# Patient Record
Sex: FEMALE | Race: WHITE | NOT HISPANIC OR LATINO | Employment: OTHER | URBAN - METROPOLITAN AREA
[De-identification: names, ages, dates, MRNs, and addresses within clinical notes are randomized per-mention and may not be internally consistent; named-entity substitution may affect disease eponyms.]

---

## 2017-03-23 ENCOUNTER — HOSPITAL ENCOUNTER (OUTPATIENT)
Dept: RADIOLOGY | Facility: MEDICAL CENTER | Age: 67
End: 2017-03-23
Attending: INTERNAL MEDICINE
Payer: COMMERCIAL

## 2017-03-23 DIAGNOSIS — Z00.00 ROUTINE GENERAL MEDICAL EXAMINATION AT A HEALTH CARE FACILITY: ICD-10-CM

## 2017-03-23 PROCEDURE — 4410556 CT-CARDIAC SCORING

## 2019-03-06 ENCOUNTER — APPOINTMENT (RX ONLY)
Dept: URBAN - METROPOLITAN AREA CLINIC 4 | Facility: CLINIC | Age: 69
Setting detail: DERMATOLOGY
End: 2019-03-06

## 2019-03-06 DIAGNOSIS — L82.1 OTHER SEBORRHEIC KERATOSIS: ICD-10-CM

## 2019-03-06 DIAGNOSIS — Z71.89 OTHER SPECIFIED COUNSELING: ICD-10-CM

## 2019-03-06 DIAGNOSIS — L57.8 OTHER SKIN CHANGES DUE TO CHRONIC EXPOSURE TO NONIONIZING RADIATION: ICD-10-CM

## 2019-03-06 DIAGNOSIS — D18.0 HEMANGIOMA: ICD-10-CM

## 2019-03-06 DIAGNOSIS — D22 MELANOCYTIC NEVI: ICD-10-CM

## 2019-03-06 DIAGNOSIS — L81.4 OTHER MELANIN HYPERPIGMENTATION: ICD-10-CM

## 2019-03-06 PROBLEM — D22.5 MELANOCYTIC NEVI OF TRUNK: Status: ACTIVE | Noted: 2019-03-06

## 2019-03-06 PROBLEM — D18.01 HEMANGIOMA OF SKIN AND SUBCUTANEOUS TISSUE: Status: ACTIVE | Noted: 2019-03-06

## 2019-03-06 PROBLEM — I10 ESSENTIAL (PRIMARY) HYPERTENSION: Status: ACTIVE | Noted: 2019-03-06

## 2019-03-06 PROBLEM — L57.0 ACTINIC KERATOSIS: Status: ACTIVE | Noted: 2019-03-06

## 2019-03-06 PROCEDURE — 99203 OFFICE O/P NEW LOW 30 MIN: CPT

## 2019-03-06 PROCEDURE — ? ADDITIONAL NOTES

## 2019-03-06 PROCEDURE — ? COUNSELING

## 2019-03-06 ASSESSMENT — LOCATION DETAILED DESCRIPTION DERM
LOCATION DETAILED: UPPER STERNUM
LOCATION DETAILED: LEFT PROXIMAL DORSAL FOREARM
LOCATION DETAILED: RIGHT INFERIOR CENTRAL MALAR CHEEK
LOCATION DETAILED: LEFT DISTAL DORSAL FOREARM
LOCATION DETAILED: LEFT CENTRAL MALAR CHEEK
LOCATION DETAILED: RIGHT SUPERIOR MEDIAL MIDBACK
LOCATION DETAILED: RIGHT DISTAL DORSAL FOREARM
LOCATION DETAILED: RIGHT PROXIMAL DORSAL FOREARM
LOCATION DETAILED: LEFT INFERIOR CENTRAL MALAR CHEEK
LOCATION DETAILED: LEFT INFERIOR MEDIAL MIDBACK
LOCATION DETAILED: STERNAL NOTCH

## 2019-03-06 ASSESSMENT — LOCATION SIMPLE DESCRIPTION DERM
LOCATION SIMPLE: CHEST
LOCATION SIMPLE: LEFT CHEEK
LOCATION SIMPLE: RIGHT LOWER BACK
LOCATION SIMPLE: RIGHT FOREARM
LOCATION SIMPLE: RIGHT CHEEK
LOCATION SIMPLE: LEFT FOREARM
LOCATION SIMPLE: LEFT LOWER BACK

## 2019-03-06 ASSESSMENT — LOCATION ZONE DERM
LOCATION ZONE: FACE
LOCATION ZONE: TRUNK
LOCATION ZONE: ARM

## 2019-03-06 NOTE — PROCEDURE: ADDITIONAL NOTES
Detail Level: Simple
Additional Notes: Includes spots of concern on intake.\\nReassure and observe for any changes.

## 2020-07-13 ENCOUNTER — APPOINTMENT (RX ONLY)
Dept: URBAN - METROPOLITAN AREA CLINIC 4 | Facility: CLINIC | Age: 70
Setting detail: DERMATOLOGY
End: 2020-07-13

## 2020-07-13 DIAGNOSIS — L57.0 ACTINIC KERATOSIS: ICD-10-CM

## 2020-07-13 DIAGNOSIS — Z71.89 OTHER SPECIFIED COUNSELING: ICD-10-CM

## 2020-07-13 DIAGNOSIS — L81.4 OTHER MELANIN HYPERPIGMENTATION: ICD-10-CM

## 2020-07-13 DIAGNOSIS — D18.0 HEMANGIOMA: ICD-10-CM

## 2020-07-13 DIAGNOSIS — L82.1 OTHER SEBORRHEIC KERATOSIS: ICD-10-CM

## 2020-07-13 DIAGNOSIS — D22 MELANOCYTIC NEVI: ICD-10-CM

## 2020-07-13 DIAGNOSIS — L57.8 OTHER SKIN CHANGES DUE TO CHRONIC EXPOSURE TO NONIONIZING RADIATION: ICD-10-CM

## 2020-07-13 PROBLEM — D18.01 HEMANGIOMA OF SKIN AND SUBCUTANEOUS TISSUE: Status: ACTIVE | Noted: 2020-07-13

## 2020-07-13 PROBLEM — D22.5 MELANOCYTIC NEVI OF TRUNK: Status: ACTIVE | Noted: 2020-07-13

## 2020-07-13 PROCEDURE — 17000 DESTRUCT PREMALG LESION: CPT

## 2020-07-13 PROCEDURE — ? COUNSELING

## 2020-07-13 PROCEDURE — ? LIQUID NITROGEN

## 2020-07-13 PROCEDURE — 99214 OFFICE O/P EST MOD 30 MIN: CPT | Mod: 25

## 2020-07-13 ASSESSMENT — LOCATION SIMPLE DESCRIPTION DERM
LOCATION SIMPLE: LEFT FOREARM
LOCATION SIMPLE: RIGHT FOREARM
LOCATION SIMPLE: UPPER BACK
LOCATION SIMPLE: CHEST
LOCATION SIMPLE: NOSE
LOCATION SIMPLE: LEFT LOWER BACK
LOCATION SIMPLE: RIGHT LOWER BACK
LOCATION SIMPLE: RIGHT CHEEK
LOCATION SIMPLE: LEFT CHEEK

## 2020-07-13 ASSESSMENT — LOCATION DETAILED DESCRIPTION DERM
LOCATION DETAILED: STERNAL NOTCH
LOCATION DETAILED: UPPER STERNUM
LOCATION DETAILED: LEFT INFERIOR CENTRAL MALAR CHEEK
LOCATION DETAILED: RIGHT DISTAL DORSAL FOREARM
LOCATION DETAILED: LEFT DISTAL DORSAL FOREARM
LOCATION DETAILED: LEFT PROXIMAL DORSAL FOREARM
LOCATION DETAILED: RIGHT INFERIOR CENTRAL MALAR CHEEK
LOCATION DETAILED: RIGHT SUPERIOR MEDIAL MIDBACK
LOCATION DETAILED: LEFT INFERIOR MEDIAL MIDBACK
LOCATION DETAILED: RIGHT PROXIMAL DORSAL FOREARM
LOCATION DETAILED: INFERIOR THORACIC SPINE
LOCATION DETAILED: NASAL ROOT

## 2020-07-13 ASSESSMENT — LOCATION ZONE DERM
LOCATION ZONE: NOSE
LOCATION ZONE: TRUNK
LOCATION ZONE: ARM
LOCATION ZONE: FACE

## 2020-07-13 NOTE — PROCEDURE: LIQUID NITROGEN
Duration Of Freeze Thaw-Cycle (Seconds): 0
Render Note In Bullet Format When Appropriate: No
Consent: The patient's consent was obtained including but not limited to risks of crusting, scabbing, blistering, scarring, darker or lighter pigmentary change, recurrence, incomplete removal and infection.
Post-Care Instructions: I reviewed with the patient in detail post-care instructions. Patient is to wear sunprotection, and avoid picking at any of the treated lesions. Pt may apply Vaseline  or Aquaphor to crusted or scabbing areas.
Detail Level: Detailed

## 2021-07-15 ENCOUNTER — APPOINTMENT (RX ONLY)
Dept: URBAN - METROPOLITAN AREA CLINIC 31 | Facility: CLINIC | Age: 71
Setting detail: DERMATOLOGY
End: 2021-07-15

## 2021-07-15 DIAGNOSIS — L57.0 ACTINIC KERATOSIS: ICD-10-CM

## 2021-07-15 DIAGNOSIS — D485 NEOPLASM OF UNCERTAIN BEHAVIOR OF SKIN: ICD-10-CM

## 2021-07-15 DIAGNOSIS — Z71.89 OTHER SPECIFIED COUNSELING: ICD-10-CM

## 2021-07-15 DIAGNOSIS — L81.4 OTHER MELANIN HYPERPIGMENTATION: ICD-10-CM

## 2021-07-15 DIAGNOSIS — D69.2 OTHER NONTHROMBOCYTOPENIC PURPURA: ICD-10-CM

## 2021-07-15 DIAGNOSIS — D22 MELANOCYTIC NEVI: ICD-10-CM

## 2021-07-15 DIAGNOSIS — L82.1 OTHER SEBORRHEIC KERATOSIS: ICD-10-CM

## 2021-07-15 DIAGNOSIS — D18.0 HEMANGIOMA: ICD-10-CM

## 2021-07-15 PROBLEM — D22.62 MELANOCYTIC NEVI OF LEFT UPPER LIMB, INCLUDING SHOULDER: Status: ACTIVE | Noted: 2021-07-15

## 2021-07-15 PROBLEM — D22.5 MELANOCYTIC NEVI OF TRUNK: Status: ACTIVE | Noted: 2021-07-15

## 2021-07-15 PROBLEM — D22.72 MELANOCYTIC NEVI OF LEFT LOWER LIMB, INCLUDING HIP: Status: ACTIVE | Noted: 2021-07-15

## 2021-07-15 PROBLEM — D18.01 HEMANGIOMA OF SKIN AND SUBCUTANEOUS TISSUE: Status: ACTIVE | Noted: 2021-07-15

## 2021-07-15 PROBLEM — D48.5 NEOPLASM OF UNCERTAIN BEHAVIOR OF SKIN: Status: ACTIVE | Noted: 2021-07-15

## 2021-07-15 PROBLEM — D22.71 MELANOCYTIC NEVI OF RIGHT LOWER LIMB, INCLUDING HIP: Status: ACTIVE | Noted: 2021-07-15

## 2021-07-15 PROBLEM — D22.61 MELANOCYTIC NEVI OF RIGHT UPPER LIMB, INCLUDING SHOULDER: Status: ACTIVE | Noted: 2021-07-15

## 2021-07-15 PROCEDURE — 11102 TANGNTL BX SKIN SINGLE LES: CPT

## 2021-07-15 PROCEDURE — ? COUNSELING

## 2021-07-15 PROCEDURE — 17000 DESTRUCT PREMALG LESION: CPT | Mod: 59

## 2021-07-15 PROCEDURE — ? LIQUID NITROGEN

## 2021-07-15 PROCEDURE — 99213 OFFICE O/P EST LOW 20 MIN: CPT | Mod: 25

## 2021-07-15 PROCEDURE — 17003 DESTRUCT PREMALG LES 2-14: CPT

## 2021-07-15 PROCEDURE — ? BIOPSY BY SHAVE METHOD

## 2021-07-15 ASSESSMENT — LOCATION DETAILED DESCRIPTION DERM
LOCATION DETAILED: LEFT CENTRAL MALAR CHEEK
LOCATION DETAILED: RIGHT PROXIMAL DORSAL FOREARM
LOCATION DETAILED: LEFT MEDIAL MALAR CHEEK
LOCATION DETAILED: RIGHT POSTERIOR SHOULDER
LOCATION DETAILED: LEFT RADIAL DORSAL HAND
LOCATION DETAILED: RIGHT CENTRAL MALAR CHEEK
LOCATION DETAILED: RIGHT INFERIOR MEDIAL UPPER BACK
LOCATION DETAILED: RIGHT POPLITEAL SKIN
LOCATION DETAILED: LEFT POSTERIOR SHOULDER
LOCATION DETAILED: EPIGASTRIC SKIN
LOCATION DETAILED: RIGHT MEDIAL UPPER BACK
LOCATION DETAILED: NASAL DORSUM
LOCATION DETAILED: LEFT ANTERIOR DISTAL THIGH
LOCATION DETAILED: RIGHT ANTERIOR DISTAL THIGH
LOCATION DETAILED: LEFT ELBOW
LOCATION DETAILED: RIGHT VENTRAL DISTAL FOREARM
LOCATION DETAILED: RIGHT KNEE
LOCATION DETAILED: LEFT DISTAL DORSAL FOREARM
LOCATION DETAILED: LEFT POPLITEAL SKIN
LOCATION DETAILED: PERIUMBILICAL SKIN
LOCATION DETAILED: RIGHT DISTAL DORSAL FOREARM
LOCATION DETAILED: MIDDLE STERNUM
LOCATION DETAILED: LEFT VENTRAL DISTAL FOREARM
LOCATION DETAILED: LEFT ANTECUBITAL SKIN
LOCATION DETAILED: LEFT PROXIMAL DORSAL FOREARM
LOCATION DETAILED: RIGHT ELBOW
LOCATION DETAILED: RIGHT ANTECUBITAL SKIN
LOCATION DETAILED: RIGHT DISTAL POSTERIOR THIGH
LOCATION DETAILED: RIGHT RADIAL DORSAL HAND
LOCATION DETAILED: LEFT DISTAL POSTERIOR THIGH
LOCATION DETAILED: LEFT KNEE
LOCATION DETAILED: LEFT INFERIOR MEDIAL UPPER BACK
LOCATION DETAILED: NASAL ROOT

## 2021-07-15 ASSESSMENT — LOCATION ZONE DERM
LOCATION ZONE: ARM
LOCATION ZONE: FACE
LOCATION ZONE: TRUNK
LOCATION ZONE: NOSE
LOCATION ZONE: HAND
LOCATION ZONE: LEG

## 2021-07-15 ASSESSMENT — LOCATION SIMPLE DESCRIPTION DERM
LOCATION SIMPLE: LEFT SHOULDER
LOCATION SIMPLE: RIGHT THIGH
LOCATION SIMPLE: RIGHT ELBOW
LOCATION SIMPLE: LEFT UPPER ARM
LOCATION SIMPLE: RIGHT UPPER ARM
LOCATION SIMPLE: RIGHT UPPER BACK
LOCATION SIMPLE: RIGHT CHEEK
LOCATION SIMPLE: LEFT THIGH
LOCATION SIMPLE: CHEST
LOCATION SIMPLE: LEFT POPLITEAL SKIN
LOCATION SIMPLE: RIGHT POSTERIOR THIGH
LOCATION SIMPLE: LEFT FOREARM
LOCATION SIMPLE: RIGHT HAND
LOCATION SIMPLE: NOSE
LOCATION SIMPLE: LEFT ELBOW
LOCATION SIMPLE: ABDOMEN
LOCATION SIMPLE: LEFT HAND
LOCATION SIMPLE: LEFT CHEEK
LOCATION SIMPLE: LEFT KNEE
LOCATION SIMPLE: LEFT UPPER BACK
LOCATION SIMPLE: LEFT POSTERIOR THIGH
LOCATION SIMPLE: RIGHT POPLITEAL SKIN
LOCATION SIMPLE: RIGHT FOREARM
LOCATION SIMPLE: RIGHT SHOULDER
LOCATION SIMPLE: RIGHT KNEE

## 2021-07-15 NOTE — PROCEDURE: REASSURANCE
Detail Level: Zone
Include Location In Plan?: No
Additional Note: Includes lesion of concern noted on intake.
Additional Notes (Optional): Recommend emollient moisturizer bid
Detail Level: Simple

## 2021-07-15 NOTE — PROCEDURE: LIQUID NITROGEN
Detail Level: Detailed
Render Note In Bullet Format When Appropriate: No
Post-Care Instructions: I reviewed with the patient in detail post-care instructions. Patient is to wear sunprotection, and avoid picking at any of the treated lesions. Pt may apply Vaseline to crusted or scabbing areas.
Duration Of Freeze Thaw-Cycle (Seconds): 0
Show Aperture Variable?: Yes
Consent: The patient's consent was obtained including but not limited to risks of crusting, scabbing, blistering, scarring, darker or lighter pigmentary change, recurrence, incomplete removal and infection.

## 2021-08-09 ENCOUNTER — APPOINTMENT (RX ONLY)
Dept: URBAN - METROPOLITAN AREA CLINIC 31 | Facility: CLINIC | Age: 71
Setting detail: DERMATOLOGY
End: 2021-08-09

## 2021-08-09 VITALS
OXYGEN SATURATION: 99 % | SYSTOLIC BLOOD PRESSURE: 132 MMHG | DIASTOLIC BLOOD PRESSURE: 78 MMHG | HEART RATE: 71 BPM | TEMPERATURE: 97.7 F

## 2021-08-09 PROBLEM — C44.311 BASAL CELL CARCINOMA OF SKIN OF NOSE: Status: ACTIVE | Noted: 2021-08-09

## 2021-08-09 PROCEDURE — ? MOHS SURGERY

## 2021-08-09 PROCEDURE — 17311 MOHS 1 STAGE H/N/HF/G: CPT

## 2021-08-09 PROCEDURE — 14060 TIS TRNFR E/N/E/L 10 SQ CM/<: CPT

## 2021-08-09 NOTE — PROCEDURE: MIPS QUALITY
Quality 431: Preventive Care And Screening: Unhealthy Alcohol Use - Screening: Patient screened for unhealthy alcohol use using a single question and scores less than 2 times per year
Quality 226: Preventive Care And Screening: Tobacco Use: Screening And Cessation Intervention: Patient screened for tobacco use and is an ex/non-smoker
Quality 130: Documentation Of Current Medications In The Medical Record: Current Medications Documented
Quality 265: Biopsy Follow-Up: Biopsy results reviewed, communicated, tracked, and documented
Detail Level: Detailed
Quality 111:Pneumonia Vaccination Status For Older Adults: Pneumococcal Vaccination Previously Received

## 2021-08-09 NOTE — PROCEDURE: MOHS SURGERY
Did You Provide Opioid Counseling: No
Purse String (Intermediate) Text: Given the location of the defect and the characteristics of the surrounding skin a purse string intermediate closure was deemed most appropriate.  Undermining was performed circumfirentially around the surgical defect.  A purse string suture was then placed and tightened.
Consent (Marginal Mandibular)/Introductory Paragraph: The rationale for Mohs was explained to the patient and consent was obtained. The risks, benefits and alternatives to therapy were discussed in detail. Specifically, the risks of damage to the marginal mandibular branch of the facial nerve, infection, scarring, bleeding, prolonged wound healing, incomplete removal, allergy to anesthesia, and recurrence were addressed. Prior to the procedure, the treatment site was clearly identified and confirmed by the patient. All components of Universal Protocol/PAUSE Rule completed.
Otolaryngologist Procedure Text (C): After obtaining clear surgical margins the patient was sent to otolaryngology for surgical repair.  The patient understands they will receive post-surgical care and follow-up from the referring physician's office.
Incorporate Mauc Into Note After Indications: Yes
Incidental Squamous Cell Carcinoma In Situ Histology Text: In addition to the primary tumor, there were glassy pink keratinocytes in the epidermis with full thickness atypia and atypical cell morphology.
Hemigard Intro: Due to skin fragility and wound tension, it was decided to use HEMIGARD adhesive retention suture devices to permit a linear closure. The skin was cleaned and dried for a 6cm distance away from the wound. Excessive hair, if present, was removed to allow for adhesion.
Oculoplastic Surgeon Procedure Text (B): After obtaining clear surgical margins the patient was sent to oculoplastics for surgical repair.  The patient understands they will receive post-surgical care and follow-up from the referring physician's office.
Double M-Plasty Complex Repair Preamble Text (Leave Blank If You Do Not Want): Extensive wide undermining was performed.
Previous Accession (Optional): [id number]
Bcc  Nodular Histology Text: There were numerous nodular aggregates of basaloid cells in the dermis with atypical cell morphology.
Detail Level: Detailed
Quadrants Reporting?: 0
Island Pedicle Flap Text: The defect edges were debeveled with a #15 scalpel blade.  Given the location of the defect, shape of the defect and the proximity to free margins an island pedicle advancement flap was deemed most appropriate.  Using a sterile surgical marker, an appropriate advancement flap was drawn incorporating the defect, outlining the appropriate donor tissue and placing the expected incisions within the relaxed skin tension lines where possible.    The area thus outlined was incised deep to adipose tissue with a #15 scalpel blade.  The skin margins were undermined to an appropriate distance in all directions around the primary defect and laterally outward around the island pedicle utilizing iris scissors.  There was minimal undermining beneath the pedicle flap.
Tumor Debulked?: curette
Epidermal Closure: running cuticular
Stage 15: Additional Anesthesia Type: 1% lidocaine with epinephrine
Estimated Blood Loss (Cc): minimal
Consent (Spinal Accessory)/Introductory Paragraph: The rationale for Mohs was explained to the patient and consent was obtained. The risks, benefits and alternatives to therapy were discussed in detail. Specifically, the risks of damage to the spinal accessory nerve, infection, scarring, bleeding, prolonged wound healing, incomplete removal, allergy to anesthesia, and recurrence were addressed. Prior to the procedure, the treatment site was clearly identified and confirmed by the patient. All components of Universal Protocol/PAUSE Rule completed.
Special Stains Stage 14 - Results: Base On Clearance Noted Above
Double O-Z Plasty Text: The defect edges were debeveled with a #15 scalpel blade.  Given the location of the defect, shape of the defect and the proximity to free margins a Double O-Z plasty (double transposition flap) was deemed most appropriate.  Using a sterile surgical marker, the appropriate transposition flaps were drawn incorporating the defect and placing the expected incisions within the relaxed skin tension lines where possible. The area thus outlined was incised deep to adipose tissue with a #15 scalpel blade.  The skin margins were undermined to an appropriate distance in all directions utilizing iris scissors.  Hemostasis was achieved with electrocautery.  The flaps were then transposed into place, one clockwise and the other counterclockwise, and anchored with interrupted buried subcutaneous sutures.
X Size Of Lesion In Cm (Optional): 0.5
Inflammation Suggestive Of Cancer Camouflage Histology Text: There was a dense lymphocytic infiltrate which prevented adequate histologic evaluation of adjacent structures.
Nostril Rim Text: The closure involved the nostril rim.
Consent 2/Introductory Paragraph: Mohs surgery was explained to the patient and consent was obtained. The risks, benefits and alternatives to therapy were discussed in detail. Specifically, the risks of infection, scarring, bleeding, prolonged wound healing, incomplete removal, allergy to anesthesia, nerve injury and recurrence were addressed. Prior to the procedure, the treatment site was clearly identified and confirmed by the patient. All components of Universal Protocol/PAUSE Rule completed.
Helical Rim Text: The closure involved the helical rim.
Initial Size Of Lesion: 0.4
Brow Lift Text: A midfrontal incision was made medially to the defect to allow access to the tissues just superior to the left eyebrow. Following careful dissection inferiorly in a supraperiosteal plane to the level of the left eyebrow, several 3-0 monocryl sutures were used to resuspend the eyebrow orbicularis oculi muscular unit to the superior frontal bone periosteum. This resulted in an appropriate reapproximation of static eyebrow symmetry and correction of the left brow ptosis.
Provider Procedure Text (B): After obtaining clear surgical margins the defect was repaired by another provider.
Ear Star Wedge Flap Text: The defect edges were debeveled with a #15 blade scalpel.  Given the location of the defect and the proximity to free margins (helical rim) an ear star wedge flap was deemed most appropriate.  Using a sterile surgical marker, the appropriate flap was drawn incorporating the defect and placing the expected incisions between the helical rim and antihelix where possible.  The area thus outlined was incised through and through with a #15 scalpel blade.
O-L Flap Text: The defect edges were debeveled with a #15 scalpel blade.  Given the location of the defect, shape of the defect and the proximity to free margins an O-L flap was deemed most appropriate.  Using a sterile surgical marker, an appropriate advancement flap was drawn incorporating the defect and placing the expected incisions within the relaxed skin tension lines where possible.    The area thus outlined was incised deep to adipose tissue with a #15 scalpel blade.  The skin margins were undermined to an appropriate distance in all directions utilizing iris scissors.
Asc Procedure Text (F): After obtaining clear surgical margins the patient was sent to an ASC for surgical repair.  The patient understands they will receive post-surgical care and follow-up from the ASC physician.
Missing Epidermis Histology Text: There was focal missing epidermis on the sections examined.
Partial Purse String (Simple) Text: Given the location of the defect and the characteristics of the surrounding skin a simple purse string closure was deemed most appropriate.  Undermining was performed circumfirentially around the surgical defect.  A purse string suture was then placed and tightened. Wound tension only allowed a partial closure of the circular defect.
Wound Care: Vaseline
Plastic Surgeon Procedure Text (C): After obtaining clear surgical margins the patient was sent to plastics for surgical repair.  The patient understands they will receive post-surgical care and follow-up from the referring physician's office.
Area L Indication Text: Tumors in this location are included in Area L (trunk and extremities).  Mohs surgery is indicated for larger tumors, or tumors with aggressive histologic features, in these anatomic locations.
Area H Indication Text: Tumors in this location are included in Area H (eyelids, eyebrows, nose, lips, chin, ear, pre-auricular, post-auricular, temple, genitalia, hands, feet, ankles and areola).  Tissue conservation is critical in these anatomic locations.
Consent 1/Introductory Paragraph: The rationale for Mohs was explained to the patient and consent was obtained. The risks, benefits and alternatives to therapy were discussed in detail. Specifically, the risks of infection, scarring, bleeding, prolonged wound healing, incomplete removal, allergy to anesthesia, nerve injury and recurrence were addressed. Prior to the procedure, the treatment site was clearly identified and confirmed by the patient. All components of Universal Protocol/PAUSE Rule completed.
Helical Rim Advancement Flap Text: The defect edges were debeveled with a #15 blade scalpel.  Given the location of the defect and the proximity to free margins (helical rim) a double helical rim advancement flap was deemed most appropriate.  Using a sterile surgical marker, the appropriate advancement flaps were drawn incorporating the defect and placing the expected incisions between the helical rim and antihelix where possible.  The area thus outlined was incised through and through with a #15 scalpel blade.  With a skin hook and iris scissors, the flaps were gently and sharply undermined and freed up.
Pain Refusal Text: I offered to prescribe pain medication but the patient refused to take this medication.
Graft Donor Site Bandage (Optional-Leave Blank If You Don't Want In Note): A bolster was fitted to the graft site and sewn into place. A pressure bandage were applied to the donor site and over the bolster.
Graft Cartilage Fenestration Text: The cartilage was fenestrated with a 2mm punch biopsy to help facilitate graft survival and healing.
Burow's Advancement Flap Text: The defect edges were debeveled with a #15 scalpel blade.  Given the location of the defect and the proximity to free margins a Burow's advancement flap was deemed most appropriate.  Using a sterile surgical marker, the appropriate advancement flap was drawn incorporating the defect and placing the expected incisions within the relaxed skin tension lines where possible.    The area thus outlined was incised deep to adipose tissue with a #15 scalpel blade.  The skin margins were undermined to an appropriate distance in all directions utilizing iris scissors.
Posterior Auricular Interpolation Flap Text: A decision was made to reconstruct the defect utilizing an interpolation axial flap and a staged reconstruction.  A telfa template was made of the defect.  This telfa template was then used to outline the posterior auricular interpolation flap.  The donor area for the pedicle flap was then injected with anesthesia.  The flap was excised through the skin and subcutaneous tissue down to the layer of the underlying musculature.  The pedicle flap was carefully excised within this deep plane to maintain its blood supply.  The edges of the donor site were undermined.   The donor site was closed in a primary fashion.  The pedicle was then rotated into position and sutured.  Once the tube was sutured into place, adequate blood supply was confirmed with blanching and refill.  The pedicle was then wrapped with xeroform gauze and dressed appropriately with a telfa and gauze bandage to ensure continued blood supply and protect the attached pedicle.
Mohs Histo Method Verbiage: Each section was then chromacoded and processed in the Mohs lab using the Mohs protocol and submitted for frozen section.
Ear Wedge Repair Text: A wedge excision was completed by carrying down an excision through the full thickness of the ear and cartilage with an inward facing Burow's triangle. The wound was then closed in a layered fashion.
Crescentic Advancement Flap Text: The defect edges were debeveled with a #15 scalpel blade.  Given the location of the defect and the proximity to free margins a crescentic advancement flap was deemed most appropriate.  Using a sterile surgical marker, the appropriate advancement flap was drawn incorporating the defect and placing the expected incisions within the relaxed skin tension lines where possible.    The area thus outlined was incised deep to adipose tissue with a #15 scalpel blade.  The skin margins were undermined to an appropriate distance in all directions utilizing iris scissors.
Intermediate Repair Preamble Text (Leave Blank If You Do Not Want): Undermining was performed with blunt dissection.
Undermining Type: Entire Wound
Mid-Level Procedure Text (D): After obtaining clear surgical margins the patient was sent to a mid-level provider for surgical repair.  The patient understands they will receive post-surgical care and follow-up from the mid-level provider.
Debridement Text: The wound edges were debrided prior to proceeding with the closure to facilitate wound healing.
Mohs Case Number: [id number]
Melolabial Transposition Flap Text: The defect edges were debeveled with a #15 scalpel blade.  Given the location of the defect and the proximity to free margins a melolabial flap was deemed most appropriate.  Using a sterile surgical marker, an appropriate melolabial transposition flap was drawn incorporating the defect.    The area thus outlined was incised deep to adipose tissue with a #15 scalpel blade.  The skin margins were undermined to an appropriate distance in all directions utilizing iris scissors.
Referring Physician (Optional): MARY Jenkins
V-Y Flap Text: The defect edges were debeveled with a #15 scalpel blade.  Given the location of the defect, shape of the defect and the proximity to free margins a V-Y flap was deemed most appropriate.  Using a sterile surgical marker, an appropriate advancement flap was drawn incorporating the defect and placing the expected incisions within the relaxed skin tension lines where possible.    The area thus outlined was incised deep to adipose tissue with a #15 scalpel blade.  The skin margins were undermined to an appropriate distance in all directions utilizing iris scissors.
Advancement Flap (Double) Text: The defect edges were debeveled with a #15 scalpel blade.  Given the location of the defect and the proximity to free margins a double advancement flap was deemed most appropriate.  Using a sterile surgical marker, the appropriate advancement flaps were drawn incorporating the defect and placing the expected incisions within the relaxed skin tension lines where possible.    The area thus outlined was incised deep to adipose tissue with a #15 scalpel blade.  The skin margins were undermined to an appropriate distance in all directions utilizing iris scissors.
Consent 3/Introductory Paragraph: I gave the patient a chance to ask questions they had about the procedure.  Following this I explained the Mohs procedure and consent was obtained. The risks, benefits and alternatives to therapy were discussed in detail. Specifically, the risks of infection, scarring, bleeding, prolonged wound healing, incomplete removal, allergy to anesthesia, nerve injury and recurrence were addressed. Prior to the procedure, the treatment site was clearly identified and confirmed by the patient. All components of Universal Protocol/PAUSE Rule completed.
Epidermal Closure Graft Donor Site (Optional): running
O-T Plasty Text: The defect edges were debeveled with a #15 scalpel blade.  Given the location of the defect, shape of the defect and the proximity to free margins an O-T plasty was deemed most appropriate.  Using a sterile surgical marker, an appropriate O-T plasty was drawn incorporating the defect and placing the expected incisions within the relaxed skin tension lines where possible.    The area thus outlined was incised deep to adipose tissue with a #15 scalpel blade.  The skin margins were undermined to an appropriate distance in all directions utilizing iris scissors.
Retention Suture Text: Retention sutures were placed to support the closure and prevent dehiscence.
Cartilage Graft Text: The defect edges were debeveled with a #15 scalpel blade.  Given the location of the defect, shape of the defect, the fact the defect involved a full thickness cartilage defect a cartilage graft was deemed most appropriate.  An appropriate donor site was identified, cleansed, and anesthetized. The cartilage graft was then harvested and transferred to the recipient site, oriented appropriately and then sutured into place.  The secondary defect was then repaired using a primary closure.
W Plasty Text: The lesion was extirpated to the level of the fat with a #15 scalpel blade.  Given the location of the defect, shape of the defect and the proximity to free margins a W-plasty was deemed most appropriate for repair.  Using a sterile surgical marker, the appropriate transposition arms of the W-plasty were drawn incorporating the defect and placing the expected incisions within the relaxed skin tension lines where possible.    The area thus outlined was incised deep to adipose tissue with a #15 scalpel blade.  The skin margins were undermined to an appropriate distance in all directions utilizing iris scissors.  The opposing transposition arms were then transposed into place in opposite direction and anchored with interrupted buried subcutaneous sutures.
Complex Repair And Graft Additional Text (Will Appearing After The Standard Complex Repair Text): The complex repair was not sufficient to completely close the primary defect. The remaining additional defect was repaired with the graft mentioned below.
Scc Moderately Differentiated Histology Text: There are nests and single cells of moderately-differentiated atypical squamous epithelial cells extending into the dermis with atypical cell morphology.
Anesthesia Volume In Cc: 9
Tissue Cultured Epidermal Autograft Text: The defect edges were debeveled with a #15 scalpel blade.  Given the location of the defect, shape of the defect and the proximity to free margins a tissue cultured epidermal autograft was deemed most appropriate.  The graft was then trimmed to fit the size of the defect.  The graft was then placed in the primary defect and oriented appropriately.
Cheiloplasty (Less Than 50%) Text: A decision was made to reconstruct the defect with a  cheiloplasty.  The defect was undermined extensively.  Additional obicularis oris muscle was excised with a 15 blade scalpel.  The defect was converted into a full thickness wedge, of less than 50% of the vertical height of the lip, to facilite a better cosmetic result.  Small vessels were then tied off with 5-0 monocyrl. The obicularis oris, superficial fascia, adipose and dermis were then reapproximated.  After the deeper layers were approximated the epidermis was reapproximated with particular care given to realign the vermilion border.
Peng Advancement Flap Text: The defect edges were debeveled with a #15 scalpel blade.  Given the location of the defect, shape of the defect and the proximity to free margins a Peng advancement flap was deemed most appropriate.  Using a sterile surgical marker, an appropriate advancement flap was drawn incorporating the defect and placing the expected incisions within the relaxed skin tension lines where possible. The area thus outlined was incised deep to adipose tissue with a #15 scalpel blade.  The skin margins were undermined to an appropriate distance in all directions utilizing iris scissors.
Mart-1 - Positive Histology Text: MART-1 staining demonstrates areas of higher density and clustering of melanocytes with Pagetoid spread upwards within the epidermis. The surgical margins are positive for tumor cells.
H Plasty Text: Given the location of the defect, shape of the defect and the proximity to free margins a H-plasty was deemed most appropriate for repair.  Using a sterile surgical marker, the appropriate advancement arms of the H-plasty were drawn incorporating the defect and placing the expected incisions within the relaxed skin tension lines where possible. The area thus outlined was incised deep to adipose tissue with a #15 scalpel blade. The skin margins were undermined to an appropriate distance in all directions utilizing iris scissors.  The opposing advancement arms were then advanced into place in opposite direction and anchored with interrupted buried subcutaneous sutures.
Suturegard Body: The suture ends were repeatedly re-tightened and re-clamped to achieve the desired tissue expansion.
Rhomboid Transposition Flap Text: The defect edges were debeveled with a #15 scalpel blade.  Given the location of the defect and the proximity to free margins a rhomboid transposition flap was deemed most appropriate.  Using a sterile surgical marker, an appropriate rhomboid flap was drawn incorporating the defect.    The area thus outlined was incised deep to adipose tissue with a #15 scalpel blade.  The skin margins were undermined to an appropriate distance in all directions utilizing iris scissors.
Mixed Superficial And Nodular Bcc Histology Text: There were numerous nodular aggregates of basaloid cells in the dermis as well as budding off the epidermis with atypical cell morphology.
Donor Site Anesthesia Type: same as repair anesthesia
Melolabial Interpolation Flap Text: A decision was made to reconstruct the defect utilizing an interpolation axial flap and a staged reconstruction.  A telfa template was made of the defect.  This telfa template was then used to outline the melolabial interpolation flap.  The donor area for the pedicle flap was then injected with anesthesia.  The flap was excised through the skin and subcutaneous tissue down to the layer of the underlying musculature.  The pedicle flap was carefully excised within this deep plane to maintain its blood supply.  The edges of the donor site were undermined.   The donor site was closed in a primary fashion.  The pedicle was then rotated into position and sutured.  Once the tube was sutured into place, adequate blood supply was confirmed with blanching and refill.  The pedicle was then wrapped with xeroform gauze and dressed appropriately with a telfa and gauze bandage to ensure continued blood supply and protect the attached pedicle.
Scc Poorly Differentiated Histology Text: There are nests and single cells of poorly-differentiated atypical squamous epithelial cells extending into the dermis with atypical cell morphology.
Epidermal Sutures: 6-0 Surgipro
O-Z Flap Text: The defect edges were debeveled with a #15 scalpel blade.  Given the location of the defect, shape of the defect and the proximity to free margins an O-Z flap was deemed most appropriate.  Using a sterile surgical marker, an appropriate transposition flap was drawn incorporating the defect and placing the expected incisions within the relaxed skin tension lines where possible. The area thus outlined was incised deep to adipose tissue with a #15 scalpel blade.  The skin margins were undermined to an appropriate distance in all directions utilizing iris scissors.
Consent (Ear)/Introductory Paragraph: The rationale for Mohs was explained to the patient and consent was obtained. The risks, benefits and alternatives to therapy were discussed in detail. Specifically, the risks of ear deformity, infection, scarring, bleeding, prolonged wound healing, incomplete removal, allergy to anesthesia, nerve injury and recurrence were addressed. Prior to the procedure, the treatment site was clearly identified and confirmed by the patient. All components of Universal Protocol/PAUSE Rule completed.
Modified Advancement Flap Text: The defect edges were debeveled with a #15 scalpel blade.  Given the location of the defect, shape of the defect and the proximity to free margins a modified advancement flap was deemed most appropriate.  Using a sterile surgical marker, an appropriate advancement flap was drawn incorporating the defect and placing the expected incisions within the relaxed skin tension lines where possible.    The area thus outlined was incised deep to adipose tissue with a #15 scalpel blade.  The skin margins were undermined to an appropriate distance in all directions utilizing iris scissors.
Split-Thickness Skin Graft Text: The defect edges were debeveled with a #15 scalpel blade.  Given the location of the defect, shape of the defect and the proximity to free margins a split thickness skin graft was deemed most appropriate.  Using a sterile surgical marker, the primary defect shape was transferred to the donor site. The split thickness graft was then harvested.  The skin graft was then placed in the primary defect and oriented appropriately.
Orbicularis Oris Muscle Flap Text: The defect edges were debeveled with a #15 scalpel blade.  Given that the defect affected the competency of the oral sphincter an obicularis oris muscle flap was deemed most appropriate to restore this competency and normal muscle function.  Using a sterile surgical marker, an appropriate flap was drawn incorporating the defect. The area thus outlined was incised with a #15 scalpel blade.
Ftsg Text: The defect edges were debeveled with a #15 scalpel blade.  Given the location of the defect, shape of the defect and the proximity to free margins a full thickness skin graft was deemed most appropriate.  Using a sterile surgical marker, the primary defect shape was transferred to the donor site. The area thus outlined was incised deep to adipose tissue with a #15 scalpel blade.  The harvested graft was then trimmed of adipose tissue until only dermis and epidermis was left.  The skin margins of the secondary defect were undermined to an appropriate distance in all directions utilizing iris scissors.  The secondary defect was closed with interrupted buried subcutaneous sutures.  The skin edges were then re-apposed with running  sutures.  The skin graft was then placed in the primary defect and oriented appropriately.
Trilobed Flap Text: The defect edges were debeveled with a #15 scalpel blade.  Given the location of the defect and the proximity to free margins a trilobed flap was deemed most appropriate.  Using a sterile surgical marker, an appropriate trilobed flap drawn around the defect.    The area thus outlined was incised deep to adipose tissue with a #15 scalpel blade.  The skin margins were undermined to an appropriate distance in all directions utilizing iris scissors.
Non-Graft Cartilage Fenestration Text: The cartilage was fenestrated with a 2mm punch biopsy to help facilitate healing.
Was The Patient On Physician Recommended Anticoagulation Therapy?: Please Select the Appropriate Response
Unna Boot Text: An Unna boot was placed to help immobilize the limb and facilitate more rapid healing.
Additional Anesthesia Volume In Cc: 6
Manual Repair Warning Statement: We plan on removing the manually selected variable below in favor of our much easier automatic structured text blocks found in the previous tab. We decided to do this to help make the flow better and give you the full power of structured data. Manual selection is never going to be ideal in our platform and I would encourage you to avoid using manual selection from this point on, especially since I will be sunsetting this feature. It is important that you do one of two things with the customized text below. First, you can save all of the text in a word file so you can have it for future reference. Second, transfer the text to the appropriate area in the Library tab. Lastly, if there is a flap or graft type which we do not have you need to let us know right away so I can add it in before the variable is hidden. No need to panic, we plan to give you roughly 6 months to make the change.
Bilateral Helical Rim Advancement Flap Text: The defect edges were debeveled with a #15 blade scalpel.  Given the location of the defect and the proximity to free margins (helical rim) a bilateral helical rim advancement flap was deemed most appropriate.  Using a sterile surgical marker, the appropriate advancement flaps were drawn incorporating the defect and placing the expected incisions between the helical rim and antihelix where possible.  The area thus outlined was incised through and through with a #15 scalpel blade.  With a skin hook and iris scissors, the flaps were gently and sharply undermined and freed up.
Mercedes Flap Text: The defect edges were debeveled with a #15 scalpel blade.  Given the location of the defect, shape of the defect and the proximity to free margins a Mercedes flap was deemed most appropriate.  Using a sterile surgical marker, an appropriate advancement flap was drawn incorporating the defect and placing the expected incisions within the relaxed skin tension lines where possible. The area thus outlined was incised deep to adipose tissue with a #15 scalpel blade.  The skin margins were undermined to an appropriate distance in all directions utilizing iris scissors.
Vermilion Border Text: The closure involved the vermilion border.
Composite Graft Text: The defect edges were debeveled with a #15 scalpel blade.  Given the location of the defect, shape of the defect, the proximity to free margins and the fact the defect was full thickness a composite graft was deemed most appropriate.  The defect was outline and then transferred to the donor site.  A full thickness graft was then excised from the donor site. The graft was then placed in the primary defect, oriented appropriately and then sutured into place.  The secondary defect was then repaired using a primary closure.
Repair Type: Flap
Secondary Defect Width In Cm (Required For Flaps): 1
Post-Care Instructions: I reviewed with the patient in detail post-care instructions. Patient is not to engage in any heavy lifting, exercise, or swimming for the next 14 days. Should the patient develop any fevers, chills, bleeding, severe pain patient will contact the office immediately.
Interpolation Flap Text: A decision was made to reconstruct the defect utilizing an interpolation axial flap and a staged reconstruction.  A telfa template was made of the defect.  This telfa template was then used to outline the interpolation flap.  The donor area for the pedicle flap was then injected with anesthesia.  The flap was excised through the skin and subcutaneous tissue down to the layer of the underlying musculature.  The interpolation flap was carefully excised within this deep plane to maintain its blood supply.  The edges of the donor site were undermined.   The donor site was closed in a primary fashion.  The pedicle was then rotated into position and sutured.  Once the tube was sutured into place, adequate blood supply was confirmed with blanching and refill.  The pedicle was then wrapped with xeroform gauze and dressed appropriately with a telfa and gauze bandage to ensure continued blood supply and protect the attached pedicle.
Closure 3 Information: This tab is for additional flaps and grafts above and beyond our usual structured repairs.  Please note if you enter information here it will not currently bill and you will need to add the billing information manually.
Mohs Method Verbiage: An incision at a 45 degree angle following the standard Mohs approach was done and the specimen was harvested as a microscopic controlled layer.
Bcc Micronodular Histology Text: There were numerous micronodular aggregates of basaloid cells in the dermis with atypical cell morphology.
Surgical Defect Width In Cm (Optional): 1.1
Island Pedicle Flap-Requiring Vessel Identification Text: The defect edges were debeveled with a #15 scalpel blade.  Given the location of the defect, shape of the defect and the proximity to free margins an island pedicle advancement flap was deemed most appropriate.  Using a sterile surgical marker, an appropriate advancement flap was drawn, based on the axial vessel mentioned above, incorporating the defect, outlining the appropriate donor tissue and placing the expected incisions within the relaxed skin tension lines where possible.    The area thus outlined was incised deep to adipose tissue with a #15 scalpel blade.  The skin margins were undermined to an appropriate distance in all directions around the primary defect and laterally outward around the island pedicle utilizing iris scissors.  There was minimal undermining beneath the pedicle flap.
Mixed Nodular And Infiltrative Bcc Histology Text: There were numerous nodular and infiltrative aggregates of basaloid cells in the dermis with atypical cell morphology.
Banner Transposition Flap Text: The defect edges were debeveled with a #15 scalpel blade.  Given the location of the defect and the proximity to free margins a Banner transposition flap was deemed most appropriate.  Using a sterile surgical marker, an appropriate flap drawn around the defect. The area thus outlined was incised deep to adipose tissue with a #15 scalpel blade.  The skin margins were undermined to an appropriate distance in all directions utilizing iris scissors.
Repair Hemostasis (Optional): Pinpoint electrocautery
Cheek Interpolation Flap Text: A decision was made to reconstruct the defect utilizing an interpolation axial flap and a staged reconstruction.  A telfa template was made of the defect.  This telfa template was then used to outline the Cheek Interpolation flap.  The donor area for the pedicle flap was then injected with anesthesia.  The flap was excised through the skin and subcutaneous tissue down to the layer of the underlying musculature.  The interpolation flap was carefully excised within this deep plane to maintain its blood supply.  The edges of the donor site were undermined.   The donor site was closed in a primary fashion.  The pedicle was then rotated into position and sutured.  Once the tube was sutured into place, adequate blood supply was confirmed with blanching and refill.  The pedicle was then wrapped with xeroform gauze and dressed appropriately with a telfa and gauze bandage to ensure continued blood supply and protect the attached pedicle.
Bcc Infiltrative Histology Text: There were numerous aggregates of basaloid cells demonstrating an infiltrative pattern in the dermis with atypical cell morphology
S Plasty Text: Given the location and shape of the defect, and the orientation of relaxed skin tension lines, an S-plasty was deemed most appropriate for repair.  Using a sterile surgical marker, the appropriate outline of the S-plasty was drawn, incorporating the defect and placing the expected incisions within the relaxed skin tension lines where possible.  The area thus outlined was incised deep to adipose tissue with a #15 scalpel blade.  The skin margins were undermined to an appropriate distance in all directions utilizing iris scissors. The skin flaps were advanced over the defect.  The opposing margins were then approximated with interrupted buried subcutaneous sutures.
Localized Dermabrasion With Wire Brush Text: The patient was draped in routine manner.  Localized dermabrasion using 3 x 17 mm wire brush was performed in routine manner to papillary dermis. This spot dermabrasion is being performed to complete skin cancer reconstruction. It also will eliminate the other sun damaged precancerous cells that are known to be part of the regional effect of a lifetime's worth of sun exposure. This localized dermabrasion is therapeutic and should not be considered cosmetic in any regard.
Cheek-To-Nose Interpolation Flap Text: A decision was made to reconstruct the defect utilizing an interpolation axial flap and a staged reconstruction.  A telfa template was made of the defect.  This telfa template was then used to outline the Cheek-To-Nose Interpolation flap.  The donor area for the pedicle flap was then injected with anesthesia.  The flap was excised through the skin and subcutaneous tissue down to the layer of the underlying musculature.  The interpolation flap was carefully excised within this deep plane to maintain its blood supply.  The edges of the donor site were undermined.   The donor site was closed in a primary fashion.  The pedicle was then rotated into position and sutured.  Once the tube was sutured into place, adequate blood supply was confirmed with blanching and refill.  The pedicle was then wrapped with xeroform gauze and dressed appropriately with a telfa and gauze bandage to ensure continued blood supply and protect the attached pedicle.
Nasal Turnover Hinge Flap Text: The defect edges were debeveled with a #15 scalpel blade.  Given the size, depth, location of the defect and the defect being full thickness a nasal turnover hinge flap was deemed most appropriate.  Using a sterile surgical marker, an appropriate hinge flap was drawn incorporating the defect. The area thus outlined was incised with a #15 scalpel blade. The flap was designed to recreate the nasal mucosal lining and the alar rim. The skin margins were undermined to an appropriate distance in all directions utilizing iris scissors.
Hemigard Retention Suture: 0-0 Nylon
Tumor Depth: Less than 6mm from granular layer and no invasion beyond the subcutaneous fat
Subsequent Stages Histo Method Verbiage: Using a similar technique to that described above, a thin layer of tissue was removed from all areas where tumor was visible on the previous stage.  The tissue was again oriented, mapped, dyed, and processed as above.
Incidental Superficial Basal Cell Carcinoma Histology Text: In addition to the primary tumor, there were aggregates of basaloid cells budding off of the epidermis with atypical cell morphology.
Secondary Defect Length In Cm (Required For Flaps): 2
No Residual Tumor Seen Histology Text: There were no malignant cells seen in the sections examined.
Area M Indication Text: Tumors in this location are included in Area M (cheek, forehead, scalp, neck, jawline and pretibial skin).  Mohs surgery is indicated for tumors in these anatomic locations.
Surgeon/Pathologist Verbiage (Will Incorporate Name Of Surgeon From Intro If Not Blank): operated in two distinct and integrated capacities as the surgeon and pathologist.
Consent (Lip)/Introductory Paragraph: The rationale for Mohs was explained to the patient and consent was obtained. The risks, benefits and alternatives to therapy were discussed in detail. Specifically, the risks of lip deformity, changes in the oral aperture, infection, scarring, bleeding, prolonged wound healing, incomplete removal, allergy to anesthesia, nerve injury and recurrence were addressed. Prior to the procedure, the treatment site was clearly identified and confirmed by the patient. All components of Universal Protocol/PAUSE Rule completed.
Mauc Instructions: By selecting yes to the question below the MAUC number will be added into the note.  This will be calculated automatically based on the diagnosis chosen, the size entered, the body zone selected (H,M,L) and the specific indications you chose. You will also have the option to override the Mohs AUC if you disagree with the automatically calculated number and this option is found in the Case Summary tab.
Epidermal Autograft Text: The defect edges were debeveled with a #15 scalpel blade.  Given the location of the defect, shape of the defect and the proximity to free margins an epidermal autograft was deemed most appropriate.  Using a sterile surgical marker, the primary defect shape was transferred to the donor site. The epidermal graft was then harvested.  The skin graft was then placed in the primary defect and oriented appropriately.
Consent (Near Eyelid Margin)/Introductory Paragraph: The rationale for Mohs was explained to the patient and consent was obtained. The risks, benefits and alternatives to therapy were discussed in detail. Specifically, the risks of ectropion or eyelid deformity, infection, scarring, bleeding, prolonged wound healing, incomplete removal, allergy to anesthesia, nerve injury and recurrence were addressed. Prior to the procedure, the treatment site was clearly identified and confirmed by the patient. All components of Universal Protocol/PAUSE Rule completed.
Partial Purse String (Intermediate) Text: Given the location of the defect and the characteristics of the surrounding skin an intermediate purse string closure was deemed most appropriate.  Undermining was performed circumfirentially around the surgical defect.  A purse string suture was then placed and tightened. Wound tension only allowed a partial closure of the circular defect.
Skin Substitute Text: The defect edges were debeveled with a #15 scalpel blade.  Given the location of the defect, shape of the defect and the proximity to free margins a skin substitute graft was deemed most appropriate.  The graft material was trimmed to fit the size of the defect. The graft was then placed in the primary defect and oriented appropriately.
Retention Suture Bite Size: 1 mm
Same Histology In Subsequent Stages Text: The pattern and morphology of the tumor is as described in the first stage.
Full Thickness Lip Wedge Repair (Flap) Text: Given the location of the defect and the proximity to free margins a full thickness wedge repair was deemed most appropriate.  Using a sterile surgical marker, the appropriate repair was drawn incorporating the defect and placing the expected incisions perpendicular to the vermilion border.  The vermilion border was also meticulously outlined to ensure appropriate reapproximation during the repair.  The area thus outlined was incised through and through with a #15 scalpel blade.  The muscularis and dermis were reaproximated with deep sutures following hemostasis. Care was taken to realign the vermilion border before proceeding with the superficial closure.  Once the vermilion was realigned the superfical and mucosal closure was finished.
Alar Island Pedicle Flap Text: The defect edges were debeveled with a #15 scalpel blade.  Given the location of the defect, shape of the defect and the proximity to the alar rim an island pedicle advancement flap was deemed most appropriate.  Using a sterile surgical marker, an appropriate advancement flap was drawn incorporating the defect, outlining the appropriate donor tissue and placing the expected incisions within the nasal ala running parallel to the alar rim. The area thus outlined was incised with a #15 scalpel blade.  The skin margins were undermined minimally to an appropriate distance in all directions around the primary defect and laterally outward around the island pedicle utilizing iris scissors.  There was minimal undermining beneath the pedicle flap.
Dorsal Nasal Flap Text: The defect edges were debeveled with a #15 scalpel blade.  Given the location of the defect and the proximity to free margins a dorsal nasal flap was deemed most appropriate.  Using a sterile surgical marker, an appropriate dorsal nasal flap was drawn around the defect.    The area thus outlined was incised deep to adipose tissue with a #15 scalpel blade.  The skin margins were undermined to an appropriate distance in all directions utilizing iris scissors.
Information: Selecting Yes will display possible errors in your note based on the variables you have selected. This validation is only offered as a suggestion for you. PLEASE NOTE THAT THE VALIDATION TEXT WILL BE REMOVED WHEN YOU FINALIZE YOUR NOTE. IF YOU WANT TO FAX A PRELIMINARY NOTE YOU WILL NEED TO TOGGLE THIS TO 'NO' IF YOU DO NOT WANT IT IN YOUR FAXED NOTE.
Bcc Keratotic Histology Text: There were numerous aggregates of keratinizing basaloid cells in the dermis with atypical cell morphology.
Primary Defect Length In Cm (Final Defect Size - Required For Flaps/Grafts): 1.2
Mohs Rapid Report Verbiage: The area of clinically evident tumor was marked with skin marking ink and appropriately hatched.  The initial incision was made following the Mohs approach through the skin.  The specimen was taken to the lab, divided into the necessary number of pieces, chromacoded and processed according to the Mohs protocol.  This was repeated in successive stages until a tumor free defect was achieved.
Advancement Flap (Single) Text: The defect edges were debeveled with a #15 scalpel blade.  Given the location of the defect and the proximity to free margins a single advancement flap was deemed most appropriate.  Using a sterile surgical marker, an appropriate advancement flap was drawn incorporating the defect and placing the expected incisions within the relaxed skin tension lines where possible.    The area thus outlined was incised deep to adipose tissue with a #15 scalpel blade.  The skin margins were undermined to an appropriate distance in all directions utilizing iris scissors.
Wound Care (No Sutures): Petrolatum
Undermining Location (Optional): in the superficial subcutaneous fat
Mucosal Advancement Flap Text: Given the location of the defect, shape of the defect and the proximity to free margins a mucosal advancement flap was deemed most appropriate. Incisions were made with a 15 blade scalpel in the appropriate fashion along the cutaneous vermilion border and the mucosal lip. The remaining actinically damaged mucosal tissue was excised.  The mucosal advancement flap was then elevated to the gingival sulcus with care taken to preserve the neurovascular structures and advanced into the primary defect. Care was taken to ensure that precise realignment of the vermilion border was achieved.
Secondary Intention Text (Leave Blank If You Do Not Want): The defect will heal with secondary intention.
Spiral Flap Text: The defect edges were debeveled with a #15 scalpel blade.  Given the location of the defect, shape of the defect and the proximity to free margins a spiral flap was deemed most appropriate.  Using a sterile surgical marker, an appropriate rotation flap was drawn incorporating the defect and placing the expected incisions within the relaxed skin tension lines where possible. The area thus outlined was incised deep to adipose tissue with a #15 scalpel blade.  The skin margins were undermined to an appropriate distance in all directions utilizing iris scissors.
Rotation Flap Text: The defect edges were debeveled with a #15 scalpel blade.  Given the location of the defect, shape of the defect and the proximity to free margins a rotation flap was deemed most appropriate.  Using a sterile surgical marker, an appropriate rotation flap was drawn incorporating the defect and placing the expected incisions within the relaxed skin tension lines where possible.    The area thus outlined was incised deep to adipose tissue with a #15 scalpel blade.  The skin margins were undermined to an appropriate distance in all directions utilizing iris scissors.
Star Wedge Flap Text: The defect edges were debeveled with a #15 scalpel blade.  Given the location of the defect, shape of the defect and the proximity to free margins a star wedge flap was deemed most appropriate.  Using a sterile surgical marker, an appropriate rotation flap was drawn incorporating the defect and placing the expected incisions within the relaxed skin tension lines where possible. The area thus outlined was incised deep to adipose tissue with a #15 scalpel blade.  The skin margins were undermined to an appropriate distance in all directions utilizing iris scissors.
Paramedian Forehead Flap Text: A decision was made to reconstruct the defect utilizing an interpolation axial flap and a staged reconstruction.  A telfa template was made of the defect.  This telfa template was then used to outline the paramedian forehead pedicle flap.  The donor area for the pedicle flap was then injected with anesthesia.  The flap was excised through the skin and subcutaneous tissue down to the layer of the underlying musculature.  The pedicle flap was carefully excised within this deep plane to maintain its blood supply.  The edges of the donor site were undermined.   The donor site was closed in a primary fashion.  The pedicle was then rotated into position and sutured.  Once the tube was sutured into place, adequate blood supply was confirmed with blanching and refill.  The pedicle was then wrapped with xeroform gauze and dressed appropriately with a telfa and gauze bandage to ensure continued blood supply and protect the attached pedicle.
Fibroepithelioma Of Pinkus Histology Text: There were numerous interconnected and branching strands of  basaloid cells extending from the epidermis into the dermis with atypical cell morphology.
Bi-Rhombic Flap Text: The defect edges were debeveled with a #15 scalpel blade.  Given the location of the defect and the proximity to free margins a bi-rhombic flap was deemed most appropriate.  Using a sterile surgical marker, an appropriate rhombic flap was drawn incorporating the defect. The area thus outlined was incised deep to adipose tissue with a #15 scalpel blade.  The skin margins were undermined to an appropriate distance in all directions utilizing iris scissors.
A-T Advancement Flap Text: The defect edges were debeveled with a #15 scalpel blade.  Given the location of the defect, shape of the defect and the proximity to free margins an A-T advancement flap was deemed most appropriate.  Using a sterile surgical marker, an appropriate advancement flap was drawn incorporating the defect and placing the expected incisions within the relaxed skin tension lines where possible.    The area thus outlined was incised deep to adipose tissue with a #15 scalpel blade.  The skin margins were undermined to an appropriate distance in all directions utilizing iris scissors.
Eye Protection Verbiage: Before proceeding with the stage, a plastic scleral shield was inserted. The globe was anesthetized with a few drops of 1% lidocaine with 1:100,000 epinephrine. Then, an appropriate sized scleral shield was chosen and coated with lacrilube ointment. The shield was gently inserted and left in place for the duration of each stage. After the stage was completed, the shield was gently removed.
Deep Sutures: 5-0 Polysorb
Advancement-Rotation Flap Text: The defect edges were debeveled with a #15 scalpel blade.  Given the location of the defect, shape of the defect and the proximity to free margins an advancement-rotation flap was deemed most appropriate.  Using a sterile surgical marker, an appropriate flap was drawn incorporating the defect and placing the expected incisions within the relaxed skin tension lines where possible. The area thus outlined was incised deep to adipose tissue with a #15 scalpel blade.  The skin margins were undermined to an appropriate distance in all directions utilizing iris scissors.
Dermal Autograft Text: The defect edges were debeveled with a #15 scalpel blade.  Given the location of the defect, shape of the defect and the proximity to free margins a dermal autograft was deemed most appropriate.  Using a sterile surgical marker, the primary defect shape was transferred to the donor site. The area thus outlined was incised deep to adipose tissue with a #15 scalpel blade.  The harvested graft was then trimmed of adipose and epidermal tissue until only dermis was left.  The skin graft was then placed in the primary defect and oriented appropriately.
Xenograft Text: The defect edges were debeveled with a #15 scalpel blade.  Given the location of the defect, shape of the defect and the proximity to free margins a xenograft was deemed most appropriate.  The graft was then trimmed to fit the size of the defect.  The graft was then placed in the primary defect and oriented appropriately.
Bcc Histology Text: There were numerous aggregates of basaloid cells in the dermis with atypical cell morphology.
Zygomaticofacial Flap Text: Given the location of the defect, shape of the defect and the proximity to free margins a zygomaticofacial flap was deemed most appropriate for repair.  Using a sterile surgical marker, the appropriate flap was drawn incorporating the defect and placing the expected incisions within the relaxed skin tension lines where possible. The area thus outlined was incised deep to adipose tissue with a #15 scalpel blade with preservation of a vascular pedicle.  The skin margins were undermined to an appropriate distance in all directions utilizing iris scissors.  The flap was then placed into the defect and anchored with interrupted buried subcutaneous sutures.
Date Of Previous Biopsy (Optional): 07/15/21
Rhombic Flap Text: The defect edges were debeveled with a #15 scalpel blade.  Given the location of the defect and the proximity to free margins a rhombic flap was deemed most appropriate.  Using a sterile surgical marker, an appropriate rhombic flap was drawn incorporating the defect.    The area thus outlined was incised deep to adipose tissue with a #15 scalpel blade.  The skin margins were undermined to an appropriate distance in all directions utilizing iris scissors.
Tarsorrhaphy Text: A tarsorrhaphy was performed using Frost sutures.
Staging Info: By selecting yes to the question above you will include information on AJCC 8 tumor staging in your Mohs note. Information on tumor staging will be automatically added for SCCs on the head and neck. AJCC 8 includes tumor size, tumor depth, perineural involvement and bone invasion.
O-Z Plasty Text: The defect edges were debeveled with a #15 scalpel blade.  Given the location of the defect, shape of the defect and the proximity to free margins an O-Z plasty (double transposition flap) was deemed most appropriate.  Using a sterile surgical marker, the appropriate transposition flaps were drawn incorporating the defect and placing the expected incisions within the relaxed skin tension lines where possible.    The area thus outlined was incised deep to adipose tissue with a #15 scalpel blade.  The skin margins were undermined to an appropriate distance in all directions utilizing iris scissors.  Hemostasis was achieved with electrocautery.  The flaps were then transposed into place, one clockwise and the other counterclockwise, and anchored with interrupted buried subcutaneous sutures.
Bcc Superficial Histology Text: There were numerous aggregates of basaloid cells budding off the epidermis with atypical cell morphology.
Suturegard Intro: Intraoperative tissue expansion was performed, utilizing the SUTUREGARD device, in order to reduce wound tension.
Consent Type: Consent 1 (Standard)
Repair Anesthesia Method: local infiltration
Chonodrocutaneous Helical Advancement Flap Text: The defect edges were debeveled with a #15 scalpel blade.  Given the location of the defect and the proximity to free margins a chondrocutaneous helical advancement flap was deemed most appropriate.  Using a sterile surgical marker, the appropriate advancement flap was drawn incorporating the defect and placing the expected incisions within the relaxed skin tension lines where possible.    The area thus outlined was incised deep to adipose tissue with a #15 scalpel blade.  The skin margins were undermined to an appropriate distance in all directions utilizing iris scissors.
Home Suture Removal Text: Patient was provided instructions on removing sutures and will remove their sutures at home.  If they have any questions or difficulties they will call the office.
Hemostasis: Electrocautery
Graft Donor Site Epidermal Sutures (Optional): 5-0 Surgipro
Bilobed Flap Text: The defect edges were debeveled with a #15 scalpel blade.  Given the location of the defect and the proximity to free margins a bilobe flap was deemed most appropriate.  Using a sterile surgical marker, an appropriate bilobe flap drawn around the defect.    The area thus outlined was incised deep to adipose tissue with a #15 scalpel blade.  The skin margins were undermined to an appropriate distance in all directions utilizing iris scissors.
Island Pedicle Flap With Canthal Suspension Text: The defect edges were debeveled with a #15 scalpel blade.  Given the location of the defect, shape of the defect and the proximity to free margins an island pedicle advancement flap was deemed most appropriate.  Using a sterile surgical marker, an appropriate advancement flap was drawn incorporating the defect, outlining the appropriate donor tissue and placing the expected incisions within the relaxed skin tension lines where possible. The area thus outlined was incised deep to adipose tissue with a #15 scalpel blade.  The skin margins were undermined to an appropriate distance in all directions around the primary defect and laterally outward around the island pedicle utilizing iris scissors.  There was minimal undermining beneath the pedicle flap. A suspension suture was placed in the canthal tendon to prevent tension and prevent ectropion.
Muscle Hinge Flap Text: The defect edges were debeveled with a #15 scalpel blade.  Given the size, depth and location of the defect and the proximity to free margins a muscle hinge flap was deemed most appropriate.  Using a sterile surgical marker, an appropriate hinge flap was drawn incorporating the defect. The area thus outlined was incised with a #15 scalpel blade.  The skin margins were undermined to an appropriate distance in all directions utilizing iris scissors.
Suture Removal: 7 days
Complex Repair And Flap Additional Text (Will Appearing After The Standard Complex Repair Text): The complex repair was not sufficient to completely close the primary defect. The remaining additional defect was repaired with the flap mentioned below.
Double O-Z Flap Text: The defect edges were debeveled with a #15 scalpel blade.  Given the location of the defect, shape of the defect and the proximity to free margins a Double O-Z flap was deemed most appropriate.  Using a sterile surgical marker, an appropriate transposition flap was drawn incorporating the defect and placing the expected incisions within the relaxed skin tension lines where possible. The area thus outlined was incised deep to adipose tissue with a #15 scalpel blade.  The skin margins were undermined to an appropriate distance in all directions utilizing iris scissors.
Nasalis-Muscle-Based Myocutaneous Island Pedicle Flap Text: Using a #15 blade, an incision was made around the donor flap to the level of the nasalis muscle. Wide lateral undermining was then performed in both the subcutaneous plane above the nasalis muscle, and in a submuscular plane just above periosteum. This allowed the formation of a free nasalis muscle axial pedicle (based on the angular artery) which was still attached to the actual cutaneous flap, increasing its mobility and vascular viability. Hemostasis was obtained with pinpoint electrocoagulation. The flap was mobilized into position and the pivotal anchor points positioned and stabilized with buried interrupted sutures. Subcutaneous and dermal tissues were closed in a multilayered fashion with sutures. Tissue redundancies were excised, and the epidermal edges were apposed without significant tension and sutured with sutures.
Burow's Graft Text: The defect edges were debeveled with a #15 scalpel blade.  Given the location of the defect, shape of the defect, the proximity to free margins and the presence of a standing cone deformity a Burow's skin graft was deemed most appropriate. The standing cone was removed and this tissue was then trimmed to the shape of the primary defect. The adipose tissue was also removed until only dermis and epidermis were left.  The skin margins of the secondary defect were undermined to an appropriate distance in all directions utilizing iris scissors.  The secondary defect was closed with interrupted buried subcutaneous sutures.  The skin edges were then re-apposed with running  sutures.  The skin graft was then placed in the primary defect and oriented appropriately.
Keystone Flap Text: The defect edges were debeveled with a #15 scalpel blade.  Given the location of the defect, shape of the defect a keystone flap was deemed most appropriate.  Using a sterile surgical marker, an appropriate keystone flap was drawn incorporating the defect, outlining the appropriate donor tissue and placing the expected incisions within the relaxed skin tension lines where possible. The area thus outlined was incised deep to adipose tissue with a #15 scalpel blade.  The skin margins were undermined to an appropriate distance in all directions around the primary defect and laterally outward around the flap utilizing iris scissors.
O-T Advancement Flap Text: The defect edges were debeveled with a #15 scalpel blade.  Given the location of the defect, shape of the defect and the proximity to free margins an O-T advancement flap was deemed most appropriate.  Using a sterile surgical marker, an appropriate advancement flap was drawn incorporating the defect and placing the expected incisions within the relaxed skin tension lines where possible.    The area thus outlined was incised deep to adipose tissue with a #15 scalpel blade.  The skin margins were undermined to an appropriate distance in all directions utilizing iris scissors.
Incidental Actinic Keratosis Histology Text: In addition to the primary tumor, there were atypical keratinocytes in the epidermis with atypical cell morphology that did not extend the full thickness of the epidermis.
Consent (Nose)/Introductory Paragraph: The rationale for Mohs was explained to the patient and consent was obtained. The risks, benefits and alternatives to therapy were discussed in detail. Specifically, the risks of nasal deformity, changes in the flow of air through the nose, infection, scarring, bleeding, prolonged wound healing, incomplete removal, allergy to anesthesia, nerve injury and recurrence were addressed. Prior to the procedure, the treatment site was clearly identified and confirmed by the patient. All components of Universal Protocol/PAUSE Rule completed.
Where Do You Want The Question To Include Opioid Counseling Located?: Case Summary Tab
Scc Histology Text: There are nests of atypical squamous epithelial cells arising from the epidermis and extending into the dermis.
Closure 2 Information: This tab is for additional flaps and grafts, including complex repair and grafts and complex repair and flaps. You can also specify a different location for the additional defect, if the location is the same you do not need to select a new one. We will insert the automated text for the repair you select below just as we do for solitary flaps and grafts. Please note that at this time if you select a location with a different insurance zone you will need to override the ICD10 and CPT if appropriate.
Dressing: pressure dressing with telfa
Double Island Pedicle Flap Text: The defect edges were debeveled with a #15 scalpel blade.  Given the location of the defect, shape of the defect and the proximity to free margins a double island pedicle advancement flap was deemed most appropriate.  Using a sterile surgical marker, an appropriate advancement flap was drawn incorporating the defect, outlining the appropriate donor tissue and placing the expected incisions within the relaxed skin tension lines where possible.    The area thus outlined was incised deep to adipose tissue with a #15 scalpel blade.  The skin margins were undermined to an appropriate distance in all directions around the primary defect and laterally outward around the island pedicle utilizing iris scissors.  There was minimal undermining beneath the pedicle flap.
Postop Diagnosis: same
Mixed Nodular And Micronodular Bcc Histology Text: There were numerous nodular and micronodular aggregates of basaloid cells in the dermis with atypical cell morphology.
Bilobed Transposition Flap Text: The defect edges were debeveled with a #15 scalpel blade.  Given the location of the defect and the proximity to free margins a bilobed transposition flap was deemed most appropriate.  Using a sterile surgical marker, an appropriate bilobe flap drawn around the defect.    The area thus outlined was incised deep to adipose tissue with a #15 scalpel blade.  The skin margins were undermined to an appropriate distance in all directions utilizing iris scissors.
Mart-1 - Negative Histology Text: MART-1 staining demonstrates a normal density and pattern of melanocytes along the dermal-epidermal junction. The surgical margins are negative for tumor cells.
Cheiloplasty (Complex) Text: A decision was made to reconstruct the defect with a  cheiloplasty.  The defect was undermined extensively.  Additional obicularis oris muscle was excised with a 15 blade scalpel.  The defect was converted into a full thickness wedge to facilite a better cosmetic result.  Small vessels were then tied off with 5-0 monocyrl. The obicularis oris, superficial fascia, adipose and dermis were then reapproximated.  After the deeper layers were approximated the epidermis was reapproximated with particular care given to realign the vermilion border.
No Repair - Repaired With Adjacent Surgical Defect Text (Leave Blank If You Do Not Want): After obtaining clear surgical margins the defect was repaired concurrently with another surgical defect which was in close approximation.
V-Y Plasty Text: The defect edges were debeveled with a #15 scalpel blade.  Given the location of the defect, shape of the defect and the proximity to free margins an V-Y advancement flap was deemed most appropriate.  Using a sterile surgical marker, an appropriate advancement flap was drawn incorporating the defect and placing the expected incisions within the relaxed skin tension lines where possible.    The area thus outlined was incised deep to adipose tissue with a #15 scalpel blade.  The skin margins were undermined to an appropriate distance in all directions utilizing iris scissors.
Surgeon: Piedad Landa MD
Scc In Situ Histology Text: There are glassy pink variably sized keratinocytes in the epidermis with full thickness atypia and atypical cell morphology.
Consent (Scalp)/Introductory Paragraph: The rationale for Mohs was explained to the patient and consent was obtained. The risks, benefits and alternatives to therapy were discussed in detail. Specifically, the risks of changes in hair growth pattern secondary to repair, infection, scarring, bleeding, prolonged wound healing, incomplete removal, allergy to anesthesia, nerve injury and recurrence were addressed. Prior to the procedure, the treatment site was clearly identified and confirmed by the patient. All components of Universal Protocol/PAUSE Rule completed.
Consent (Temporal Branch)/Introductory Paragraph: The rationale for Mohs was explained to the patient and consent was obtained. The risks, benefits and alternatives to therapy were discussed in detail. Specifically, the risks of damage to the temporal branch of the facial nerve, infection, scarring, bleeding, prolonged wound healing, incomplete removal, allergy to anesthesia, and recurrence were addressed. Prior to the procedure, the treatment site was clearly identified and confirmed by the patient. All components of Universal Protocol/PAUSE Rule completed.
Hemigard Postcare Instructions: The HEMIGARD strips are to remain completely dry for at least 5-7 days.
Scc Well Differentiated Histology Text: There are nests of well-differentiated atypical squamous epithelial cells arising from the epidermis and extending into the dermis with keratin pearls and atypical cell morphology.
Medical Necessity Statement: Based on my medical judgement, Mohs surgery is the most appropriate treatment for this cancer compared to other treatments.
Transposition Flap Text: The defect edges were debeveled with a #15 scalpel blade.  Given the location of the defect and the proximity to free margins a transposition flap was deemed most appropriate.  Using a sterile surgical marker, an appropriate transposition flap was drawn incorporating the defect.    The area thus outlined was incised deep to adipose tissue with a #15 scalpel blade.  The skin margins were undermined to an appropriate distance in all directions utilizing iris scissors.
Z Plasty Text: The lesion was extirpated to the level of the fat with a #15 scalpel blade.  Given the location of the defect, shape of the defect and the proximity to free margins a Z-plasty was deemed most appropriate for repair.  Using a sterile surgical marker, the appropriate transposition arms of the Z-plasty were drawn incorporating the defect and placing the expected incisions within the relaxed skin tension lines where possible.    The area thus outlined was incised deep to adipose tissue with a #15 scalpel blade.  The skin margins were undermined to an appropriate distance in all directions utilizing iris scissors.  The opposing transposition arms were then transposed into place in opposite direction and anchored with interrupted buried subcutaneous sutures.
Alternatives Discussed Intro (Do Not Add Period): I discussed alternative treatments to Mohs surgery and specifically discussed the risks and benefits of
Mastoid Interpolation Flap Text: A decision was made to reconstruct the defect utilizing an interpolation axial flap and a staged reconstruction.  A telfa template was made of the defect.  This telfa template was then used to outline the mastoid interpolation flap.  The donor area for the pedicle flap was then injected with anesthesia.  The flap was excised through the skin and subcutaneous tissue down to the layer of the underlying musculature.  The pedicle flap was carefully excised within this deep plane to maintain its blood supply.  The edges of the donor site were undermined.   The donor site was closed in a primary fashion.  The pedicle was then rotated into position and sutured.  Once the tube was sutured into place, adequate blood supply was confirmed with blanching and refill.  The pedicle was then wrapped with xeroform gauze and dressed appropriately with a telfa and gauze bandage to ensure continued blood supply and protect the attached pedicle.
Scc In Situ With Follicular Extension Histology Text: There are glassy pink variably sized keratinocytes in the epidermis and extending down the follicles with full thickness atypia and atypical cell morphology.
Flap Type: Crescentic Advancement Flap
Hatchet Flap Text: The defect edges were debeveled with a #15 scalpel blade.  Given the location of the defect, shape of the defect and the proximity to free margins a hatchet flap was deemed most appropriate.  Using a sterile surgical marker, an appropriate hatchet flap was drawn incorporating the defect and placing the expected incisions within the relaxed skin tension lines where possible.    The area thus outlined was incised deep to adipose tissue with a #15 scalpel blade.  The skin margins were undermined to an appropriate distance in all directions utilizing iris scissors.
Purse String (Simple) Text: Given the location of the defect and the characteristics of the surrounding skin a purse string closure was deemed most appropriate.  Undermining was performed circumfirentially around the surgical defect.  A purse string suture was then placed and tightened.
Staged Advancement Flap Text: The defect edges were debeveled with a #15 scalpel blade.  Given the location of the defect, shape of the defect and the proximity to free margins a staged advancement flap was deemed most appropriate.  Using a sterile surgical marker, an appropriate advancement flap was drawn incorporating the defect and placing the expected incisions within the relaxed skin tension lines where possible. The area thus outlined was incised deep to adipose tissue with a #15 scalpel blade.  The skin margins were undermined to an appropriate distance in all directions utilizing iris scissors.

## 2021-08-16 ENCOUNTER — APPOINTMENT (RX ONLY)
Dept: URBAN - METROPOLITAN AREA CLINIC 31 | Facility: CLINIC | Age: 71
Setting detail: DERMATOLOGY
End: 2021-08-16

## 2021-08-16 DIAGNOSIS — Z48.02 ENCOUNTER FOR REMOVAL OF SUTURES: ICD-10-CM

## 2021-08-16 PROCEDURE — ? COUNSELING

## 2021-08-16 PROCEDURE — 99024 POSTOP FOLLOW-UP VISIT: CPT

## 2021-08-16 PROCEDURE — ? SUTURE REMOVAL (GLOBAL PERIOD)

## 2021-08-16 ASSESSMENT — LOCATION DETAILED DESCRIPTION DERM: LOCATION DETAILED: NASAL DORSUM

## 2021-08-16 ASSESSMENT — LOCATION ZONE DERM: LOCATION ZONE: NOSE

## 2021-08-16 ASSESSMENT — LOCATION SIMPLE DESCRIPTION DERM: LOCATION SIMPLE: NOSE

## 2021-08-16 NOTE — PROCEDURE: SUTURE REMOVAL (GLOBAL PERIOD)
Detail Level: Detailed
Add 99337 Cpt? (Important Note: In 2017 The Use Of 49468 Is Being Tracked By Cms To Determine Future Global Period Reimbursement For Global Periods): yes

## 2021-08-16 NOTE — PROCEDURE: MIPS QUALITY
Detail Level: Detailed
Quality 130: Documentation Of Current Medications In The Medical Record: Current Medications Documented
Quality 431: Preventive Care And Screening: Unhealthy Alcohol Use - Screening: Patient screened for unhealthy alcohol use using a single question and scores less than 2 times per year
Quality 110: Preventive Care And Screening: Influenza Immunization: Influenza Immunization previously received during influenza season
Quality 226: Preventive Care And Screening: Tobacco Use: Screening And Cessation Intervention: Patient screened for tobacco use and is an ex/non-smoker
Quality 111:Pneumonia Vaccination Status For Older Adults: Pneumococcal Vaccination Previously Received

## 2023-06-03 NOTE — PROCEDURE: BIOPSY BY SHAVE METHOD
verbal instruction/patient instructed
Detail Level: Detailed
Depth Of Biopsy: dermis
Was A Bandage Applied: Yes
Size Of Lesion In Cm: 0.3
X Size Of Lesion In Cm: 0
Biopsy Type: H and E
Biopsy Method: Dermablade
Anesthesia Type: 1% lidocaine with epinephrine
Anesthesia Volume In Cc: 0.5
Hemostasis: Drysol and Electrocautery
Wound Care: Aquaphor
Dressing: bandage
Destruction After The Procedure: No
Type Of Destruction Used: Curettage
Curettage Text: The wound bed was treated with curettage after the biopsy was performed.
Cryotherapy Text: The wound bed was treated with cryotherapy after the biopsy was performed.
Electrodesiccation Text: The wound bed was treated with electrodesiccation after the biopsy was performed.
Electrodesiccation And Curettage Text: The wound bed was treated with electrodesiccation and curettage after the biopsy was performed.
Silver Nitrate Text: The wound bed was treated with silver nitrate after the biopsy was performed.
Lab: [id number]
Lab Facility: 
Consent: Written consent was obtained and risks were reviewed including but not limited to scarring, infection, bleeding, scabbing, incomplete removal, nerve damage and allergy to anesthesia.
Post-Care Instructions: I reviewed with the patient in detail post-care instructions. Patient is to keep the biopsy site dry overnight, and then apply bacitracin twice daily until healed. Patient may apply hydrogen peroxide soaks to remove any crusting.
Notification Instructions: Patient will be notified of biopsy results. However, patient instructed to call the office if not contacted within 2 weeks.
Billing Type: Third-Party Bill
Information: Selecting Yes will display possible errors in your note based on the variables you have selected. This validation is only offered as a suggestion for you. PLEASE NOTE THAT THE VALIDATION TEXT WILL BE REMOVED WHEN YOU FINALIZE YOUR NOTE. IF YOU WANT TO FAX A PRELIMINARY NOTE YOU WILL NEED TO TOGGLE THIS TO 'NO' IF YOU DO NOT WANT IT IN YOUR FAXED NOTE.

## 2023-10-17 ENCOUNTER — APPOINTMENT (OUTPATIENT)
Dept: ADMISSIONS | Facility: MEDICAL CENTER | Age: 73
DRG: 460 | End: 2023-10-17
Attending: NEUROLOGICAL SURGERY
Payer: MEDICARE

## 2023-11-20 ENCOUNTER — PRE-ADMISSION TESTING (OUTPATIENT)
Dept: ADMISSIONS | Facility: MEDICAL CENTER | Age: 73
DRG: 460 | End: 2023-11-20
Attending: NEUROLOGICAL SURGERY
Payer: MEDICARE

## 2023-11-20 RX ORDER — LISINOPRIL 10 MG/1
10 TABLET ORAL
Status: ON HOLD | COMMUNITY
End: 2023-12-26

## 2023-11-20 RX ORDER — UBIDECARENONE 100 MG
1 CAPSULE ORAL DAILY
Status: ON HOLD | COMMUNITY
End: 2023-12-16

## 2023-11-20 RX ORDER — CHLORAL HYDRATE 500 MG
1 CAPSULE ORAL DAILY
Status: ON HOLD | COMMUNITY
End: 2023-12-16

## 2023-11-20 RX ORDER — MULTIVIT-MIN/IRON/FOLIC ACID/K 18-600-40
1 CAPSULE ORAL DAILY
Status: ON HOLD | COMMUNITY
End: 2023-12-16

## 2023-11-20 RX ORDER — AMLODIPINE BESYLATE 2.5 MG/1
2.5 TABLET ORAL DAILY
Status: ON HOLD | COMMUNITY
Start: 2023-10-05 | End: 2023-12-26

## 2023-11-20 RX ORDER — ESCITALOPRAM OXALATE 10 MG/1
10 TABLET ORAL DAILY
COMMUNITY
Start: 2023-08-14

## 2023-11-20 RX ORDER — ALENDRONATE SODIUM 70 MG/1
70 TABLET ORAL
COMMUNITY
Start: 2023-08-14

## 2023-11-20 RX ORDER — L.ACID,FERM,PLA,RHA/B.BIF,LONG 126 MG
1 TABLET, DELAYED AND EXTENDED RELEASE ORAL DAILY
COMMUNITY

## 2023-11-28 ENCOUNTER — PRE-ADMISSION TESTING (OUTPATIENT)
Dept: ADMISSIONS | Facility: MEDICAL CENTER | Age: 73
DRG: 460 | End: 2023-11-28
Attending: NEUROLOGICAL SURGERY
Payer: MEDICARE

## 2023-11-28 ENCOUNTER — HOSPITAL ENCOUNTER (OUTPATIENT)
Dept: RADIOLOGY | Facility: MEDICAL CENTER | Age: 73
DRG: 460 | End: 2023-11-28
Attending: NEUROLOGICAL SURGERY | Admitting: NEUROLOGICAL SURGERY
Payer: MEDICARE

## 2023-11-28 DIAGNOSIS — Z01.812 PRE-OPERATIVE LABORATORY EXAMINATION: ICD-10-CM

## 2023-11-28 DIAGNOSIS — Z01.811 PRE-OPERATIVE RESPIRATORY EXAMINATION: ICD-10-CM

## 2023-11-28 DIAGNOSIS — Z01.810 PRE-OPERATIVE CARDIOVASCULAR EXAMINATION: ICD-10-CM

## 2023-11-28 LAB
ABO GROUP BLD: NORMAL
ANION GAP SERPL CALC-SCNC: 10 MMOL/L (ref 7–16)
APPEARANCE UR: CLEAR
APTT PPP: 25 SEC (ref 24.7–36)
BACTERIA #/AREA URNS HPF: NEGATIVE /HPF
BASOPHILS # BLD AUTO: 1 % (ref 0–1.8)
BASOPHILS # BLD: 0.07 K/UL (ref 0–0.12)
BILIRUB UR QL STRIP.AUTO: NEGATIVE
BLD GP AB SCN SERPL QL: NORMAL
BUN SERPL-MCNC: 30 MG/DL (ref 8–22)
CALCIUM SERPL-MCNC: 9.4 MG/DL (ref 8.5–10.5)
CHLORIDE SERPL-SCNC: 105 MMOL/L (ref 96–112)
CO2 SERPL-SCNC: 29 MMOL/L (ref 20–33)
COLOR UR: ABNORMAL
CREAT SERPL-MCNC: 0.7 MG/DL (ref 0.5–1.4)
EOSINOPHIL # BLD AUTO: 0.04 K/UL (ref 0–0.51)
EOSINOPHIL NFR BLD: 0.6 % (ref 0–6.9)
EPI CELLS #/AREA URNS HPF: NEGATIVE /HPF
ERYTHROCYTE [DISTWIDTH] IN BLOOD BY AUTOMATED COUNT: 42.4 FL (ref 35.9–50)
GFR SERPLBLD CREATININE-BSD FMLA CKD-EPI: 90 ML/MIN/1.73 M 2
GLUCOSE SERPL-MCNC: 107 MG/DL (ref 65–99)
GLUCOSE UR STRIP.AUTO-MCNC: NEGATIVE MG/DL
HCT VFR BLD AUTO: 41.1 % (ref 37–47)
HGB BLD-MCNC: 14.5 G/DL (ref 12–16)
HYALINE CASTS #/AREA URNS LPF: ABNORMAL /LPF
IMM GRANULOCYTES # BLD AUTO: 0.02 K/UL (ref 0–0.11)
IMM GRANULOCYTES NFR BLD AUTO: 0.3 % (ref 0–0.9)
INR PPP: 1.05 (ref 0.87–1.13)
KETONES UR STRIP.AUTO-MCNC: ABNORMAL MG/DL
LEUKOCYTE ESTERASE UR QL STRIP.AUTO: NEGATIVE
LYMPHOCYTES # BLD AUTO: 1.57 K/UL (ref 1–4.8)
LYMPHOCYTES NFR BLD: 22.1 % (ref 22–41)
MCH RBC QN AUTO: 33.9 PG (ref 27–33)
MCHC RBC AUTO-ENTMCNC: 35.3 G/DL (ref 32.2–35.5)
MCV RBC AUTO: 96 FL (ref 81.4–97.8)
MICRO URNS: ABNORMAL
MONOCYTES # BLD AUTO: 0.55 K/UL (ref 0–0.85)
MONOCYTES NFR BLD AUTO: 7.7 % (ref 0–13.4)
MUCOUS THREADS #/AREA URNS HPF: ABNORMAL /HPF
NEUTROPHILS # BLD AUTO: 4.85 K/UL (ref 1.82–7.42)
NEUTROPHILS NFR BLD: 68.3 % (ref 44–72)
NITRITE UR QL STRIP.AUTO: NEGATIVE
NRBC # BLD AUTO: 0 K/UL
NRBC BLD-RTO: 0 /100 WBC (ref 0–0.2)
PH UR STRIP.AUTO: 5.5 [PH] (ref 5–8)
PLATELET # BLD AUTO: 323 K/UL (ref 164–446)
PMV BLD AUTO: 9.5 FL (ref 9–12.9)
POTASSIUM SERPL-SCNC: 4.2 MMOL/L (ref 3.6–5.5)
PROT UR QL STRIP: 30 MG/DL
PROTHROMBIN TIME: 13.8 SEC (ref 12–14.6)
RBC # BLD AUTO: 4.28 M/UL (ref 4.2–5.4)
RBC # URNS HPF: ABNORMAL /HPF
RBC UR QL AUTO: NEGATIVE
RH BLD: NORMAL
SODIUM SERPL-SCNC: 144 MMOL/L (ref 135–145)
SP GR UR STRIP.AUTO: 1.03
UROBILINOGEN UR STRIP.AUTO-MCNC: 1 MG/DL
WBC # BLD AUTO: 7.1 K/UL (ref 4.8–10.8)
WBC #/AREA URNS HPF: ABNORMAL /HPF

## 2023-11-28 PROCEDURE — 86900 BLOOD TYPING SEROLOGIC ABO: CPT

## 2023-11-28 PROCEDURE — 81001 URINALYSIS AUTO W/SCOPE: CPT

## 2023-11-28 PROCEDURE — 36415 COLL VENOUS BLD VENIPUNCTURE: CPT

## 2023-11-28 PROCEDURE — 86901 BLOOD TYPING SEROLOGIC RH(D): CPT

## 2023-11-28 PROCEDURE — 85610 PROTHROMBIN TIME: CPT

## 2023-11-28 PROCEDURE — 71046 X-RAY EXAM CHEST 2 VIEWS: CPT

## 2023-11-28 PROCEDURE — 85730 THROMBOPLASTIN TIME PARTIAL: CPT

## 2023-11-28 PROCEDURE — 85025 COMPLETE CBC W/AUTO DIFF WBC: CPT

## 2023-11-28 PROCEDURE — 80048 BASIC METABOLIC PNL TOTAL CA: CPT

## 2023-11-28 PROCEDURE — 93005 ELECTROCARDIOGRAM TRACING: CPT

## 2023-11-28 PROCEDURE — 86850 RBC ANTIBODY SCREEN: CPT

## 2023-11-29 LAB — EKG IMPRESSION: NORMAL

## 2023-11-29 PROCEDURE — 93010 ELECTROCARDIOGRAM REPORT: CPT | Performed by: INTERNAL MEDICINE

## 2023-12-06 ENCOUNTER — HOSPITAL ENCOUNTER (OUTPATIENT)
Dept: RADIOLOGY | Facility: MEDICAL CENTER | Age: 73
End: 2023-12-06
Attending: NEUROLOGICAL SURGERY
Payer: MEDICARE

## 2023-12-06 DIAGNOSIS — M48.062 SPINAL STENOSIS, LUMBAR REGION, WITH NEUROGENIC CLAUDICATION: ICD-10-CM

## 2023-12-06 PROCEDURE — 72131 CT LUMBAR SPINE W/O DYE: CPT

## 2023-12-14 ENCOUNTER — APPOINTMENT (OUTPATIENT)
Dept: RADIOLOGY | Facility: MEDICAL CENTER | Age: 73
DRG: 460 | End: 2023-12-14
Attending: NEUROLOGICAL SURGERY
Payer: MEDICARE

## 2023-12-14 ENCOUNTER — ANESTHESIA EVENT (OUTPATIENT)
Dept: SURGERY | Facility: MEDICAL CENTER | Age: 73
DRG: 460 | End: 2023-12-14
Payer: MEDICARE

## 2023-12-14 ENCOUNTER — HOSPITAL ENCOUNTER (INPATIENT)
Facility: MEDICAL CENTER | Age: 73
LOS: 5 days | DRG: 460 | End: 2023-12-19
Attending: NEUROLOGICAL SURGERY | Admitting: NEUROLOGICAL SURGERY
Payer: MEDICARE

## 2023-12-14 ENCOUNTER — ANESTHESIA (OUTPATIENT)
Dept: SURGERY | Facility: MEDICAL CENTER | Age: 73
DRG: 460 | End: 2023-12-14
Payer: MEDICARE

## 2023-12-14 DIAGNOSIS — M48.061 LUMBAR STENOSIS WITHOUT NEUROGENIC CLAUDICATION: ICD-10-CM

## 2023-12-14 DIAGNOSIS — R11.2 PONV (POSTOPERATIVE NAUSEA AND VOMITING): ICD-10-CM

## 2023-12-14 DIAGNOSIS — Z98.890 PONV (POSTOPERATIVE NAUSEA AND VOMITING): ICD-10-CM

## 2023-12-14 PROBLEM — I10 HTN (HYPERTENSION): Status: ACTIVE | Noted: 2023-12-14

## 2023-12-14 LAB
ABO + RH BLD: NORMAL
HCT VFR BLD AUTO: 37.5 % (ref 37–47)
HGB BLD-MCNC: 13 G/DL (ref 12–16)

## 2023-12-14 PROCEDURE — C1755 CATHETER, INTRASPINAL: HCPCS | Performed by: NEUROLOGICAL SURGERY

## 2023-12-14 PROCEDURE — A9270 NON-COVERED ITEM OR SERVICE: HCPCS | Performed by: NURSE PRACTITIONER

## 2023-12-14 PROCEDURE — 700101 HCHG RX REV CODE 250: Performed by: NURSE PRACTITIONER

## 2023-12-14 PROCEDURE — 72100 X-RAY EXAM L-S SPINE 2/3 VWS: CPT

## 2023-12-14 PROCEDURE — 700111 HCHG RX REV CODE 636 W/ 250 OVERRIDE (IP): Mod: JZ | Performed by: NEUROLOGICAL SURGERY

## 2023-12-14 PROCEDURE — 502714 HCHG ROBOTIC SURGERY SERVICES: Performed by: NEUROLOGICAL SURGERY

## 2023-12-14 PROCEDURE — 160002 HCHG RECOVERY MINUTES (STAT): Performed by: NEUROLOGICAL SURGERY

## 2023-12-14 PROCEDURE — 160048 HCHG OR STATISTICAL LEVEL 1-5: Performed by: NEUROLOGICAL SURGERY

## 2023-12-14 PROCEDURE — 95940 IONM IN OPERATNG ROOM 15 MIN: CPT | Performed by: NEUROLOGICAL SURGERY

## 2023-12-14 PROCEDURE — 160031 HCHG SURGERY MINUTES - 1ST 30 MINS LEVEL 5: Performed by: NEUROLOGICAL SURGERY

## 2023-12-14 PROCEDURE — 160036 HCHG PACU - EA ADDL 30 MINS PHASE I: Performed by: NEUROLOGICAL SURGERY

## 2023-12-14 PROCEDURE — 700105 HCHG RX REV CODE 258: Performed by: NEUROLOGICAL SURGERY

## 2023-12-14 PROCEDURE — 770001 HCHG ROOM/CARE - MED/SURG/GYN PRIV*

## 2023-12-14 PROCEDURE — 0QP004Z REMOVAL OF INTERNAL FIXATION DEVICE FROM LUMBAR VERTEBRA, OPEN APPROACH: ICD-10-PCS | Performed by: NEUROLOGICAL SURGERY

## 2023-12-14 PROCEDURE — 700111 HCHG RX REV CODE 636 W/ 250 OVERRIDE (IP): Performed by: ANESTHESIOLOGY

## 2023-12-14 PROCEDURE — 110371 HCHG SHELL REV 272: Performed by: NEUROLOGICAL SURGERY

## 2023-12-14 PROCEDURE — 85014 HEMATOCRIT: CPT

## 2023-12-14 PROCEDURE — 110454 HCHG SHELL REV 250: Performed by: NEUROLOGICAL SURGERY

## 2023-12-14 PROCEDURE — 700111 HCHG RX REV CODE 636 W/ 250 OVERRIDE (IP): Performed by: NURSE PRACTITIONER

## 2023-12-14 PROCEDURE — 95937 NEUROMUSCULAR JUNCTION TEST: CPT | Performed by: NEUROLOGICAL SURGERY

## 2023-12-14 PROCEDURE — 00QT0ZZ REPAIR SPINAL MENINGES, OPEN APPROACH: ICD-10-PCS | Performed by: NEUROLOGICAL SURGERY

## 2023-12-14 PROCEDURE — 95938 SOMATOSENSORY TESTING: CPT | Performed by: NEUROLOGICAL SURGERY

## 2023-12-14 PROCEDURE — 502000 HCHG MISC OR IMPLANTS RC 0278: Performed by: NEUROLOGICAL SURGERY

## 2023-12-14 PROCEDURE — 85018 HEMOGLOBIN: CPT

## 2023-12-14 PROCEDURE — 700105 HCHG RX REV CODE 258: Performed by: NURSE PRACTITIONER

## 2023-12-14 PROCEDURE — 700102 HCHG RX REV CODE 250 W/ 637 OVERRIDE(OP): Performed by: ANESTHESIOLOGY

## 2023-12-14 PROCEDURE — C1713 ANCHOR/SCREW BN/BN,TIS/BN: HCPCS | Performed by: NEUROLOGICAL SURGERY

## 2023-12-14 PROCEDURE — 700101 HCHG RX REV CODE 250: Performed by: NEUROLOGICAL SURGERY

## 2023-12-14 PROCEDURE — 36415 COLL VENOUS BLD VENIPUNCTURE: CPT

## 2023-12-14 PROCEDURE — 07DR3ZZ EXTRACTION OF ILIAC BONE MARROW, PERCUTANEOUS APPROACH: ICD-10-PCS | Performed by: NEUROLOGICAL SURGERY

## 2023-12-14 PROCEDURE — 95861 NEEDLE EMG 2 EXTREMITIES: CPT | Performed by: NEUROLOGICAL SURGERY

## 2023-12-14 PROCEDURE — 502240 HCHG MISC OR SUPPLY RC 0272: Performed by: NEUROLOGICAL SURGERY

## 2023-12-14 PROCEDURE — 700101 HCHG RX REV CODE 250: Performed by: ANESTHESIOLOGY

## 2023-12-14 PROCEDURE — 160009 HCHG ANES TIME/MIN: Performed by: NEUROLOGICAL SURGERY

## 2023-12-14 PROCEDURE — 8E0W4CZ ROBOTIC ASSISTED PROCEDURE OF TRUNK REGION, PERCUTANEOUS ENDOSCOPIC APPROACH: ICD-10-PCS | Performed by: NEUROLOGICAL SURGERY

## 2023-12-14 PROCEDURE — A9270 NON-COVERED ITEM OR SERVICE: HCPCS | Performed by: ANESTHESIOLOGY

## 2023-12-14 PROCEDURE — 0SG00AJ FUSION OF LUMBAR VERTEBRAL JOINT WITH INTERBODY FUSION DEVICE, POSTERIOR APPROACH, ANTERIOR COLUMN, OPEN APPROACH: ICD-10-PCS | Performed by: NEUROLOGICAL SURGERY

## 2023-12-14 PROCEDURE — 01NB0ZZ RELEASE LUMBAR NERVE, OPEN APPROACH: ICD-10-PCS | Performed by: NEUROLOGICAL SURGERY

## 2023-12-14 PROCEDURE — 700102 HCHG RX REV CODE 250 W/ 637 OVERRIDE(OP): Performed by: NURSE PRACTITIONER

## 2023-12-14 PROCEDURE — 700111 HCHG RX REV CODE 636 W/ 250 OVERRIDE (IP): Mod: JZ | Performed by: ANESTHESIOLOGY

## 2023-12-14 PROCEDURE — 160042 HCHG SURGERY MINUTES - EA ADDL 1 MIN LEVEL 5: Performed by: NEUROLOGICAL SURGERY

## 2023-12-14 PROCEDURE — 160035 HCHG PACU - 1ST 60 MINS PHASE I: Performed by: NEUROLOGICAL SURGERY

## 2023-12-14 DEVICE — GRAFT DURAMATRIX ONLAY PLUS 1IN X 1IN OR 2.7CM X 2.75CM: Type: IMPLANTABLE DEVICE | Site: BACK | Status: FUNCTIONAL

## 2023-12-14 DEVICE — SCREW SOLERA SET SCREW (1TCX40+3TCX21+2TCX10=123): Type: IMPLANTABLE DEVICE | Site: BACK | Status: FUNCTIONAL

## 2023-12-14 DEVICE — SCREW MAS  SOLERA 6.5 X 45MM (1TCX16+3TCX8=40): Type: IMPLANTABLE DEVICE | Site: BACK | Status: FUNCTIONAL

## 2023-12-14 DEVICE — GRAFT BONE CHIPS CANCELLOUS 4-10MM 30CC (1EA): Type: IMPLANTABLE DEVICE | Site: BACK | Status: FUNCTIONAL

## 2023-12-14 DEVICE — DURASEAL SEALANT SYSTEM 5ML - (5/BX): Type: IMPLANTABLE DEVICE | Site: BACK | Status: FUNCTIONAL

## 2023-12-14 DEVICE — IMPLANTABLE DEVICE: Type: IMPLANTABLE DEVICE | Site: BACK | Status: FUNCTIONAL

## 2023-12-14 DEVICE — CAGE SPINAL CATALYFT SPACER PL40 INTERBODY SHORT 24MM X 29MM X 9MM (1EA): Type: IMPLANTABLE DEVICE | Site: BACK | Status: FUNCTIONAL

## 2023-12-14 DEVICE — GRAFT BONE 5CC OSTEOAMP FIBER SELECT: Type: IMPLANTABLE DEVICE | Site: BACK | Status: FUNCTIONAL

## 2023-12-14 RX ORDER — ACETAMINOPHEN 325 MG/1
650 TABLET ORAL EVERY 6 HOURS PRN
Status: DISCONTINUED | OUTPATIENT
Start: 2023-12-19 | End: 2023-12-19 | Stop reason: HOSPADM

## 2023-12-14 RX ORDER — VANCOMYCIN HYDROCHLORIDE 1 G/20ML
INJECTION, POWDER, LYOPHILIZED, FOR SOLUTION INTRAVENOUS
Status: COMPLETED | OUTPATIENT
Start: 2023-12-14 | End: 2023-12-14

## 2023-12-14 RX ORDER — TIZANIDINE 4 MG/1
2 TABLET ORAL 3 TIMES DAILY PRN
Status: DISCONTINUED | OUTPATIENT
Start: 2023-12-14 | End: 2023-12-19 | Stop reason: HOSPADM

## 2023-12-14 RX ORDER — AMLODIPINE BESYLATE 5 MG/1
2.5 TABLET ORAL DAILY
Status: DISCONTINUED | OUTPATIENT
Start: 2023-12-14 | End: 2023-12-19 | Stop reason: HOSPADM

## 2023-12-14 RX ORDER — OXYCODONE HCL 5 MG/5 ML
10 SOLUTION, ORAL ORAL
Status: COMPLETED | OUTPATIENT
Start: 2023-12-14 | End: 2023-12-14

## 2023-12-14 RX ORDER — LABETALOL HYDROCHLORIDE 5 MG/ML
5 INJECTION, SOLUTION INTRAVENOUS
Status: DISCONTINUED | OUTPATIENT
Start: 2023-12-14 | End: 2023-12-14 | Stop reason: HOSPADM

## 2023-12-14 RX ORDER — ESCITALOPRAM OXALATE 10 MG/1
10 TABLET ORAL DAILY
Status: DISCONTINUED | OUTPATIENT
Start: 2023-12-14 | End: 2023-12-19 | Stop reason: HOSPADM

## 2023-12-14 RX ORDER — DOCUSATE SODIUM 100 MG/1
100 CAPSULE, LIQUID FILLED ORAL 2 TIMES DAILY
Status: DISCONTINUED | OUTPATIENT
Start: 2023-12-14 | End: 2023-12-19 | Stop reason: HOSPADM

## 2023-12-14 RX ORDER — ACETAMINOPHEN 325 MG/1
650 TABLET ORAL EVERY 6 HOURS
Status: DISPENSED | OUTPATIENT
Start: 2023-12-14 | End: 2023-12-19

## 2023-12-14 RX ORDER — OXYCODONE HCL 10 MG/1
10 TABLET, FILM COATED, EXTENDED RELEASE ORAL ONCE
Status: COMPLETED | OUTPATIENT
Start: 2023-12-14 | End: 2023-12-14

## 2023-12-14 RX ORDER — CEFAZOLIN SODIUM 1 G/3ML
INJECTION, POWDER, FOR SOLUTION INTRAMUSCULAR; INTRAVENOUS
Status: DISCONTINUED | OUTPATIENT
Start: 2023-12-14 | End: 2023-12-14 | Stop reason: HOSPADM

## 2023-12-14 RX ORDER — SCOLOPAMINE TRANSDERMAL SYSTEM 1 MG/1
1 PATCH, EXTENDED RELEASE TRANSDERMAL
Status: COMPLETED | OUTPATIENT
Start: 2023-12-14 | End: 2023-12-17

## 2023-12-14 RX ORDER — LIDOCAINE HYDROCHLORIDE 20 MG/ML
INJECTION, SOLUTION EPIDURAL; INFILTRATION; INTRACAUDAL; PERINEURAL PRN
Status: DISCONTINUED | OUTPATIENT
Start: 2023-12-14 | End: 2023-12-14 | Stop reason: SURG

## 2023-12-14 RX ORDER — ONDANSETRON 2 MG/ML
4 INJECTION INTRAMUSCULAR; INTRAVENOUS
Status: DISCONTINUED | OUTPATIENT
Start: 2023-12-14 | End: 2023-12-14 | Stop reason: HOSPADM

## 2023-12-14 RX ORDER — BISACODYL 10 MG
10 SUPPOSITORY, RECTAL RECTAL
Status: DISCONTINUED | OUTPATIENT
Start: 2023-12-14 | End: 2023-12-19 | Stop reason: HOSPADM

## 2023-12-14 RX ORDER — ACETAMINOPHEN 500 MG
1000 TABLET ORAL ONCE
Status: COMPLETED | OUTPATIENT
Start: 2023-12-14 | End: 2023-12-14

## 2023-12-14 RX ORDER — CALCIUM CARBONATE 500 MG/1
500 TABLET, CHEWABLE ORAL 2 TIMES DAILY
Status: DISCONTINUED | OUTPATIENT
Start: 2023-12-14 | End: 2023-12-19 | Stop reason: HOSPADM

## 2023-12-14 RX ORDER — METOPROLOL TARTRATE 1 MG/ML
1 INJECTION, SOLUTION INTRAVENOUS
Status: DISCONTINUED | OUTPATIENT
Start: 2023-12-14 | End: 2023-12-14 | Stop reason: HOSPADM

## 2023-12-14 RX ORDER — POLYETHYLENE GLYCOL 3350 17 G/17G
1 POWDER, FOR SOLUTION ORAL 2 TIMES DAILY PRN
Status: DISCONTINUED | OUTPATIENT
Start: 2023-12-14 | End: 2023-12-19 | Stop reason: HOSPADM

## 2023-12-14 RX ORDER — MEPERIDINE HYDROCHLORIDE 25 MG/ML
12.5 INJECTION INTRAMUSCULAR; INTRAVENOUS; SUBCUTANEOUS
Status: DISCONTINUED | OUTPATIENT
Start: 2023-12-14 | End: 2023-12-14 | Stop reason: HOSPADM

## 2023-12-14 RX ORDER — DIPHENHYDRAMINE HCL 25 MG
25 TABLET ORAL EVERY 6 HOURS PRN
Status: DISCONTINUED | OUTPATIENT
Start: 2023-12-14 | End: 2023-12-19 | Stop reason: HOSPADM

## 2023-12-14 RX ORDER — ONDANSETRON 2 MG/ML
INJECTION INTRAMUSCULAR; INTRAVENOUS PRN
Status: DISCONTINUED | OUTPATIENT
Start: 2023-12-14 | End: 2023-12-14 | Stop reason: SURG

## 2023-12-14 RX ORDER — DEXAMETHASONE SODIUM PHOSPHATE 4 MG/ML
INJECTION, SOLUTION INTRA-ARTICULAR; INTRALESIONAL; INTRAMUSCULAR; INTRAVENOUS; SOFT TISSUE PRN
Status: DISCONTINUED | OUTPATIENT
Start: 2023-12-14 | End: 2023-12-14 | Stop reason: SURG

## 2023-12-14 RX ORDER — ATROPINE SULFATE 0.4 MG/ML
INJECTION, SOLUTION INTRAVENOUS PRN
Status: DISCONTINUED | OUTPATIENT
Start: 2023-12-14 | End: 2023-12-14 | Stop reason: SURG

## 2023-12-14 RX ORDER — SODIUM CHLORIDE, SODIUM LACTATE, POTASSIUM CHLORIDE, CALCIUM CHLORIDE 600; 310; 30; 20 MG/100ML; MG/100ML; MG/100ML; MG/100ML
INJECTION, SOLUTION INTRAVENOUS CONTINUOUS
Status: DISCONTINUED | OUTPATIENT
Start: 2023-12-14 | End: 2023-12-14 | Stop reason: HOSPADM

## 2023-12-14 RX ORDER — TRANEXAMIC ACID 100 MG/ML
INJECTION, SOLUTION INTRAVENOUS PRN
Status: DISCONTINUED | OUTPATIENT
Start: 2023-12-14 | End: 2023-12-14 | Stop reason: SURG

## 2023-12-14 RX ORDER — AMOXICILLIN 250 MG
1 CAPSULE ORAL NIGHTLY
Status: DISCONTINUED | OUTPATIENT
Start: 2023-12-14 | End: 2023-12-19 | Stop reason: HOSPADM

## 2023-12-14 RX ORDER — HYDROMORPHONE HYDROCHLORIDE 1 MG/ML
0.4 INJECTION, SOLUTION INTRAMUSCULAR; INTRAVENOUS; SUBCUTANEOUS
Status: DISCONTINUED | OUTPATIENT
Start: 2023-12-14 | End: 2023-12-14 | Stop reason: HOSPADM

## 2023-12-14 RX ORDER — HYDRALAZINE HYDROCHLORIDE 20 MG/ML
5 INJECTION INTRAMUSCULAR; INTRAVENOUS
Status: DISCONTINUED | OUTPATIENT
Start: 2023-12-14 | End: 2023-12-14 | Stop reason: HOSPADM

## 2023-12-14 RX ORDER — GLYCOPYRROLATE 0.2 MG/ML
INJECTION INTRAMUSCULAR; INTRAVENOUS PRN
Status: DISCONTINUED | OUTPATIENT
Start: 2023-12-14 | End: 2023-12-14 | Stop reason: SURG

## 2023-12-14 RX ORDER — HYDROMORPHONE HYDROCHLORIDE 1 MG/ML
0.2 INJECTION, SOLUTION INTRAMUSCULAR; INTRAVENOUS; SUBCUTANEOUS
Status: DISCONTINUED | OUTPATIENT
Start: 2023-12-14 | End: 2023-12-14 | Stop reason: HOSPADM

## 2023-12-14 RX ORDER — DIAZEPAM 5 MG/1
5 TABLET ORAL EVERY 6 HOURS PRN
Status: DISCONTINUED | OUTPATIENT
Start: 2023-12-14 | End: 2023-12-19 | Stop reason: HOSPADM

## 2023-12-14 RX ORDER — HALOPERIDOL 5 MG/ML
1 INJECTION INTRAMUSCULAR
Status: DISCONTINUED | OUTPATIENT
Start: 2023-12-14 | End: 2023-12-14 | Stop reason: HOSPADM

## 2023-12-14 RX ORDER — AMOXICILLIN 250 MG
1 CAPSULE ORAL
Status: DISCONTINUED | OUTPATIENT
Start: 2023-12-14 | End: 2023-12-19 | Stop reason: HOSPADM

## 2023-12-14 RX ORDER — ONDANSETRON 2 MG/ML
4 INJECTION INTRAMUSCULAR; INTRAVENOUS EVERY 4 HOURS PRN
Status: DISCONTINUED | OUTPATIENT
Start: 2023-12-14 | End: 2023-12-19 | Stop reason: HOSPADM

## 2023-12-14 RX ORDER — SODIUM CHLORIDE AND POTASSIUM CHLORIDE 150; 900 MG/100ML; MG/100ML
INJECTION, SOLUTION INTRAVENOUS CONTINUOUS
Status: DISCONTINUED | OUTPATIENT
Start: 2023-12-14 | End: 2023-12-19 | Stop reason: HOSPADM

## 2023-12-14 RX ORDER — OXYCODONE HCL 5 MG/5 ML
5 SOLUTION, ORAL ORAL
Status: COMPLETED | OUTPATIENT
Start: 2023-12-14 | End: 2023-12-14

## 2023-12-14 RX ORDER — LISINOPRIL 10 MG/1
10 TABLET ORAL DAILY
Status: DISCONTINUED | OUTPATIENT
Start: 2023-12-14 | End: 2023-12-19 | Stop reason: HOSPADM

## 2023-12-14 RX ORDER — MIDAZOLAM HYDROCHLORIDE 1 MG/ML
1 INJECTION INTRAMUSCULAR; INTRAVENOUS
Status: DISCONTINUED | OUTPATIENT
Start: 2023-12-14 | End: 2023-12-14 | Stop reason: HOSPADM

## 2023-12-14 RX ORDER — ROCURONIUM BROMIDE 10 MG/ML
INJECTION, SOLUTION INTRAVENOUS PRN
Status: DISCONTINUED | OUTPATIENT
Start: 2023-12-14 | End: 2023-12-14 | Stop reason: SURG

## 2023-12-14 RX ORDER — CEFAZOLIN SODIUM 1 G/3ML
INJECTION, POWDER, FOR SOLUTION INTRAMUSCULAR; INTRAVENOUS PRN
Status: DISCONTINUED | OUTPATIENT
Start: 2023-12-14 | End: 2023-12-14 | Stop reason: SURG

## 2023-12-14 RX ORDER — ONDANSETRON 4 MG/1
4 TABLET, ORALLY DISINTEGRATING ORAL EVERY 4 HOURS PRN
Status: DISCONTINUED | OUTPATIENT
Start: 2023-12-14 | End: 2023-12-19 | Stop reason: HOSPADM

## 2023-12-14 RX ORDER — SODIUM CHLORIDE, SODIUM LACTATE, POTASSIUM CHLORIDE, CALCIUM CHLORIDE 600; 310; 30; 20 MG/100ML; MG/100ML; MG/100ML; MG/100ML
INJECTION, SOLUTION INTRAVENOUS CONTINUOUS
Status: ACTIVE | OUTPATIENT
Start: 2023-12-14 | End: 2023-12-14

## 2023-12-14 RX ORDER — EPHEDRINE SULFATE 50 MG/ML
INJECTION, SOLUTION INTRAVENOUS PRN
Status: DISCONTINUED | OUTPATIENT
Start: 2023-12-14 | End: 2023-12-14 | Stop reason: SURG

## 2023-12-14 RX ORDER — DIPHENHYDRAMINE HYDROCHLORIDE 50 MG/ML
12.5 INJECTION INTRAMUSCULAR; INTRAVENOUS
Status: DISCONTINUED | OUTPATIENT
Start: 2023-12-14 | End: 2023-12-14 | Stop reason: HOSPADM

## 2023-12-14 RX ORDER — EPHEDRINE SULFATE 50 MG/ML
5 INJECTION, SOLUTION INTRAVENOUS
Status: DISCONTINUED | OUTPATIENT
Start: 2023-12-14 | End: 2023-12-14 | Stop reason: HOSPADM

## 2023-12-14 RX ORDER — DIPHENHYDRAMINE HYDROCHLORIDE 50 MG/ML
25 INJECTION INTRAMUSCULAR; INTRAVENOUS EVERY 6 HOURS PRN
Status: DISCONTINUED | OUTPATIENT
Start: 2023-12-14 | End: 2023-12-19 | Stop reason: HOSPADM

## 2023-12-14 RX ORDER — BUPIVACAINE HYDROCHLORIDE AND EPINEPHRINE 2.5; 5 MG/ML; UG/ML
INJECTION, SOLUTION EPIDURAL; INFILTRATION; INTRACAUDAL; PERINEURAL
Status: DISCONTINUED | OUTPATIENT
Start: 2023-12-14 | End: 2023-12-14 | Stop reason: HOSPADM

## 2023-12-14 RX ORDER — ENEMA 19; 7 G/133ML; G/133ML
1 ENEMA RECTAL
Status: DISCONTINUED | OUTPATIENT
Start: 2023-12-14 | End: 2023-12-19 | Stop reason: HOSPADM

## 2023-12-14 RX ORDER — HYDROMORPHONE HYDROCHLORIDE 1 MG/ML
0.1 INJECTION, SOLUTION INTRAMUSCULAR; INTRAVENOUS; SUBCUTANEOUS
Status: DISCONTINUED | OUTPATIENT
Start: 2023-12-14 | End: 2023-12-14 | Stop reason: HOSPADM

## 2023-12-14 RX ADMIN — ESCITALOPRAM OXALATE 10 MG: 10 TABLET ORAL at 14:52

## 2023-12-14 RX ADMIN — FENTANYL CITRATE 100 MCG: 50 INJECTION, SOLUTION INTRAMUSCULAR; INTRAVENOUS at 07:11

## 2023-12-14 RX ADMIN — SODIUM CHLORIDE, POTASSIUM CHLORIDE, SODIUM LACTATE AND CALCIUM CHLORIDE: 600; 310; 30; 20 INJECTION, SOLUTION INTRAVENOUS at 06:08

## 2023-12-14 RX ADMIN — ROCURONIUM BROMIDE 50 MG: 50 INJECTION, SOLUTION INTRAVENOUS at 07:13

## 2023-12-14 RX ADMIN — METOPROLOL TARTRATE 25 MG: 25 TABLET, FILM COATED ORAL at 17:32

## 2023-12-14 RX ADMIN — OXYCODONE HYDROCHLORIDE 10 MG: 10 TABLET, FILM COATED, EXTENDED RELEASE ORAL at 06:46

## 2023-12-14 RX ADMIN — SUGAMMADEX 200 MG: 100 INJECTION, SOLUTION INTRAVENOUS at 08:36

## 2023-12-14 RX ADMIN — ROCURONIUM BROMIDE 10 MG: 50 INJECTION, SOLUTION INTRAVENOUS at 08:25

## 2023-12-14 RX ADMIN — LISINOPRIL 10 MG: 10 TABLET ORAL at 14:53

## 2023-12-14 RX ADMIN — FENTANYL CITRATE 50 MCG: 50 INJECTION, SOLUTION INTRAMUSCULAR; INTRAVENOUS at 10:22

## 2023-12-14 RX ADMIN — PROPOFOL 120 MG: 10 INJECTION, EMULSION INTRAVENOUS at 07:13

## 2023-12-14 RX ADMIN — CEFAZOLIN 2 G: 1 INJECTION, POWDER, FOR SOLUTION INTRAMUSCULAR; INTRAVENOUS at 07:13

## 2023-12-14 RX ADMIN — POTASSIUM CHLORIDE AND SODIUM CHLORIDE: 900; 150 INJECTION, SOLUTION INTRAVENOUS at 15:03

## 2023-12-14 RX ADMIN — ACETAMINOPHEN 650 MG: 325 TABLET, FILM COATED ORAL at 14:53

## 2023-12-14 RX ADMIN — SCOPOLAMINE 1 PATCH: 1.5 PATCH, EXTENDED RELEASE TRANSDERMAL at 06:47

## 2023-12-14 RX ADMIN — ONDANSETRON 4 MG: 2 INJECTION INTRAMUSCULAR; INTRAVENOUS at 10:12

## 2023-12-14 RX ADMIN — LIDOCAINE HYDROCHLORIDE 100 MG: 20 INJECTION, SOLUTION EPIDURAL; INFILTRATION; INTRACAUDAL at 07:13

## 2023-12-14 RX ADMIN — PROPOFOL 100 MCG/KG/MIN: 10 INJECTION, EMULSION INTRAVENOUS at 07:14

## 2023-12-14 RX ADMIN — ONDANSETRON 4 MG: 2 INJECTION INTRAMUSCULAR; INTRAVENOUS at 20:05

## 2023-12-14 RX ADMIN — GLYCOPYRROLATE 0.2 MG: 0.2 INJECTION INTRAMUSCULAR; INTRAVENOUS at 07:20

## 2023-12-14 RX ADMIN — EPHEDRINE SULFATE 10 MG: 50 INJECTION, SOLUTION INTRAVENOUS at 09:52

## 2023-12-14 RX ADMIN — OXYCODONE HYDROCHLORIDE 10 MG: 5 SOLUTION ORAL at 11:41

## 2023-12-14 RX ADMIN — TRANEXAMIC ACID 1000 MG: 100 INJECTION, SOLUTION INTRAVENOUS at 07:37

## 2023-12-14 RX ADMIN — FENTANYL CITRATE 50 MCG: 50 INJECTION, SOLUTION INTRAMUSCULAR; INTRAVENOUS at 11:42

## 2023-12-14 RX ADMIN — ANTACID TABLETS 500 MG: 500 TABLET, CHEWABLE ORAL at 17:32

## 2023-12-14 RX ADMIN — AMLODIPINE BESYLATE 2.5 MG: 5 TABLET ORAL at 14:53

## 2023-12-14 RX ADMIN — Medication: at 15:14

## 2023-12-14 RX ADMIN — ATROPINE SULFATE 0.4 MG: 0.4 INJECTION, SOLUTION INTRAVENOUS at 07:22

## 2023-12-14 RX ADMIN — FENTANYL CITRATE 50 MCG: 50 INJECTION, SOLUTION INTRAMUSCULAR; INTRAVENOUS at 10:18

## 2023-12-14 RX ADMIN — DOCUSATE SODIUM 100 MG: 100 CAPSULE, LIQUID FILLED ORAL at 17:32

## 2023-12-14 RX ADMIN — ACETAMINOPHEN 1000 MG: 500 TABLET, FILM COATED ORAL at 06:47

## 2023-12-14 RX ADMIN — EPHEDRINE SULFATE 10 MG: 50 INJECTION, SOLUTION INTRAVENOUS at 07:24

## 2023-12-14 RX ADMIN — CEFAZOLIN 2 G: 2 INJECTION, POWDER, FOR SOLUTION INTRAMUSCULAR; INTRAVENOUS at 15:05

## 2023-12-14 RX ADMIN — FENTANYL CITRATE 50 MCG: 50 INJECTION, SOLUTION INTRAMUSCULAR; INTRAVENOUS at 09:01

## 2023-12-14 RX ADMIN — ROCURONIUM BROMIDE 10 MG: 50 INJECTION, SOLUTION INTRAVENOUS at 07:56

## 2023-12-14 RX ADMIN — SODIUM CHLORIDE, POTASSIUM CHLORIDE, SODIUM LACTATE AND CALCIUM CHLORIDE: 600; 310; 30; 20 INJECTION, SOLUTION INTRAVENOUS at 08:15

## 2023-12-14 RX ADMIN — EPHEDRINE SULFATE 10 MG: 50 INJECTION, SOLUTION INTRAVENOUS at 09:19

## 2023-12-14 RX ADMIN — DEXAMETHASONE SODIUM PHOSPHATE 4 MG: 4 INJECTION INTRA-ARTICULAR; INTRALESIONAL; INTRAMUSCULAR; INTRAVENOUS; SOFT TISSUE at 07:13

## 2023-12-14 ASSESSMENT — PAIN DESCRIPTION - PAIN TYPE
TYPE: SURGICAL PAIN

## 2023-12-14 ASSESSMENT — PAIN SCALES - GENERAL: PAIN_LEVEL: 4

## 2023-12-14 ASSESSMENT — PATIENT HEALTH QUESTIONNAIRE - PHQ9
SUM OF ALL RESPONSES TO PHQ9 QUESTIONS 1 AND 2: 0
2. FEELING DOWN, DEPRESSED, IRRITABLE, OR HOPELESS: NOT AT ALL
1. LITTLE INTEREST OR PLEASURE IN DOING THINGS: NOT AT ALL

## 2023-12-14 ASSESSMENT — FIBROSIS 4 INDEX: FIB4 SCORE: 1.38

## 2023-12-14 NOTE — PROGRESS NOTES
4 Eyes Skin Assessment Completed by STARR Dorantes and STARR Rivas.    Head WDL  Ears WDL  Nose WDL  Mouth WDL  Neck WDL  Breast/Chest WDL  Shoulder Blades heals scar on right shoulder  Spine Incision x 2 sites with silver mepilex, CD and I  (R) Arm/Elbow/Hand WDL  (L) Arm/Elbow/Hand Bruising and Scabs hand and forearm  Abdomen Scar healed  Groin WDL  Scrotum/Coccyx/Buttocks WDL  (R) Leg discoloration on lower leg  (L) Leg discoloration on lower leg  (R) Heel/Foot/Toe scab on third digit  (L) Heel/Foot/Toe WDL          Devices In Places blood pressure, pulse ox, guardado, SCD's, hemovac drain      Interventions In Place Gray Ear Foams, NC W/Ear Foams, Heel Mepilex, Waffle Overlay, Pillows, Q2 Turns, and Pressure Redistribution Mattress    Possible Skin Injury No    Pictures Uploaded Into Epic N/A  Wound Consult Placed N/A  RN Wound Prevention Protocol Ordered No

## 2023-12-14 NOTE — OR NURSING
1035 Patient arrived from OR via bed, siderails up, brake locked. Jaw thrust performed to open airway. Received report from Dr. Johns and OR RN, assumed care. VSS. Back dressings CDI. Hemovac and Lopez in place. Patient appears comfortable, no s/s of pain or nausea noted. Orders reviewed and implemented. 1050 Rouses to voice, follows commands to wiggle toes and  hands. Neuro intact.  1105 Patient more awake. Moving all extremities spontaneously. . Denies pain or nausea. Dr. Palafox bedside for assessment.  1110 Oriented x 4. Tolerating ice chips.  1125 Family updated.   1135 Ordered lab drawn.  1141 Fentanyl and Oxycodone administered for reported pain.  1200 Resting comfortably, reports pain improved and tolerable. VSS. Back dressing remains CDI. Recovery complete, awaiting room on floor.  1311 Report to Jayna ROLDAN RN.   1325 Discharged to room with oxygen, belongings and PCA pump/tubing.

## 2023-12-14 NOTE — ANESTHESIA PREPROCEDURE EVALUATION
Case: [id number] Date/Time: 12/14/23 0645    Procedures:       POSTERIOR L3-4, L5-S1 LAMINECTOMIES WITH A L3-4 EXTENSION OF FUSION WITH INTERBODY      LAMINECTOMY, SPINE, LUMBAR, WITH DISCECTOMY    Pre-op diagnosis: SPINAL STENOSIS OF LUMBAR REGION    Location: [facility] OR 04 / SURGERY [facility]    Surgeons: Geovany Palafox M.D.            Relevant Problems   ANESTHESIA   (positive) PONV (postoperative nausea and vomiting)      CARDIAC   (positive) HTN (hypertension)       Physical Exam    Airway   Mallampati: II  TM distance: >3 FB  Neck ROM: full       Cardiovascular - normal exam  Rhythm: regular  Rate: normal  (-) murmur     Dental - normal exam           Pulmonary - normal exam  Breath sounds clear to auscultation     Abdominal    Neurological - normal exam                   Anesthesia Plan    ASA 2       Plan - general       Airway plan will be ETT          Induction: intravenous    Postoperative Plan: Postoperative administration of opioids is intended.    Pertinent diagnostic labs and testing reviewed    Informed Consent:    Anesthetic plan and risks discussed with patient.    Use of blood products discussed with: patient whom consented to blood products.

## 2023-12-14 NOTE — ANESTHESIA POSTPROCEDURE EVALUATION
Patient: Rin Yung    Procedure Summary       Date: 12/14/23 Room / Location: [facility] 04 / SURGERY [facility]    Anesthesia Start: 0709 Anesthesia Stop: 1039    Procedures:       POSTERIOR L3-4, L5-S1 LAMINECTOMIES WITH A L3-4 EXTENSION OF FUSION WITH INTERBODY, DURAL TEAR REPAIR (Back)      LAMINECTOMY, SPINE, LUMBAR, WITH DISCECTOMY (Back) Diagnosis: (SPINAL STENOSIS OF LUMBAR REGION)    Surgeons: Geovany Palafox M.D. Responsible Provider: Randy Johns M.D.    Anesthesia Type: general ASA Status: 2            Final Anesthesia Type: general  Last vitals  BP   Blood Pressure : 139/84    Temp   36.3 °C (97.4 °F)    Pulse   75   Resp   13    SpO2   94 %      Anesthesia Post Evaluation    Patient location during evaluation: PACU  Patient participation: complete - patient participated  Level of consciousness: awake and alert  Pain score: 4    Airway patency: patent  Anesthetic complications: no  Cardiovascular status: hemodynamically stable  Respiratory status: acceptable  Hydration status: euvolemic    PONV: none          No notable events documented.     Nurse Pain Score: 4 (NPRS)

## 2023-12-14 NOTE — PROGRESS NOTES
Neurosurgery update    Patient will be admitted as inpatient. She had a redo operation and has multilevel fusion requiring PT/OT prior to discharge.  She sustained a dural tear and will be on bedrest and she will require labs checked for 2 days.

## 2023-12-14 NOTE — ANESTHESIA PROCEDURE NOTES
Airway    Date/Time: 12/14/2023 7:15 AM    Performed by: Randy Johns M.D.  Authorized by: Randy Johns M.D.    Location:  OR  Urgency:  Elective  Indications for Airway Management:  Anesthesia      Spontaneous Ventilation: absent    Sedation Level:  Deep  Preoxygenated: Yes    Patient Position:  Sniffing  Mask Difficulty Assessment:  1 - vent by mask  Final Airway Type:  Endotracheal airway  Final Endotracheal Airway:  ETT  Cuffed: Yes    Technique Used for Successful ETT Placement:  Direct laryngoscopy    Insertion Site:  Oral  Blade Type:  Hui  Laryngoscope Blade/Videolaryngoscope Blade Size:  3  ETT Size (mm):  7.0  Measured from:  Teeth  ETT to Teeth (cm):  22  Placement Verified by: auscultation and capnometry    Cormack-Lehane Classification:  Grade IIa - partial view of glottis  Number of Attempts at Approach:  1

## 2023-12-14 NOTE — PROGRESS NOTES
Medication history reviewed with PT at bedside    [facility] rec is complete per PT reporting    Allergies reviewed.     Patient denies any outpatient antibiotics in the last 30 days.     Patient is not taking anticoagulants.    Preferred pharmacy for this visit - [facility] on [address] in [place] ([phone])

## 2023-12-14 NOTE — ANESTHESIA TIME REPORT
Anesthesia Start and Stop Event Times       Date Time Event    12/14/2023 0652 Ready for Procedure     0709 Anesthesia Start     1039 Anesthesia Stop          Responsible Staff  12/14/23      Name Role Begin End    Randy Johns M.D. Anesth 0709 1039          Overtime Reason:  no overtime (within assigned shift)    Comments:

## 2023-12-15 LAB
ANION GAP SERPL CALC-SCNC: 7 MMOL/L (ref 7–16)
BUN SERPL-MCNC: 11 MG/DL (ref 8–22)
CALCIUM SERPL-MCNC: 8.3 MG/DL (ref 8.5–10.5)
CHLORIDE SERPL-SCNC: 102 MMOL/L (ref 96–112)
CO2 SERPL-SCNC: 27 MMOL/L (ref 20–33)
CREAT SERPL-MCNC: 0.4 MG/DL (ref 0.5–1.4)
ERYTHROCYTE [DISTWIDTH] IN BLOOD BY AUTOMATED COUNT: 43.6 FL (ref 35.9–50)
GFR SERPLBLD CREATININE-BSD FMLA CKD-EPI: 103 ML/MIN/1.73 M 2
GLUCOSE SERPL-MCNC: 112 MG/DL (ref 65–99)
HCT VFR BLD AUTO: 34.1 % (ref 37–47)
HGB BLD-MCNC: 11.9 G/DL (ref 12–16)
MCH RBC QN AUTO: 33.9 PG (ref 27–33)
MCHC RBC AUTO-ENTMCNC: 34.9 G/DL (ref 32.2–35.5)
MCV RBC AUTO: 97.2 FL (ref 81.4–97.8)
PLATELET # BLD AUTO: 234 K/UL (ref 164–446)
PMV BLD AUTO: 9.3 FL (ref 9–12.9)
POTASSIUM SERPL-SCNC: 4 MMOL/L (ref 3.6–5.5)
RBC # BLD AUTO: 3.51 M/UL (ref 4.2–5.4)
SODIUM SERPL-SCNC: 136 MMOL/L (ref 135–145)
WBC # BLD AUTO: 8.3 K/UL (ref 4.8–10.8)

## 2023-12-15 PROCEDURE — 85027 COMPLETE CBC AUTOMATED: CPT

## 2023-12-15 PROCEDURE — 700111 HCHG RX REV CODE 636 W/ 250 OVERRIDE (IP): Mod: JZ | Performed by: NURSE PRACTITIONER

## 2023-12-15 PROCEDURE — 700102 HCHG RX REV CODE 250 W/ 637 OVERRIDE(OP): Performed by: NURSE PRACTITIONER

## 2023-12-15 PROCEDURE — 770001 HCHG ROOM/CARE - MED/SURG/GYN PRIV*

## 2023-12-15 PROCEDURE — 36415 COLL VENOUS BLD VENIPUNCTURE: CPT

## 2023-12-15 PROCEDURE — 700101 HCHG RX REV CODE 250: Performed by: NURSE PRACTITIONER

## 2023-12-15 PROCEDURE — 80048 BASIC METABOLIC PNL TOTAL CA: CPT

## 2023-12-15 PROCEDURE — A9270 NON-COVERED ITEM OR SERVICE: HCPCS | Performed by: NURSE PRACTITIONER

## 2023-12-15 PROCEDURE — 97163 PT EVAL HIGH COMPLEX 45 MIN: CPT

## 2023-12-15 PROCEDURE — 700105 HCHG RX REV CODE 258: Performed by: NURSE PRACTITIONER

## 2023-12-15 RX ORDER — OXYCODONE HYDROCHLORIDE 5 MG/1
5-10 TABLET ORAL EVERY 4 HOURS PRN
Status: DISCONTINUED | OUTPATIENT
Start: 2023-12-15 | End: 2023-12-19 | Stop reason: HOSPADM

## 2023-12-15 RX ORDER — METOCLOPRAMIDE HYDROCHLORIDE 5 MG/ML
10 INJECTION INTRAMUSCULAR; INTRAVENOUS EVERY 6 HOURS PRN
Status: DISCONTINUED | OUTPATIENT
Start: 2023-12-15 | End: 2023-12-18

## 2023-12-15 RX ORDER — HYDROMORPHONE HYDROCHLORIDE 1 MG/ML
0.5 INJECTION, SOLUTION INTRAMUSCULAR; INTRAVENOUS; SUBCUTANEOUS
Status: DISCONTINUED | OUTPATIENT
Start: 2023-12-15 | End: 2023-12-19 | Stop reason: HOSPADM

## 2023-12-15 RX ADMIN — POTASSIUM CHLORIDE AND SODIUM CHLORIDE: 900; 150 INJECTION, SOLUTION INTRAVENOUS at 11:44

## 2023-12-15 RX ADMIN — ACETAMINOPHEN 650 MG: 325 TABLET, FILM COATED ORAL at 05:29

## 2023-12-15 RX ADMIN — ANTACID TABLETS 500 MG: 500 TABLET, CHEWABLE ORAL at 05:29

## 2023-12-15 RX ADMIN — DOCUSATE SODIUM 100 MG: 100 CAPSULE, LIQUID FILLED ORAL at 16:49

## 2023-12-15 RX ADMIN — METOPROLOL TARTRATE 25 MG: 25 TABLET, FILM COATED ORAL at 16:51

## 2023-12-15 RX ADMIN — ACETAMINOPHEN 650 MG: 325 TABLET, FILM COATED ORAL at 16:49

## 2023-12-15 RX ADMIN — ONDANSETRON 4 MG: 2 INJECTION INTRAMUSCULAR; INTRAVENOUS at 23:30

## 2023-12-15 RX ADMIN — POTASSIUM CHLORIDE AND SODIUM CHLORIDE: 900; 150 INJECTION, SOLUTION INTRAVENOUS at 02:14

## 2023-12-15 RX ADMIN — OXYCODONE 5 MG: 5 TABLET ORAL at 18:00

## 2023-12-15 RX ADMIN — POLYETHYLENE GLYCOL 3350 1 PACKET: 17 POWDER, FOR SOLUTION ORAL at 17:04

## 2023-12-15 RX ADMIN — ACETAMINOPHEN 650 MG: 325 TABLET, FILM COATED ORAL at 11:29

## 2023-12-15 RX ADMIN — ACETAMINOPHEN 650 MG: 325 TABLET, FILM COATED ORAL at 23:47

## 2023-12-15 RX ADMIN — DIAZEPAM 5 MG: 5 TABLET ORAL at 23:47

## 2023-12-15 RX ADMIN — OXYCODONE 5 MG: 5 TABLET ORAL at 22:16

## 2023-12-15 RX ADMIN — ESCITALOPRAM OXALATE 10 MG: 10 TABLET ORAL at 05:29

## 2023-12-15 RX ADMIN — CEFAZOLIN 2 G: 2 INJECTION, POWDER, FOR SOLUTION INTRAMUSCULAR; INTRAVENOUS at 00:17

## 2023-12-15 RX ADMIN — ONDANSETRON 4 MG: 2 INJECTION INTRAMUSCULAR; INTRAVENOUS at 09:49

## 2023-12-15 RX ADMIN — ANTACID TABLETS 500 MG: 500 TABLET, CHEWABLE ORAL at 16:50

## 2023-12-15 RX ADMIN — POTASSIUM CHLORIDE AND SODIUM CHLORIDE: 900; 150 INJECTION, SOLUTION INTRAVENOUS at 23:33

## 2023-12-15 RX ADMIN — DOCUSATE SODIUM 50 MG AND SENNOSIDES 8.6 MG 1 TABLET: 8.6; 5 TABLET, FILM COATED ORAL at 21:25

## 2023-12-15 RX ADMIN — POLYETHYLENE GLYCOL 3350 1 PACKET: 17 POWDER, FOR SOLUTION ORAL at 05:29

## 2023-12-15 RX ADMIN — METOPROLOL TARTRATE 25 MG: 25 TABLET, FILM COATED ORAL at 05:29

## 2023-12-15 RX ADMIN — AMLODIPINE BESYLATE 2.5 MG: 5 TABLET ORAL at 05:29

## 2023-12-15 RX ADMIN — Medication 1 APPLICATOR: at 05:30

## 2023-12-15 RX ADMIN — LISINOPRIL 10 MG: 10 TABLET ORAL at 05:29

## 2023-12-15 RX ADMIN — DOCUSATE SODIUM 100 MG: 100 CAPSULE, LIQUID FILLED ORAL at 05:29

## 2023-12-15 ASSESSMENT — PAIN DESCRIPTION - PAIN TYPE
TYPE: SURGICAL PAIN
TYPE: SURGICAL PAIN;ACUTE PAIN
TYPE: ACUTE PAIN;SURGICAL PAIN
TYPE: SURGICAL PAIN

## 2023-12-15 ASSESSMENT — LIFESTYLE VARIABLES
TOTAL SCORE: 0
HAVE PEOPLE ANNOYED YOU BY CRITICIZING YOUR DRINKING: NO
AVERAGE NUMBER OF DAYS PER WEEK YOU HAVE A DRINK CONTAINING ALCOHOL: 0
HOW MANY TIMES IN THE PAST YEAR HAVE YOU HAD 5 OR MORE DRINKS IN A DAY: 0
EVER FELT BAD OR GUILTY ABOUT YOUR DRINKING: NO
CONSUMPTION TOTAL: NEGATIVE
HAVE YOU EVER FELT YOU SHOULD CUT DOWN ON YOUR DRINKING: NO
EVER HAD A DRINK FIRST THING IN THE MORNING TO STEADY YOUR NERVES TO GET RID OF A HANGOVER: NO
ALCOHOL_USE: NO
DOES PATIENT WANT TO STOP DRINKING: NO
TOTAL SCORE: 0
ON A TYPICAL DAY WHEN YOU DRINK ALCOHOL HOW MANY DRINKS DO YOU HAVE: 0
TOTAL SCORE: 0

## 2023-12-15 ASSESSMENT — COGNITIVE AND FUNCTIONAL STATUS - GENERAL
MOBILITY SCORE: 15
TURNING FROM BACK TO SIDE WHILE IN FLAT BAD: A LOT
MOVING TO AND FROM BED TO CHAIR: A LOT
MOVING FROM LYING ON BACK TO SITTING ON SIDE OF FLAT BED: A LOT
CLIMB 3 TO 5 STEPS WITH RAILING: A LITTLE
SUGGESTED CMS G CODE MODIFIER MOBILITY: CK
STANDING UP FROM CHAIR USING ARMS: A LITTLE
WALKING IN HOSPITAL ROOM: A LITTLE

## 2023-12-15 ASSESSMENT — GAIT ASSESSMENTS
DISTANCE (FEET): 40
GAIT LEVEL OF ASSIST: MINIMAL ASSIST
ASSISTIVE DEVICE: FRONT WHEEL WALKER
DEVIATION: SHUFFLED GAIT;BRADYKINETIC;DECREASED BASE OF SUPPORT

## 2023-12-15 NOTE — PROGRESS NOTES
Neurosurgery Progress Note    Subjective:  Pt laying flat, denies h/a, c/o nausea, denies leg pain    Exam:  AAO, cooperative, df/pf- 5/5, incision with drsg, hvac- 15ml    BP  Min: 101/67  Max: 153/96  Pulse  Av.4  Min: 60  Max: 81  Resp  Av  Min: 13  Max: 19  Temp  Av.8 °C (98.2 °F)  Min: 36.3 °C (97.4 °F)  Max: 37.3 °C (99.1 °F)  Monitored Temp 2  Av.9 °C (98.4 °F)  Min: 36.5 °C (97.7 °F)  Max: 37.2 °C (99 °F)  SpO2  Av.8 %  Min: 88 %  Max: 98 %    No data recorded    Recent Labs     23  1140 12/15/23  0712   WBC  --  8.3   RBC  --  3.51*   HEMOGLOBIN 13.0 11.9*   HEMATOCRIT 37.5 34.1*   MCV  --  97.2   MCH  --  33.9*   MCHC  --  34.9   RDW  --  43.6   PLATELETCT  --  234   MPV  --  9.3     Recent Labs     12/15/23  0712   SODIUM 136   POTASSIUM 4.0   CHLORIDE 102   CO2 27   GLUCOSE 112*   BUN 11   CREATININE 0.40*   CALCIUM 8.3*               Intake/Output                         23 0700 - 12/15/23 0659 12/15/23 0700 - 23 0659     1469-1009 9394-9350 Total 7780-8893 0627-6538 Total                 Intake    I.V.  1999.7  1111.3 3111.1  --  -- --    PCA End of Shift Total Volume (ml) -- 2.6 2.6 -- -- --    Propofol Volume 99.7 -- 99.7 -- -- --    Volume (mL) (0.9 % NaCl with KCl 20 mEq infusion) -- 1108.8 1108.8 -- -- --    Volume (mL) (lactated ringers infusion) 1900 -- 1900 -- -- --    Blood  200  -- 200  --  -- --    Cell Saver 200 -- 200 -- -- --    IV Piggyback  10  208.1 218.1  --  -- --    Volume (mL) (Tranexamic Acid (Cyklokapron) injection) 10 -- 10 -- -- --    Volume (mL) (ceFAZolin (Ancef) 2 g in  mL IVPB) -- 208.1 208.1 -- -- --    Total Intake 2209.7 1319.4 3529.2 -- -- --       Output    Urine  740  325 1065  --  -- --    Urine 100 -- 100 -- -- --    Output (mL) (Urethral Catheter Temperature probe 16 Fr.) 640 325 965 -- -- --    Emesis  --  100 100  --  -- --    Emesis -- 100 100 -- -- --    Drains  15  80 95  --  -- --    Output (mL) (Closed/Suction  Drain Back Hemovac) 15 80 95 -- -- --    Blood  500  -- 500  --  -- --    Est. Blood Loss 500 -- 500 -- -- --    Total Output 6330 678 5817 -- -- --       Net I/O     954.7 814.4 1769.2 -- -- --              Intake/Output Summary (Last 24 hours) at 12/15/2023 1011  Last data filed at 12/15/2023 0400  Gross per 24 hour   Intake 2529.16 ml   Output 1260 ml   Net 1269.16 ml             Nozin nasal  swab  1 Applicator BID    oxyCODONE immediate-release  5-10 mg Q4HRS PRN    HYDROmorphone  0.5 mg Q3HRS PRN    metoclopramide  10 mg Q6HRS PRN    scopolamine  1 Patch Q72HRS    amLODIPine  2.5 mg DAILY    escitalopram  10 mg DAILY    lisinopril  10 mg DAILY    metoprolol tartrate  25 mg BID    Pharmacy Consult Request  1 Each PHARMACY TO DOSE    MD ALERT...DO NOT ADMINISTER NSAIDS or ASPIRIN unless ORDERED By Neurosurgery  1 Each PRN    docusate sodium  100 mg BID    senna-docusate  1 Tablet Nightly    senna-docusate  1 Tablet Q24HRS PRN    polyethylene glycol/lytes  1 Packet BID PRN    magnesium hydroxide  30 mL QDAY PRN    bisacodyl  10 mg Q24HRS PRN    sodium phosphate  1 Each Once PRN    0.9 % NaCl with KCl 20 mEq 1,000 mL   Continuous    acetaminophen  650 mg Q6HRS    Followed by    [START ON 12/19/2023] acetaminophen  650 mg Q6HRS PRN    diphenhydrAMINE  25 mg Q6HRS PRN    Or    diphenhydrAMINE  25 mg Q6HRS PRN    ondansetron  4 mg Q4HRS PRN    ondansetron  4 mg Q4HRS PRN    tizanidine  2 mg TID PRN    diazePAM  5 mg Q6HRS PRN    calcium carbonate  500 mg BID       Assessment and Plan:  POD# 1 s/p L3-4 TLIF ext of fusion  Chemical prophylactic DVT therapy: No  Start date/time: n/a    Dc pca- begin orals  Added Reglan for nausea  Dc drain  Begin to raise hob  Dc guardado when ambulatory  Pt/ot when able to sit up w/o h/a  Dispo pending

## 2023-12-15 NOTE — PROGRESS NOTES
0655 Patient's in bed. Bedside report received from LINDA Lovelace RN at the beginning of the shift.    0810 JOHNNY Kang visited. POC discussed with the patient.    0815 Patient's in bed.  On bedrest/flat as per order. Educated on the importance/use of IS at least 10x every hour while awake, able to reach 1250. Rates bebeto pain 4/10, on Dilaudid PCA. FC to DD. Fall protocol in effect. Call light within reach. Reminded patient to call for assist. Assessment completed. No distress noted. Plan of care reviewed with the patient. Verbalized understanding.    0949 Medicated with Zofran (see MAR) for c/o's nausea and vomiting.    1009 New order received and acknowledged from JOHNNY Kang - (see MAR), d/c drain, d/c guardado catheter when ambulatory.    1030 HOB elevated slowly as per order. Denies headache.    1045 HOB elevated slowly. Denies headache.     1131 Dilaudid PCA dc'd as per order.    1135 Hemovac drain removed as per order. Dressing changed as per order.    1220 Received a call from Alin (son). Verified patient's baseline with cognition. Per the said son patient has memory issues for a year.     1310 Patient's sitting up EOB. Denies headache. No distress noted.    1530 TOSHA Millan worked with the patient then up to a chair. .    1545 Patient tried getting out of the chair. Chair alarm in use. Reminded to call for assist.    1705 Patient's in bed. Tried getting out of bed. Bed alarm and bed frame alarm in use.     1742 Tele sitter initiated for safety.    1800 Medicated with Oxycodone (see MAR) for c/o's back pain, rates pain 6/10.    1838 Received a call from Alin (son). POC discussed and notified that Tele sitter was started for safety due to being confused and getting OOB/chair without calling . Questions answered. Verbalized understanding.    1910  Patient's in bed. Bedside report given to Ivan MCKEON RN ([place]).

## 2023-12-15 NOTE — PROGRESS NOTES
Large LSO fit to patient.     Met with patient to educate on proper fit and use of the brace. The brace fits well and the patient is comfortable with donning and doffing the brace off of themself. Patient is instructed to take the brace home and wear according to direction after discharge. All questions and concerns answered, patient given copy of [facility] form should any questions arise in the future.

## 2023-12-15 NOTE — CARE PLAN
The patient is Stable - Low risk of patient condition declining or worsening    Shift Goals  Clinical Goals: pain control, bedrest  Patient Goals: pain control, comfort  Family Goals: no family present    Progress made toward(s) clinical / shift goals:    Problem: Pain - Standard  Goal: Alleviation of pain or a reduction in pain to the patient’s comfort goal  Outcome: Progressing   Educated on pain scale. Encouraged to verbalize pain. Will medicate as per MAR.    Problem: Knowledge Deficit - Standard  Goal: Patient and family/care givers will demonstrate understanding of plan of care, disease process/condition, diagnostic tests and medications  Outcome: Progressing   POC discussed with the patient and son (over the phone). PT and OT Eval after bedrest order was dc'd. Questions answered. Verbalized understanding.     Problem: Fall Risk  Goal: Patient will remain free from falls  Outcome: Progressing   Fall protocol in effect. Call light within reach. Reminded patient to call for assist. Kept room clutter free. Hourly rounds in effect. Verbalized understanding.    Patient is not progressing towards the following goals:

## 2023-12-15 NOTE — CARE PLAN
The patient is Stable - Low risk of patient condition declining or worsening    Shift Goals  Clinical Goals: Skin integrity, Pain control, safety  Patient Goals: Comfort  Family Goals: Not present    Progress made toward(s) clinical / shift goals:    Problem: Pain - Standard  Goal: Alleviation of pain or a reduction in pain to the patient’s comfort goal  Outcome: Progressing  Note: Patient educated on pain scale and to notify RN of pain. Medicated per MAR and repositioned        Problem: Knowledge Deficit - Standard  Goal: Patient and family/care givers will demonstrate understanding of plan of care, disease process/condition, diagnostic tests and medications  Outcome: Progressing  Note: Plan of care discussed, verbalized understanding. Questions answered        Problem: Skin Integrity  Goal: Skin integrity is maintained or improved  Outcome: Progressing     Problem: Fall Risk  Goal: Patient will remain free from falls  Outcome: Progressing       Patient is not progressing towards the following goals:

## 2023-12-15 NOTE — THERAPY
Physical Therapy   Initial Evaluation     Patient Name: Rin Yung  Age:  73 y.o., Sex:  female  Medical Record #: [id number]  Today's Date: 12/15/2023     Precautions  Precautions: Fall Risk;Spinal / Back Precautions ;Lumbosacral Orthosis  Comments: LSO when OOB>5min    Assessment  Patient is 73 y.o. female POD #1 posterior L3-4, L5-S1 laminectomies with L3-4 fusion and dural repair. Pt is 24 hours post bed rest and had sat EOB with RN without complaints of headache. No brace at bedside, per orders [facility] has been contacted. Physician in room at time of evaluation and confirmed off the shelf LSO is ok and pt can mobilize prior to arrival. Pt confused and required max cues for compliance with spine precautions and to decrease fall risk. Min assist required for log roll, transfers, and gait using FWW. PT will cont while pt is in acute care setting to address deficits.     Plan    Physical Therapy Initial Treatment Plan   Treatment Plan : Bed Mobility, Gait Training, Neuro Re-Education / Balance, Self Care / Home Evaluation, Therapeutic Activities, Therapeutic Exercise  Treatment Frequency: 5 Times per Week  Duration: Until Therapy Goals Met    DC Equipment Recommendations: Unable to determine at this time  Discharge Recommendations: Recommend post-acute placement for additional physical therapy services prior to discharge home          12/15/23 1534   Vitals   O2 (LPM) 1   O2 Delivery Device Nasal Cannula   Pain 0 - 10 Group   Therapist Pain Assessment During Activity;0   Non Verbal Descriptors   Non Verbal Scale  Calm   Prior Living Situation   Prior Services Home-Independent   Housing / Facility 1 Story House   Steps Into Home 1   Steps In Home 0   Equipment Owned Front-Wheel Walker   Lives with - Patient's Self Care Capacity Alone and Unable to Care For Self   Comments pt reports she has a son in [place] who can check on her   Prior Level of Functional Mobility   Bed Mobility Independent   Transfer Status  Independent   Ambulation Independent   Ambulation Distance community   Assistive Devices Used None   Stairs Independent   Cognition    Cognition / Consciousness X   Orientation Level Not Oriented to Time;Not Oriented to Day   Level of Consciousness Alert   Ability To Follow Commands 1 Step   Safety Awareness Impaired;Impulsive   New Learning Impaired   Attention Impaired   Comments unclear baseline   Passive ROM Lower Body   Passive ROM Lower Body WDL   Active ROM Lower Body    Active ROM Lower Body  WDL   Strength Lower Body   Lower Body Strength  X   Comments LLE 4/5   Sensation Lower Body   Lower Extremity Sensation   Not Tested   Comments unable to assess due to pts cognition   Lower Body Muscle Tone   Lower Body Muscle Tone  WDL   Other Treatments   Other Treatments Provided max cues provided throughout for sequencing and use of FWW.   Balance Assessment   Sitting Balance (Static) Fair   Sitting Balance (Dynamic) Fair   Standing Balance (Static) Fair -   Standing Balance (Dynamic) Poor +   Weight Shift Sitting Fair   Weight Shift Standing Fair   Comments FWW   Bed Mobility    Supine to Sit Minimal Assist   Sit to Supine Minimal Assist   Scooting Minimal Assist   Rolling Minimum Assist to Lt.   Gait Analysis   Gait Level Of Assist Minimal Assist   Assistive Device Front Wheel Walker   Distance (Feet) 40   # of Times Distance was Traveled 1   Deviation Shuffled Gait;Bradykinetic;Decreased Base Of Support   Weight Bearing Status no restrictions   Comments pt impulsive   Functional Mobility   Sit to Stand Minimal Assist   Bed, Chair, Wheelchair Transfer Minimal Assist   Transfer Method Stand Step   Mobility in room and hallway with FWW   Edema / Skin Assessment   Edema / Skin  Not Assessed   Patient / Family Goals    Patient / Family Goal #1 none stated   Short Term Goals    Short Term Goal # 1 pt will be able to complete supine<>sitting from flat bed with SPV in 6tx in order to improive independence   Short Term  Goal # 2 pt will be able to complete functional transfers with FWW and SPV in 6tx in order to progress with therapy   Short Term Goal # 3 pt will be able to ambulate 100ft with FWW and SPV in 6tx in order to decresae fall risk   Education Group   Education Provided Role of Physical Therapist;Spine Precautions   Spine Precautions Patient Response Patient;Acceptance;Explanation;Demonstration;Reinforcement Needed;No Learning Evidence   Role of Physical Therapist Patient Response Patient;Acceptance;Demonstration;Action Demonstration   Additional Comments no LSO present, defer education to OT tomorrow   Anticipated Discharge Equipment and Recommendations   DC Equipment Recommendations Unable to determine at this time   Discharge Recommendations Recommend post-acute placement for additional physical therapy services prior to discharge home

## 2023-12-15 NOTE — PROGRESS NOTES
1305 Report received from Jumana PACU RN.    1340 Received patient from PACU via bed accompanied by one female Transport. Son visiting. On bedrest/flat as per order. Bed waffle cushion and bed alarm and SCD's placed. Fall protocol in  Effect. Call light within reach. Reminded patient to call for assist.    1514 Assessment completed. No distress noted. Educate don the importance/se of IS at least 10x every hour while awake, able to reach 1750. FC to DD.Rates back pain 7/10, on Dilaudid PCA.    1730 Patient's in bed. No distress noted.    1930 Patient's in bed. No changes in status. Bedside report given to Ivan MCKEON RN ([place]).

## 2023-12-15 NOTE — THERAPY
Physical Therapy Contact Note    Patient Name: Rin Yung  Age:  73 y.o., Sex:  female  Medical Record #: [id number]  Today's Date: 12/15/2023    PT order received, pt on bed rest post dural tear. PT evaluation will be hold until pt is cleared for mobility.    Yana Rodriguez PT, DPT

## 2023-12-16 LAB
ANION GAP SERPL CALC-SCNC: 8 MMOL/L (ref 7–16)
BUN SERPL-MCNC: 12 MG/DL (ref 8–22)
CALCIUM SERPL-MCNC: 8.5 MG/DL (ref 8.5–10.5)
CHLORIDE SERPL-SCNC: 99 MMOL/L (ref 96–112)
CO2 SERPL-SCNC: 23 MMOL/L (ref 20–33)
CREAT SERPL-MCNC: 0.28 MG/DL (ref 0.5–1.4)
ERYTHROCYTE [DISTWIDTH] IN BLOOD BY AUTOMATED COUNT: 42.5 FL (ref 35.9–50)
GFR SERPLBLD CREATININE-BSD FMLA CKD-EPI: 112 ML/MIN/1.73 M 2
GLUCOSE SERPL-MCNC: 109 MG/DL (ref 65–99)
HCT VFR BLD AUTO: 33.4 % (ref 37–47)
HGB BLD-MCNC: 11.6 G/DL (ref 12–16)
MCH RBC QN AUTO: 33.7 PG (ref 27–33)
MCHC RBC AUTO-ENTMCNC: 34.7 G/DL (ref 32.2–35.5)
MCV RBC AUTO: 97.1 FL (ref 81.4–97.8)
PLATELET # BLD AUTO: 236 K/UL (ref 164–446)
PMV BLD AUTO: 9.8 FL (ref 9–12.9)
POTASSIUM SERPL-SCNC: 3.7 MMOL/L (ref 3.6–5.5)
RBC # BLD AUTO: 3.44 M/UL (ref 4.2–5.4)
SODIUM SERPL-SCNC: 130 MMOL/L (ref 135–145)
WBC # BLD AUTO: 9.6 K/UL (ref 4.8–10.8)

## 2023-12-16 PROCEDURE — 700102 HCHG RX REV CODE 250 W/ 637 OVERRIDE(OP): Performed by: NURSE PRACTITIONER

## 2023-12-16 PROCEDURE — 770001 HCHG ROOM/CARE - MED/SURG/GYN PRIV*

## 2023-12-16 PROCEDURE — 700101 HCHG RX REV CODE 250: Performed by: NURSE PRACTITIONER

## 2023-12-16 PROCEDURE — 85027 COMPLETE CBC AUTOMATED: CPT

## 2023-12-16 PROCEDURE — 97530 THERAPEUTIC ACTIVITIES: CPT

## 2023-12-16 PROCEDURE — 97167 OT EVAL HIGH COMPLEX 60 MIN: CPT

## 2023-12-16 PROCEDURE — A9270 NON-COVERED ITEM OR SERVICE: HCPCS | Performed by: NURSE PRACTITIONER

## 2023-12-16 PROCEDURE — 36415 COLL VENOUS BLD VENIPUNCTURE: CPT

## 2023-12-16 PROCEDURE — 700111 HCHG RX REV CODE 636 W/ 250 OVERRIDE (IP): Mod: JZ | Performed by: NURSE PRACTITIONER

## 2023-12-16 PROCEDURE — 80048 BASIC METABOLIC PNL TOTAL CA: CPT

## 2023-12-16 RX ORDER — TIZANIDINE 2 MG/1
2 TABLET ORAL 3 TIMES DAILY PRN
Qty: 30 TABLET | Refills: 3 | Status: ON HOLD | OUTPATIENT
Start: 2023-12-16 | End: 2023-12-26

## 2023-12-16 RX ORDER — OXYCODONE HYDROCHLORIDE 5 MG/1
5-10 TABLET ORAL
Qty: 21 TABLET | Refills: 0 | Status: ON HOLD | OUTPATIENT
Start: 2023-12-16 | End: 2023-12-26

## 2023-12-16 RX ADMIN — AMLODIPINE BESYLATE 2.5 MG: 5 TABLET ORAL at 06:23

## 2023-12-16 RX ADMIN — ESCITALOPRAM OXALATE 10 MG: 10 TABLET ORAL at 06:24

## 2023-12-16 RX ADMIN — OXYCODONE 5 MG: 5 TABLET ORAL at 09:52

## 2023-12-16 RX ADMIN — METOPROLOL TARTRATE 25 MG: 25 TABLET, FILM COATED ORAL at 06:23

## 2023-12-16 RX ADMIN — ACETAMINOPHEN 650 MG: 325 TABLET, FILM COATED ORAL at 12:08

## 2023-12-16 RX ADMIN — METOPROLOL TARTRATE 25 MG: 25 TABLET, FILM COATED ORAL at 17:47

## 2023-12-16 RX ADMIN — DOCUSATE SODIUM 100 MG: 100 CAPSULE, LIQUID FILLED ORAL at 06:23

## 2023-12-16 RX ADMIN — DIAZEPAM 5 MG: 5 TABLET ORAL at 12:13

## 2023-12-16 RX ADMIN — POTASSIUM CHLORIDE AND SODIUM CHLORIDE: 900; 150 INJECTION, SOLUTION INTRAVENOUS at 09:57

## 2023-12-16 RX ADMIN — ANTACID TABLETS 500 MG: 500 TABLET, CHEWABLE ORAL at 06:23

## 2023-12-16 RX ADMIN — POTASSIUM CHLORIDE AND SODIUM CHLORIDE: 900; 150 INJECTION, SOLUTION INTRAVENOUS at 20:01

## 2023-12-16 RX ADMIN — DOCUSATE SODIUM 100 MG: 100 CAPSULE, LIQUID FILLED ORAL at 17:47

## 2023-12-16 RX ADMIN — ONDANSETRON 4 MG: 2 INJECTION INTRAMUSCULAR; INTRAVENOUS at 08:53

## 2023-12-16 RX ADMIN — ACETAMINOPHEN 650 MG: 325 TABLET, FILM COATED ORAL at 17:46

## 2023-12-16 RX ADMIN — TIZANIDINE 2 MG: 4 TABLET ORAL at 17:47

## 2023-12-16 RX ADMIN — OXYCODONE 10 MG: 5 TABLET ORAL at 15:36

## 2023-12-16 RX ADMIN — LISINOPRIL 10 MG: 10 TABLET ORAL at 06:24

## 2023-12-16 RX ADMIN — ANTACID TABLETS 500 MG: 500 TABLET, CHEWABLE ORAL at 17:47

## 2023-12-16 RX ADMIN — ACETAMINOPHEN 650 MG: 325 TABLET, FILM COATED ORAL at 06:23

## 2023-12-16 ASSESSMENT — COGNITIVE AND FUNCTIONAL STATUS - GENERAL
SUGGESTED CMS G CODE MODIFIER DAILY ACTIVITY: CL
STANDING UP FROM CHAIR USING ARMS: A LITTLE
CLIMB 3 TO 5 STEPS WITH RAILING: A LOT
TURNING FROM BACK TO SIDE WHILE IN FLAT BAD: A LOT
DAILY ACTIVITIY SCORE: 13
MOVING FROM LYING ON BACK TO SITTING ON SIDE OF FLAT BED: UNABLE
PERSONAL GROOMING: A LOT
DRESSING REGULAR UPPER BODY CLOTHING: A LOT
SUGGESTED CMS G CODE MODIFIER MOBILITY: CL
MOBILITY SCORE: 12
WALKING IN HOSPITAL ROOM: A LITTLE
MOVING TO AND FROM BED TO CHAIR: UNABLE
HELP NEEDED FOR BATHING: A LOT
EATING MEALS: A LITTLE
DRESSING REGULAR LOWER BODY CLOTHING: A LOT
TOILETING: A LOT

## 2023-12-16 ASSESSMENT — GAIT ASSESSMENTS
GAIT LEVEL OF ASSIST: MINIMAL ASSIST
ASSISTIVE DEVICE: FRONT WHEEL WALKER
DISTANCE (FEET): 50

## 2023-12-16 ASSESSMENT — PAIN DESCRIPTION - PAIN TYPE
TYPE: SURGICAL PAIN
TYPE: SURGICAL PAIN

## 2023-12-16 ASSESSMENT — ACTIVITIES OF DAILY LIVING (ADL): TOILETING: INDEPENDENT

## 2023-12-16 NOTE — PROGRESS NOTES
2000: Patient aox4, able to follow commands, but has short term memory loss, needs frequent re-orientation due to patient is trying to get out of bed. Telesitter in place. Patient was transferred to room closer to RN station. She denies any numbness and tingling sensation.    0100: Patient getting more agitated, impulsive and aggressive, always trying to get out of bed, frequent reorientation still in place, Medicated per MAR. Switch telesitter to human sitter.    Placed SCD. Bed alarm and frame alarm on. Sitter in place. Hourly rounds in place.

## 2023-12-16 NOTE — PROGRESS NOTES
Neurosurgery Progress Note    Subjective:  Agitation/confusion qhs, required sitter  Son at bedside, confirmed her AMS/sundowning is stable x1yr, needs outpt workup by neurology  Pain currently controlled, +voiding    Exam:  AAO, cooperative, df/pf- 5/, incision with drsg, hvac removed    BP  Min: 113/76  Max: 161/99  Pulse  Av.2  Min: 62  Max: 82  Resp  Av  Min: 16  Max: 16  Temp  Av.9 °C (98.4 °F)  Min: 36.5 °C (97.7 °F)  Max: 37.3 °C (99.1 °F)  SpO2  Av.1 %  Min: 88 %  Max: 96 %    No data recorded    Recent Labs     23  1140 12/15/23  0712 23  0123   WBC  --  8.3 9.6   RBC  --  3.51* 3.44*   HEMOGLOBIN 13.0 11.9* 11.6*   HEMATOCRIT 37.5 34.1* 33.4*   MCV  --  97.2 97.1   MCH  --  33.9* 33.7*   MCHC  --  34.9 34.7   RDW  --  43.6 42.5   PLATELETCT  --  234 236   MPV  --  9.3 9.8       Recent Labs     12/15/23  0712 23  0123   SODIUM 136 130*   POTASSIUM 4.0 3.7   CHLORIDE 102 99   CO2 27 23   GLUCOSE 112* 109*   BUN 11 12   CREATININE 0.40* 0.28*   CALCIUM 8.3* 8.5                 Intake/Output                         12/15/23 0700 - 23 0659 23 0700 - 23 0659     8629-0431 4640-5457 Total 8955-5507 6560-3710 Total                 Intake    P.O.  600  -- 600  --  -- --    P.O. 600 -- 600 -- -- --    I.V.  2.3  -- 2.3  --  -- --    PCA End of Shift Total Volume (ml) 2.3 -- 2.3 -- -- --    Total Intake 602.3 -- 602.3 -- -- --       Output    Urine  620  500 1120  --  -- --    Urine Void (mL) -- 500 500 -- -- --    Output (mL) (Urethral Catheter Temperature probe 16 Fr.) 620 -- 620 -- -- --    Drains  20  -- 20  --  -- --    Output (mL) ([REMOVED] Closed/Suction Drain Back Hemovac 12/15/23 1135) 20 -- 20 -- -- --    Total Output  -- -- --       Net I/O     -37.8 -500 -537.8 -- -- --              Intake/Output Summary (Last 24 hours) at 2023 1042  Last data filed at 2023 0419  Gross per 24 hour   Intake 482.25 ml   Output 1140 ml   Net  -657.75 ml               oxyCODONE immediate-release  5-10 mg Q4HRS PRN    HYDROmorphone  0.5 mg Q3HRS PRN    metoclopramide  10 mg Q6HRS PRN    scopolamine  1 Patch Q72HRS    amLODIPine  2.5 mg DAILY    escitalopram  10 mg DAILY    lisinopril  10 mg DAILY    metoprolol tartrate  25 mg BID    Pharmacy Consult Request  1 Each PHARMACY TO DOSE    MD ALERT...DO NOT ADMINISTER NSAIDS or ASPIRIN unless ORDERED By Neurosurgery  1 Each PRN    docusate sodium  100 mg BID    senna-docusate  1 Tablet Nightly    senna-docusate  1 Tablet Q24HRS PRN    polyethylene glycol/lytes  1 Packet BID PRN    magnesium hydroxide  30 mL QDAY PRN    bisacodyl  10 mg Q24HRS PRN    sodium phosphate  1 Each Once PRN    0.9 % NaCl with KCl 20 mEq 1,000 mL   Continuous    acetaminophen  650 mg Q6HRS    Followed by    [START ON 12/19/2023] acetaminophen  650 mg Q6HRS PRN    diphenhydrAMINE  25 mg Q6HRS PRN    Or    diphenhydrAMINE  25 mg Q6HRS PRN    ondansetron  4 mg Q4HRS PRN    ondansetron  4 mg Q4HRS PRN    tizanidine  2 mg TID PRN    diazePAM  5 mg Q6HRS PRN    calcium carbonate  500 mg BID       Assessment and Plan:  POD# 2 s/p L3-4 TLIF ext of fusion  Chemical prophylactic DVT therapy: No  Start date/time: prefer to mobilize, can start 12/17/23 if necessary      Added Reglan for nausea. improved  Neuro exam stable, sundowning. Sitter qhs  Encouraged up to chair/activity during day, sleep aid prn. Encourage sleep wake cycle, d/w son  PT/OT, appreciate discharge recs. PMR consult placed  Anticipate discharge to SNF vs rehab when accepted

## 2023-12-16 NOTE — OP REPORT
DATE OF SERVICE:  12/15/2023     PREOPERATIVE DIAGNOSES:  1.  Status post lumbar laminectomy and fusion, L4-L5.  2.  Severe lumbar stenosis at L3-L4 with neurogenic claudication.  3.  Degenerative changes at L5-S1, mild to moderate.     POSTOPERATIVE DIAGNOSES:  1.  Status post lumbar laminectomy and fusion, L4-L5.  2.  Severe lumbar stenosis at L3-L4 with neurogenic claudication.  3.  Degenerative changes at L5-S1, mild to moderate.     PROCEDURES:  1.  Mazor, robotic-assisted placement of pedicle screws at L3, L4, L5   bilaterally, navigated with 6.5 x 45 mm screws placed at L3, 6.5 x 45 mm   screws placed at L4 and 6.5 x 45 mm screws placed at L5.  2.  Removal of hardware L4-L5 with replacement as stated above.  3.  Interbody fusion L3-L4 with use of 9 x 29.6 Catalyft cage, filled with   locally harvested bone graft and augmented anteriorly with OsteoAMP fibers.  4.  Posterolateral arthrodesis with the use of locally harvested bone graft,   OsteoAMP fibers and allograft prepared with bone marrow aspirate concentrate   for reduction of stem cells.  5.  Decompressive laminectomy L3-L4 with complete facetectomies.  6.  Posterolateral arthrodesis, facetectomies and bilateral foraminotomies.  7.  Instrumented stabilization L3 through L5 with use of Solera   instrumentation as placed above and UNiD patient-specific rods.     SURGEON:  Geovany Palafox M.D.     ASSISTANT:  LICO Moctezuma     ANESTHESIA:  General anesthetic.     ANESTHETIST:  Randy Johns M.D.     PREPARATION:  The patient had somatosensory evoked potentials, motor evokes,   and EMGs placed.     DESCRIPTION OF PROCEDURE:  The patient was brought to the operating room and   following induction, patient was intubated and placed under general   anesthetic.  Lopez catheter was sterilely placed and she was prepared for   somatosensory evoked potentials, motor evokes, and EMGs.  Rolled prone onto   the operating table using chest, hip, thigh pads,  all pressure points were   padded, sequential stockings were in place.  Back was prepped and draped in a   sterile fashion and timeout was performed.  We injected the incision with 30   mL of Marcaine 0.25% with epinephrine, made a midline incision and did a   subperiosteal dissection over L3 and over the instrumentation at L4-L5.  We   dissected over the transverse process of L3, and over the transverse process   of L4 and L5, decorticated this area and obtained meticulous hemostasis.  At   this time, we placed a posterior superior iliac Jamshidi needle on the right   and obtained 60 mL of blood.  With [employer], the bone marrow aspirate   was concentrated and the stem cells were removed and used to reconstitute 30   mL of allograft chips.  The Jamshidi needle had been removed and the stem cell   were being prepared.  At this time, we placed a PSIS pin and then attached to   robotic arm in the usual fashion.  Flyover with 3D rendering was obtained,   followed by the snapshot tracker to start navigation, followed by the AP and   oblique films taken with the target extender over the operative field and   subsequent merging of CT and fluoroscopic images.     With robotic arm and guidance, we were able to dock into the facet joints at   L3 bilaterally, drilled down the axis of the pedicle with the navigated Midas   Giorgio through the outer cannula, which was in the robotic arm.  Through this, we   drilled, tapped, and then placed the 6.5 x 45 mm navigated screws.  We had   excellent purchase, and at this time, we removed the set screws at L4 and L5,   followed by removal of the rosanna, followed by removal of the pedicle screws.    Solid fusion was here, but we were able to use navigation, to place a 6.5 x 45   mm screws replacing the Globus ClickX screws that were removed.  The bone   marrow aspirate had been mixed with the bone graft for about an hour and this   was put into the posterolateral gutters after mixing  this together and putting   it in the _____.  This was done at L3-L4 and L4-L5.     At this time, we removed the lamina of L3.  We drilled the junction of lamina   facet to a thin shelf, removed the center portion of the bone.  We drilled   through the pars interarticularis and superior facet with a Midas Giorgio using   the AM-8 and removed the inferior facet and subsequently the superior facet.    We completely decompressed around the pedicle of L3, and dissected the scar   tissue away from L4 and decompressed here.  Coming to the top of L5, we   decompressed around the L5, and did not find any stenosis inferiorly, so I did   not take this decompression inferiorly.     Drawing the thecal sac medially on the right hand side, got a small dural   opening, which was repaired with 8-0 Keasbey-Magnus as it was a very small pinhole.    Retracting thecal sac medially without any CSF leak, we opened the disk space   and then prepared the endplates with paddle rachael starting with a #7 and   moving to a #9.  Up and downbiting curettes were used to remove degenerative   disk material.  OsteoAMP fibers were placed anteriorly followed by the   Catalyft cage 9 x 29.6, tamped anteriorly under navigated guidance and was   opened obtaining good lordotic curvature and apposition to the endplates.  We   were able to pack into the cage through a funnel and the introducer of the   cage locally harvested bone graft from the [employer] bone press.     We placed approximately 10 mL here.     At this time, the introducer was removed.  Area was copiously irrigated.  No   CSF was found.  We placed a small piece of Duragen over the repair.  The UNiD   rods had been pre-prepared and were placed into the multiaxial heads.    Setscrews were secured.  We had good alignment on AP and lateral films and all   instrumentation was in good position.  We were especially happy with the   interbody cage, with this anterior apposition to cortical bone and again    correction of the flat back, with more lordosis obtained at this level.     We checked all nerve roots and found it to be free.  We obtained meticulous   hemostasis after irrigating and then placed 2 grams of vancomycin, a medium   size Hemovac.  Fascia was closed with #1 Vicryl suture, subcutaneous tissue   with 2-0 Vicryl, subcuticular layer and skin with staples.  All sponge and   needle counts correct.  Estimated blood loss was approximately 500 mL with 250   mL of Cell Saver given back.        ______________________________  MD ARTEMIO CHURCH/BIN/TMA    DD:  12/15/2023 17:04  DT:  12/15/2023 19:43    Job#:  [id number]

## 2023-12-16 NOTE — CARE PLAN
The patient is Stable - Low risk of patient condition declining or worsening    Shift Goals  Clinical Goals: safety, frequent re-orientation  Patient Goals: rest  Family Goals: no family present    Progress made toward(s) clinical / shift goals:        Problem: Pain - Standard  Goal: Alleviation of pain or a reduction in pain to the patient’s comfort goal  Outcome: Progressing       Problem: Fall Risk  Goal: Patient will remain free from falls  Outcome: Progressing       Patient is not progressing towards the following goals:

## 2023-12-16 NOTE — DISCHARGE PLANNING
[facility] Transitional Care Coordination    Referral from: DIONNE Mansfield   Insurance Provider on Facesheet: Medicare/A[employer]  Potential Rehab Diagnosis: Ortho    Chart review indicates patient may have on going medical management and may have therapy needs to possibly meet inpatient rehab facility criteria with the goal of returning to community.    D/C support: Lives alone, son locally     Physiatry consultation forwarded per protocol.     Physiatry to consult pending OT eval, TCC will follow.     Thank you for the referral.

## 2023-12-16 NOTE — DISCHARGE INSTRUCTIONS
Special Equipment    You are being discharged with the following special equipment:  LSO brace    Diet    Resume your normal diet as tolerated.  A diet low in cholesterol, fat, and sodium is recommended for good health.         Leave incision open to air.   OK to shower & pat dry starting 12/17/23  No soaking in hot tubs, baths, pools, etc.  Avoid repetitive bending, lifting over 10lbs, twisting. We encourage you to take frequent walks as tolerated  Do not drive or consume alcohol while taking narcotic pain medications. Do not take additional acetaminophen (tylenol) without consulting our office.   Do not take NSAIDs (such as ibuprofen or naproxen) or Aspirin unless you have been told otherwise by Dr Palafox's team  Jose to be removed 12/28  Follow up at [facility]. Call with questions or concerns @ [phone]

## 2023-12-16 NOTE — THERAPY
"Physical Therapy   Daily Treatment     Patient Name: Rin Yung  Age:  73 y.o., Sex:  female  Medical Record #: [id number]  Today's Date: 12/16/2023     Precautions  Precautions: Fall Risk;Spinal / Back Precautions ;Lumbosacral Orthosis  Comments: LSO when OOB>5min    Assessment    Pt received attempting to get out of bed and agreeable to PT session. Pt significantly confused during session and required max cues for maintaining precautions, use of FWW, not grabbing therapist, and safety. Pt required min to mod A overall to perform bed mobility, transfer, and ambulate. Pt demonstrated a posterior lean when standing in one place, requiring facilitation to maintain upright. Unclear what pt's baseline cognition is. Will continue to follow for acute PT to progress as able.    Plan    Treatment Plan Status: Continue Current Treatment Plan  Type of Treatment: Bed Mobility, Gait Training, Neuro Re-Education / Balance, Self Care / Home Evaluation, Therapeutic Activities, Therapeutic Exercise  Treatment Frequency: 5 Times per Week  Treatment Duration: Until Therapy Goals Met    DC Equipment Recommendations: Unable to determine at this time  Discharge Recommendations: Recommend post-acute placement for additional physical therapy services prior to discharge home    Subjective    \"Where are the wash cloths?\"     Objective       12/16/23 1415   Precautions   Precautions Fall Risk;Spinal / Back Precautions ;Lumbosacral Orthosis   Comments LSO when OOB>5min   Vitals   O2 (LPM) 2   O2 Delivery Device Nasal Cannula   Pain 0 - 10 Group   Therapist Pain Assessment Post Activity Pain Same as Prior to Activity;Nurse Notified  (did not report pain)   Cognition    Level of Consciousness Alert   Ability To Follow Commands 1 Step   Safety Awareness Impaired;Impulsive   New Learning Impaired   Attention Impaired   Comments Pt with continued confusion, repeated attempts to get out of bed per nursing. Difficulty with redirecting pt at " times during session and pt not consistent with command following. Pt also muttering things to herself that did not make sense. Unclear baseline   Balance   Sitting Balance (Static) Fair -   Sitting Balance (Dynamic) Fair -   Standing Balance (Static) Poor -   Standing Balance (Dynamic) Trace +   Weight Shift Sitting Poor   Weight Shift Standing Poor   Skilled Intervention Verbal Cuing;Compensatory Strategies;Facilitation   Comments Pt required consistent facilitation in standing due to posterior lean without knee buckling. With ambulation, she was able to maintain forward balance better and once in static standing she would lean back again.   Bed Mobility    Supine to Sit Minimal Assist   Sit to Supine Moderate Assist   Scooting Minimal Assist   Rolling Minimal Assist to Rt.   Skilled Intervention Verbal Cuing;Facilitation;Compensatory Strategies   Comments Required facilitation and cues for maintaining spinal precautions with bed mobility.   Gait Analysis   Gait Level Of Assist Minimal Assist   Assistive Device Front Wheel Walker   Distance (Feet) 50   # of Times Distance was Traveled 1   Deviation Shuffled Gait;Decreased Base Of Support;Decreased Toe Off;Decreased Heel Strike   Skilled Intervention Verbal Cuing;Tactile Cuing;Facilitation;Compensatory Strategies   Comments Pt impulsive, unsteady. Required assist with FWW management   Functional Mobility   Sit to Stand Minimal Assist   Bed, Chair, Wheelchair Transfer Minimal Assist   Transfer Method Stand Step   Mobility up with FWW   Comments posterior lean supporting self against the bed in standing   How much difficulty does the patient currently have...   Turning over in bed (including adjusting bedclothes, sheets and blankets)? 2   Sitting down on and standing up from a chair with arms (e.g., wheelchair, bedside commode, etc.) 1   Moving from lying on back to sitting on the side of the bed? 1   How much help from another person does the patient currently  need...   Moving to and from a bed to a chair (including a wheelchair)? 3   Need to walk in a hospital room? 3   Climbing 3-5 steps with a railing? 2   6 clicks Mobility Score 12   Short Term Goals    Short Term Goal # 1 pt will be able to complete supine<>sitting from flat bed with SPV in 6tx in order to improive independence   Goal Outcome # 1 goal not met   Short Term Goal # 2 pt will be able to complete functional transfers with FWW and SPV in 6tx in order to progress with therapy   Goal Outcome # 2 Goal not met   Short Term Goal # 3 pt will be able to ambulate 100ft with FWW and SPV in 6tx in order to decresae fall risk   Goal Outcome # 3 Goal not met   Physical Therapy Treatment Plan   Physical Therapy Treatment Plan Continue Current Treatment Plan   Anticipated Discharge Equipment and Recommendations   DC Equipment Recommendations Unable to determine at this time   Discharge Recommendations Recommend post-acute placement for additional physical therapy services prior to discharge home   Interdisciplinary Plan of Care Collaboration   IDT Collaboration with  Nursing;Occupational Therapist   Patient Position at End of Therapy In Bed;Bed Alarm On;Call Light within Reach  (tele sitter and RN present)   Collaboration Comments RN updated

## 2023-12-16 NOTE — THERAPY
Occupational Therapy   Initial Evaluation     Patient Name: Rin Yung  Age:  73 y.o., Sex:  female  Medical Record #: [id number]  Today's Date: 12/16/2023     Precautions  Precautions: Fall Risk, Spinal / Back Precautions , Lumbosacral Orthosis  Comments: LSO when OOB>5min    Assessment  Patient is 73 y.o. female s/p elective L3-S1 lami with L3-L4 fusion and dural repair. Per son, she apparently has been struggling at home for the past year with her cognition, and believes she may have undiagnosed dementia. She has been sundowning and getting agitated in the afternoons/evenings while admitted. She was confused throughout the session, difficult to understand due to nonsensical speech, had difficulty sequencing tasks, and had a difficult time retaining new information. Strong posterior lean during seated/standing activities. Additional factors influencing patient status / progress: weakness, fatigue, impaired balance, impaired cognition.      Plan    Occupational Therapy Initial Treatment Plan   Treatment Interventions: Self Care / Activities of Daily Living, Adaptive Equipment, Neuro Re-Education / Balance, Therapeutic Exercises, Therapeutic Activity  Treatment Frequency: 5 Times per Week  Duration: Until Therapy Goals Met    DC Equipment Recommendations: Unable to determine at this time  Discharge Recommendations: Recommend post-acute placement for additional occupational therapy services prior to discharge home      Objective       12/16/23 1358   Prior Living Situation   Comments Pt was unable to provide any PLOF/PLS due to AMS. Reportedly was living alone with a son that is local and supportive.   Prior Level of ADL Function   Self Feeding Independent   Grooming / Hygiene Independent   Bathing Independent   Dressing Independent   Toileting Independent   Precautions   Precautions Fall Risk;Spinal / Back Precautions ;Lumbosacral Orthosis   Comments LSO when OOB >5 min   Pain 0 - 10 Group   Therapist Pain  Assessment Nurse Notified;During Activity  (no c/o pain)   Cognition    Cognition / Consciousness X   Orientation Level Not Oriented to Time;Not Oriented to Day   Level of Consciousness Alert   Ability To Follow Commands 1 Step   Safety Awareness Impaired;Impulsive   New Learning Impaired   Attention Impaired   Comments pleasantly confused. fidgety and restless throughout. unable to understand what she was trying to say throughout the session as she was nonsensical at times. unclear what her baseline is.   Active ROM Upper Body   Active ROM Upper Body  WDL   Strength Upper Body   Upper Body Strength  WDL   Balance Assessment   Sitting Balance (Static) Fair -   Sitting Balance (Dynamic) Poor +   Standing Balance (Static) Poor -   Standing Balance (Dynamic) Trace +   Weight Shift Sitting Poor   Weight Shift Standing Poor   Comments w/ FWW; mild posterior lean while seated, strong posterior lean with standing activities   Bed Mobility    Supine to Sit Minimal Assist   Sit to Supine Moderate Assist   Scooting Minimal Assist   Rolling Minimal Assist to Rt.   Comments via log roll, max cueing for spinal precautions   ADL Assessment   Grooming Moderate Assist;Standing  (wash hands, brush teeth, brush hair; pt attempting to hold onto therapists hand containing the hair brush to brush her hair, essentially reverse [place] [employer])   Upper Body Dressing Maximal Assist  (don/doff LSO)   Lower Body Dressing Maximal Assist   Toileting   (guardado cath, declined need for BM)   Comments pt not retaining any spinal precautions education today   How much help from another person does the patient currently need...   Putting on and taking off regular lower body clothing? 2   Bathing (including washing, rinsing, and drying)? 2   Toileting, which includes using a toilet, bedpan, or urinal? 2   Putting on and taking off regular upper body clothing? 2   Taking care of personal grooming such as brushing teeth? 2   Eating meals? 3   6 Clicks Daily  Activity Score 13   Functional Mobility   Sit to Stand Minimal Assist   Bed, Chair, Wheelchair Transfer Minimal Assist   Mobility EOB>Sink>BTB   Comments w/ FWW   Patient / Family Goals   Patient / Family Goal #1 go home   Short Term Goals   Short Term Goal # 1 pt will demo ADL txfs with supv   Short Term Goal # 2 pt will groom at the sink w/ supv   Short Term Goal # 3 pt will don/doff LSO w/ supv   Short Term Goal # 4 pt will dress LB with supv and AE prn

## 2023-12-16 NOTE — PROGRESS NOTES
0655 Patient's in bed. Bedside report received from LINDA Regan RN at the beginning of the shift.    0853 Patient's in bed. Medicated with Zofran(see MAR)  for c/o's nausea, no emesis noted. Educated on the importance/use of IS at least 10x every hour while awake, able to reach 500. Fall protocol in effect. Call light within reach. Reminded patient to call for assist. Assessment completed. No distress noted. Able to answer questions ans follows commands but needs frequent re orientation. Has short term memory loss. Plan of care reviewed with the patient and son. Questions answered. Verbalized understanding. Tele sitter in use.     0935 SHOLA Guevara visited. POC discussed with the patient and son. Notified of patient's increased confusion last night and today.     1047 New order received and acknowledged from SHOLA Guevara Consult for Physiatry.    1155 Patient's in bed. Tried getting OOB. Tried pulling piv, pulse ox. Re oriented.    1310 Patient tried getting OOB. rE oriented.    1425 Juliet PT and Jaquelin OT worked with the patient.    1530 Safety sitter started for safety.    1732 Received a call from Alin (son). POC discussed, notified that patient has a safety sitter now. Questions answered. Verbalized understanding.    1935 Patient's in bed. Bedside report given to Ivan MCKEON RN (Nel).

## 2023-12-17 ENCOUNTER — APPOINTMENT (OUTPATIENT)
Dept: RADIOLOGY | Facility: MEDICAL CENTER | Age: 73
DRG: 460 | End: 2023-12-17
Attending: NURSE PRACTITIONER
Payer: MEDICARE

## 2023-12-17 PROCEDURE — 770001 HCHG ROOM/CARE - MED/SURG/GYN PRIV*

## 2023-12-17 PROCEDURE — A9270 NON-COVERED ITEM OR SERVICE: HCPCS | Performed by: NURSE PRACTITIONER

## 2023-12-17 PROCEDURE — 306015 LOCK STAT FOLEY: Performed by: NEUROLOGICAL SURGERY

## 2023-12-17 PROCEDURE — 71045 X-RAY EXAM CHEST 1 VIEW: CPT

## 2023-12-17 PROCEDURE — 700102 HCHG RX REV CODE 250 W/ 637 OVERRIDE(OP): Performed by: NURSE PRACTITIONER

## 2023-12-17 PROCEDURE — 700101 HCHG RX REV CODE 250: Performed by: NURSE PRACTITIONER

## 2023-12-17 RX ADMIN — ACETAMINOPHEN 650 MG: 325 TABLET, FILM COATED ORAL at 17:12

## 2023-12-17 RX ADMIN — AMLODIPINE BESYLATE 2.5 MG: 5 TABLET ORAL at 05:50

## 2023-12-17 RX ADMIN — ESCITALOPRAM OXALATE 10 MG: 10 TABLET ORAL at 05:50

## 2023-12-17 RX ADMIN — ACETAMINOPHEN 650 MG: 325 TABLET, FILM COATED ORAL at 05:51

## 2023-12-17 RX ADMIN — POTASSIUM CHLORIDE AND SODIUM CHLORIDE: 900; 150 INJECTION, SOLUTION INTRAVENOUS at 06:06

## 2023-12-17 RX ADMIN — DOCUSATE SODIUM 100 MG: 100 CAPSULE, LIQUID FILLED ORAL at 05:50

## 2023-12-17 RX ADMIN — POLYETHYLENE GLYCOL 3350 1 PACKET: 17 POWDER, FOR SOLUTION ORAL at 05:51

## 2023-12-17 RX ADMIN — DOCUSATE SODIUM 100 MG: 100 CAPSULE, LIQUID FILLED ORAL at 17:12

## 2023-12-17 RX ADMIN — METOPROLOL TARTRATE 25 MG: 25 TABLET, FILM COATED ORAL at 05:50

## 2023-12-17 RX ADMIN — METOPROLOL TARTRATE 25 MG: 25 TABLET, FILM COATED ORAL at 17:12

## 2023-12-17 RX ADMIN — DOCUSATE SODIUM 50 MG AND SENNOSIDES 8.6 MG 1 TABLET: 8.6; 5 TABLET, FILM COATED ORAL at 20:19

## 2023-12-17 RX ADMIN — ACETAMINOPHEN 650 MG: 325 TABLET, FILM COATED ORAL at 12:10

## 2023-12-17 RX ADMIN — LISINOPRIL 10 MG: 10 TABLET ORAL at 05:50

## 2023-12-17 RX ADMIN — DIAZEPAM 5 MG: 5 TABLET ORAL at 20:19

## 2023-12-17 ASSESSMENT — PAIN DESCRIPTION - PAIN TYPE
TYPE: SURGICAL PAIN

## 2023-12-17 NOTE — PROGRESS NOTES
Neurosurgery Progress Note    Subjective:  Anxiety > sundowing last night  Pain currently controlled  guardado    Exam:  AAO, cooperative, df/pf- 5/5, incision with drsg, hvac removed    BP  Min: 105/68  Max: 157/104  Pulse  Av.3  Min: 60  Max: 98  Resp  Av.3  Min: 16  Max: 20  Temp  Av.9 °C (98.5 °F)  Min: 36.5 °C (97.7 °F)  Max: 37.2 °C (99 °F)  SpO2  Av.3 %  Min: 91 %  Max: 94 %    No data recorded    Recent Labs     23  1140 12/15/23  0712 23  0123   WBC  --  8.3 9.6   RBC  --  3.51* 3.44*   HEMOGLOBIN 13.0 11.9* 11.6*   HEMATOCRIT 37.5 34.1* 33.4*   MCV  --  97.2 97.1   MCH  --  33.9* 33.7*   MCHC  --  34.9 34.7   RDW  --  43.6 42.5   PLATELETCT  --  234 236   MPV  --  9.3 9.8       Recent Labs     12/15/23  0712 23  0123   SODIUM 136 130*   POTASSIUM 4.0 3.7   CHLORIDE 102 99   CO2 27 23   GLUCOSE 112* 109*   BUN 11 12   CREATININE 0.40* 0.28*   CALCIUM 8.3* 8.5                 Intake/Output                         23 0700 - 23 0659 23 0700 - 23 0659      7552-1372 Total 5036-5913 2101-5461 Total                 Intake    P.O.  480  -- 480  --  -- --    P.O. 480 -- 480 -- -- --    I.V.  --  1200 1200  --  -- --    Volume (mL) (0.9 % NaCl with KCl 20 mEq infusion) -- 1200 1200 -- -- --    Total Intake 480 1200 1680 -- -- --       Output    Urine  850  900 1750  --  -- --    Output (mL) (Urethral Catheter Temperature probe 16 Fr.)  -- -- --    Total Output  -- -- --       Net I/O     -370 300 -70 -- -- --              Intake/Output Summary (Last 24 hours) at 2023 0930  Last data filed at 2023 0400  Gross per 24 hour   Intake 1440 ml   Output 1750 ml   Net -310 ml               oxyCODONE immediate-release  5-10 mg Q4HRS PRN    HYDROmorphone  0.5 mg Q3HRS PRN    metoclopramide  10 mg Q6HRS PRN    amLODIPine  2.5 mg DAILY    escitalopram  10 mg DAILY    lisinopril  10 mg DAILY    metoprolol tartrate  25 mg BID     Pharmacy Consult Request  1 Each PHARMACY TO DOSE    MD ALERT...DO NOT ADMINISTER NSAIDS or ASPIRIN unless ORDERED By Neurosurgery  1 Each PRN    docusate sodium  100 mg BID    senna-docusate  1 Tablet Nightly    senna-docusate  1 Tablet Q24HRS PRN    polyethylene glycol/lytes  1 Packet BID PRN    magnesium hydroxide  30 mL QDAY PRN    bisacodyl  10 mg Q24HRS PRN    sodium phosphate  1 Each Once PRN    0.9 % NaCl with KCl 20 mEq 1,000 mL   Continuous    acetaminophen  650 mg Q6HRS    Followed by    [START ON 12/19/2023] acetaminophen  650 mg Q6HRS PRN    diphenhydrAMINE  25 mg Q6HRS PRN    Or    diphenhydrAMINE  25 mg Q6HRS PRN    ondansetron  4 mg Q4HRS PRN    ondansetron  4 mg Q4HRS PRN    tizanidine  2 mg TID PRN    diazePAM  5 mg Q6HRS PRN    calcium carbonate  500 mg BID       Assessment and Plan:  POD# 3 s/p L3-4 TLIF ext of fusion  Chemical prophylactic DVT therapy: No  Start date/time: prefer to mobilize, can start 12/17/23 if necessary      Neuro exam stable, sundowning. Sitter qhs  Encouraged up to chair/activity during day, sleep aid prn. Encourage sleep wake cycle, d/w son yesterday  PT/OT, appreciate discharge recs. PMR consult placed  Anticipate discharge to SNF vs rehab when accepted    Outpatient neurology referral placed for dementia workup

## 2023-12-17 NOTE — CARE PLAN
The patient is Stable - Low risk of patient condition declining or worsening    Shift Goals  Clinical Goals: pain control, safety, therapy, breathing  Patient Goals: go home  Family Goals: dc planning    Progress made toward(s) clinical / shift goals: Pain controlled, patient safety sitter required for impulsivity falls risk, breathing improved with oxygen.     Patient is not progressing towards the following goals:

## 2023-12-17 NOTE — CARE PLAN
The patient is Stable - Low risk of patient condition declining or worsening    Shift Goals  Clinical Goals: pain control, safety, mobility  Patient Goals: pain control, comfort  Family Goals: NA    Progress made toward(s) clinical / shift goals:  yes  Problem: Pain - Standard  Goal: Alleviation of pain or a reduction in pain to the patient’s comfort goal  Outcome: Progressing     Problem: Skin Integrity  Goal: Skin integrity is maintained or improved  Outcome: Progressing     Problem: Fall Risk  Goal: Patient will remain free from falls  Outcome: Progressing       Patient is not progressing towards the following goals:

## 2023-12-17 NOTE — PROGRESS NOTES
Patient confused. Patient stated little pain but no needs pain med for now.   Meds administered per MAR.  Sitter at the bedside for safety.

## 2023-12-17 NOTE — DISCHARGE PLANNING
Case Management Discharge Planning    Admission Date: 12/14/2023  GMLOS: 2.9  ALOS: 2    6-Clicks ADL Score: 13  6-Clicks Mobility Score: 12  PT and/or OT Eval ordered: Yes  Post-acute Referrals Ordered: Yes  Post-acute Choice Obtained: No  Has referral(s) been sent to post-acute provider:  Yes      Anticipated Discharge Dispo: Discharge Disposition: D/T to SNF with Medicare cert in anticipation of skilled care (03)    DME Needed: No    Action(s) Taken: DC Assessment Complete (See below)    Escalations Completed: None    Medically Clear: No    Next Steps: HCM will continue to follow    Barriers to Discharge: None    Is the patient up for discharge tomorrow: No      Care Transition Team Assessment    Pt A&Ox1. Called pt's son, Alin to complete assessment. Pt states pt lives alone. Prior to admission pt was not using any DME, was very active riding her bike and driving. Pt so far been able to manage alone but he's concerned she will eventually need some care due to her declining cognition. He lives in [place] and speaks with her daily. They visit about every two weeks. Pt has no other children here.     Discussed recommendation for SNF. Alin is in agreement and is open to a SNF in [place] or [place]. They will choose out of accepting SNF's. Rehab consult also pending    PASRR submitted [id number]    Information Source  Orientation Level: Disoriented to situation, Disoriented to time, Disoriented to place  Information Given By: Patient         Elopement Risk  Legal Hold: No  Ambulatory or Self Mobile in Wheelchair: No-Not an Elopement Risk  Elopement Risk: Not at Risk for Elopement    Interdisciplinary Discharge Planning  Lives with - Patient's Self Care Capacity: Alone and Unable to Care For Self  Patient or legal guardian wants to designate a caregiver: No  Support Systems: Children, Family Member(s), Friends / Neighbors  Housing / Facility: 1 Story House  Name of Care Facility: [facility]  Prior Services:  Home-Independent  Durable Medical Equipment: Not Applicable  DME Provider / Phone: brie    Discharge Preparedness  What is your plan after discharge?: Skilled nursing facility  What are your discharge supports?: Child  Prior Functional Level: Ambulatory, Independent with Activities of Daily Living, Independent with Medication Management, Drives Self    Functional Assesment  Prior Functional Level: Ambulatory, Independent with Activities of Daily Living, Independent with Medication Management, Drives Self    Finances  Financial Barriers to Discharge: No  Prescription Coverage: Yes    Vision / Hearing Impairment  Vision Impairment : Yes  Right Eye Vision: Impaired, Wears Glasses  Left Eye Vision: Impaired, Wears Glasses  Hearing Impairment : No              Domestic Abuse  Have you ever been the victim of abuse or violence?: No  Physical Abuse or Sexual Abuse: No  Verbal Abuse or Emotional Abuse: No  Possible Abuse/Neglect Reported to:: Not Applicable    Psychological Assessment  History of Substance Abuse: None  History of Psychiatric Problems: No  Non-compliant with Treatment: No  Newly Diagnosed Illness: Yes    Discharge Risks or Barriers  Discharge risks or barriers?: No    Anticipated Discharge Information  Discharge Disposition: D/T to SNF with Medicare cert in anticipation of skilled care (03)

## 2023-12-17 NOTE — PROGRESS NOTES
0050 Patient is very anxious but she stated she does not want to take anxiety med. Also she refused scheduled pain med.  Patient talked to her son on the phone.  Patient took statlock off, replaced it.

## 2023-12-17 NOTE — PROGRESS NOTES
Patient only pulling 500ml max on IS, tachypnea at 22, using 2L NC oxygent with saturation of 92% diminished lung sounds.  Spoke with Rob BARFIELD requests chest XR portable and increase mobility as tolerated.

## 2023-12-18 PROCEDURE — 700102 HCHG RX REV CODE 250 W/ 637 OVERRIDE(OP): Performed by: NURSE PRACTITIONER

## 2023-12-18 PROCEDURE — 97530 THERAPEUTIC ACTIVITIES: CPT

## 2023-12-18 PROCEDURE — A9270 NON-COVERED ITEM OR SERVICE: HCPCS | Performed by: NURSE PRACTITIONER

## 2023-12-18 PROCEDURE — 770001 HCHG ROOM/CARE - MED/SURG/GYN PRIV*

## 2023-12-18 PROCEDURE — 97535 SELF CARE MNGMENT TRAINING: CPT

## 2023-12-18 RX ORDER — METOCLOPRAMIDE 10 MG/1
10 TABLET ORAL EVERY 6 HOURS PRN
Status: DISCONTINUED | OUTPATIENT
Start: 2023-12-18 | End: 2023-12-19 | Stop reason: HOSPADM

## 2023-12-18 RX ADMIN — ACETAMINOPHEN 650 MG: 325 TABLET, FILM COATED ORAL at 11:23

## 2023-12-18 RX ADMIN — MAGNESIUM HYDROXIDE 30 ML: 1200 LIQUID ORAL at 15:00

## 2023-12-18 RX ADMIN — DOCUSATE SODIUM 100 MG: 100 CAPSULE, LIQUID FILLED ORAL at 05:04

## 2023-12-18 RX ADMIN — LISINOPRIL 10 MG: 10 TABLET ORAL at 05:04

## 2023-12-18 RX ADMIN — ANTACID TABLETS 500 MG: 500 TABLET, CHEWABLE ORAL at 16:42

## 2023-12-18 RX ADMIN — ESCITALOPRAM OXALATE 10 MG: 10 TABLET ORAL at 05:03

## 2023-12-18 RX ADMIN — ACETAMINOPHEN 650 MG: 325 TABLET, FILM COATED ORAL at 16:42

## 2023-12-18 RX ADMIN — METOPROLOL TARTRATE 25 MG: 25 TABLET, FILM COATED ORAL at 05:03

## 2023-12-18 RX ADMIN — ACETAMINOPHEN 650 MG: 325 TABLET, FILM COATED ORAL at 23:05

## 2023-12-18 RX ADMIN — METOPROLOL TARTRATE 25 MG: 25 TABLET, FILM COATED ORAL at 16:41

## 2023-12-18 RX ADMIN — AMLODIPINE BESYLATE 2.5 MG: 5 TABLET ORAL at 05:04

## 2023-12-18 RX ADMIN — DOCUSATE SODIUM 100 MG: 100 CAPSULE, LIQUID FILLED ORAL at 16:41

## 2023-12-18 RX ADMIN — ACETAMINOPHEN 650 MG: 325 TABLET, FILM COATED ORAL at 05:05

## 2023-12-18 ASSESSMENT — COGNITIVE AND FUNCTIONAL STATUS - GENERAL
DRESSING REGULAR UPPER BODY CLOTHING: A LOT
SUGGESTED CMS G CODE MODIFIER MOBILITY: CL
EATING MEALS: A LITTLE
TURNING FROM BACK TO SIDE WHILE IN FLAT BAD: A LOT
DRESSING REGULAR LOWER BODY CLOTHING: A LOT
PERSONAL GROOMING: A LOT
MOBILITY SCORE: 14
WALKING IN HOSPITAL ROOM: A LITTLE
MOVING TO AND FROM BED TO CHAIR: A LOT
STANDING UP FROM CHAIR USING ARMS: A LITTLE
MOVING FROM LYING ON BACK TO SITTING ON SIDE OF FLAT BED: A LOT
SUGGESTED CMS G CODE MODIFIER DAILY ACTIVITY: CL
HELP NEEDED FOR BATHING: A LOT
DAILY ACTIVITIY SCORE: 13
CLIMB 3 TO 5 STEPS WITH RAILING: A LOT
TOILETING: A LOT

## 2023-12-18 ASSESSMENT — PAIN DESCRIPTION - PAIN TYPE
TYPE: ACUTE PAIN;SURGICAL PAIN
TYPE: ACUTE PAIN;SURGICAL PAIN

## 2023-12-18 ASSESSMENT — GAIT ASSESSMENTS
DEVIATION: DECREASED BASE OF SUPPORT;BRADYKINETIC;SHUFFLED GAIT
GAIT LEVEL OF ASSIST: MINIMAL ASSIST
ASSISTIVE DEVICE: FRONT WHEEL WALKER
DISTANCE (FEET): 300

## 2023-12-18 NOTE — CARE PLAN
The patient is Stable - Low risk of patient condition declining or worsening    Shift Goals  Clinical Goals: pain  control, safety  Patient Goals: D/C planning  Family Goals: NA    Progress made toward(s) clinical / shift goals:  yes  Problem: Fall Risk  Goal: Patient will remain free from falls  Outcome: Progressing       Patient is not progressing towards the following goals:

## 2023-12-18 NOTE — PROGRESS NOTES
Neurosurgery Progress Note    Subjective:  Pt sitting in chair  Pain currently controlled, denies leg pain  Lopez remains, -bm    Exam:  AAO, cooperative, df/pf- 5/5, incision with drsg- brace on    BP  Min: 138/99  Max: 170/100  Pulse  Av.7  Min: 70  Max: 115  Resp  Av  Min: 16  Max: 20  Temp  Av °C (98.6 °F)  Min: 36.8 °C (98.2 °F)  Max: 37.2 °C (99 °F)  Monitored Temp 2  Av.1 °C (98.7 °F)  Min: 37 °C (98.6 °F)  Max: 37.1 °C (98.8 °F)  SpO2  Av.3 %  Min: 90 %  Max: 93 %    No data recorded    Recent Labs     23  0123   WBC 9.6   RBC 3.44*   HEMOGLOBIN 11.6*   HEMATOCRIT 33.4*   MCV 97.1   MCH 33.7*   MCHC 34.7   RDW 42.5   PLATELETCT 236   MPV 9.8       Recent Labs     23  0123   SODIUM 130*   POTASSIUM 3.7   CHLORIDE 99   CO2 23   GLUCOSE 109*   BUN 12   CREATININE 0.28*   CALCIUM 8.5                 Intake/Output                         23 0700 - 23 0659 23 0700 - 23 0659     6619-8549 1117-6358 Total 3904-5137 2928-4552 Total                 Intake    P.O.  240  120 360  --  -- --    P.O. 240 120 360 -- -- --    Total Intake 240 120 360 -- -- --       Output    Urine  1600  3500 5100  --  -- --    Output (mL) (Urethral Catheter Temperature probe 16 Fr.) 1600 3500 5100 -- -- --    Emesis  0  -- 0  --  -- --    Emesis 0 -- 0 -- -- --    Stool  --  -- --  --  -- --    Number of Times Stooled 0 x -- 0 x 0 x -- 0 x    Total Output 1600 3500 5100 -- -- --       Net I/O     -3976 -3324 -3152 -- -- --              Intake/Output Summary (Last 24 hours) at 2023 0916  Last data filed at 2023 0500  Gross per 24 hour   Intake 360 ml   Output 5100 ml   Net -4740 ml               oxyCODONE immediate-release  5-10 mg Q4HRS PRN    HYDROmorphone  0.5 mg Q3HRS PRN    metoclopramide  10 mg Q6HRS PRN    amLODIPine  2.5 mg DAILY    escitalopram  10 mg DAILY    lisinopril  10 mg DAILY    metoprolol tartrate  25 mg BID    Pharmacy Consult Request  1 Each PHARMACY  TO DOSE    MD ALERT...DO NOT ADMINISTER NSAIDS or ASPIRIN unless ORDERED By Neurosurgery  1 Each PRN    docusate sodium  100 mg BID    senna-docusate  1 Tablet Nightly    senna-docusate  1 Tablet Q24HRS PRN    polyethylene glycol/lytes  1 Packet BID PRN    magnesium hydroxide  30 mL QDAY PRN    bisacodyl  10 mg Q24HRS PRN    sodium phosphate  1 Each Once PRN    0.9 % NaCl with KCl 20 mEq 1,000 mL   Continuous    acetaminophen  650 mg Q6HRS    Followed by    [START ON 12/19/2023] acetaminophen  650 mg Q6HRS PRN    diphenhydrAMINE  25 mg Q6HRS PRN    Or    diphenhydrAMINE  25 mg Q6HRS PRN    ondansetron  4 mg Q4HRS PRN    ondansetron  4 mg Q4HRS PRN    tizanidine  2 mg TID PRN    diazePAM  5 mg Q6HRS PRN    calcium carbonate  500 mg BID       Assessment and Plan:  POD# 4 s/p L3-4 TLIF ext of fusion  Chemical prophylactic DVT therapy: No  Start date/time: n/a      Neuro exam stable, sundowning. Sitter qhs  Encouraged up to chair/activity during day, sleep aid prn. Encourage sleep wake cycle, d/w son this weekend  PT/OT, appreciate discharge recs. PMR consult placed  Anticipate discharge to SNF vs rehab when accepted  Bowel protocol  Dc guardado- bladder protocol  Continue pain control  Clear to transfer when accepted    Outpatient neurology referral placed for dementia workup

## 2023-12-18 NOTE — DISCHARGE PLANNING
Patient accepted by [facility].Transportation confirmed for [facility] wheelchair transport at 1230 as set up by IRF.   Patient's son, sharri, notified of expected transportation. IDT notified.   Awaiting transportation tomorrow 12/19/2023.      Case Management Discharge Planning    Admission Date: 12/14/2023  GMLOS: 2.8  ALOS: 4    6-Clicks ADL Score: 13  6-Clicks Mobility Score: 12  PT and/or OT Eval ordered: Yes  Post-acute Referrals Ordered: Yes  Post-acute Choice Obtained: Yes  Has referral(s) been sent to post-acute provider:  Yes      Anticipated Discharge Dispo: Discharge Disposition: D/T to SNF with Medicare cert in anticipation of skilled care (03)    DME Needed: No    Action(s) Taken: Updated Provider/Nurse on Discharge Plan and Family Conference    Escalations Completed: None    Medically Clear: Yes    Next Steps: No further needs     Barriers to Discharge: None    Is the patient up for discharge tomorrow: Yes    Is transport arranged for discharge disposition: Yes

## 2023-12-18 NOTE — PROGRESS NOTES
Patient answers questions but she is confused in conversation. Patient stated she does not need pain medication at this time.   Lopez in place  Sitter at the bedside. Call light within reach.

## 2023-12-18 NOTE — CARE PLAN
Problem: Knowledge Deficit - Standard  Goal: Patient and family/care givers will demonstrate understanding of plan of care, disease process/condition, diagnostic tests and medications  Outcome: Progressing  Note: Spine precaution, safety, PT/OT     Problem: Fall Risk  Goal: Patient will remain free from falls  Outcome: Progressing  Note: Safety sitter   The patient is Watcher - Medium risk of patient condition declining or worsening    Shift Goals  Clinical Goals: pain  control, safety  Patient Goals: D/C planning  Family Goals: NA    Progress made toward(s) clinical / shift goals:  ambulate, placement    Patient is not progressing towards the following goals:

## 2023-12-18 NOTE — CONSULTS
Physical Medicine & Rehabilitation Chart Review      Please note that this is a chart review.    History of Present Illness:  Patient is 73 y.o. female s/p elective L3-S1 lami with L3-L4 fusion and dural repair. Per son, she apparently has been struggling at home for the past year with her cognition, and believes she may have undiagnosed dementia. She has been sundowning and getting agitated in the afternoons/evenings while admitted. She was confused throughout the session, difficult to understand due to nonsensical speech, had difficulty sequencing tasks, and had a difficult time retaining new information. Strong posterior lean during seated/standing activities. Additional factors influencing patient status / progress: weakness, fatigue, impaired balance, impaired cognition.           Past Medical History:  Past Medical History:   Diagnosis Date    Anesthesia 11/20/2023    PONV/ pt with history of motion sickness    Arthritis 11/20/2023    low back    Breath shortness 11/20/2023    with exertion    Cancer (HCC) 11/20/2023    basal cell nose    Cataract 11/20/2023    IOL (can't remeber which eye)    Hernia, inguinal, bilateral     Hypertension 11/20/2023    medicated    Pain 11/20/2023    low back pain with left leg involvement    Pneumonia 11/20/2023    history of    PONV (postoperative nausea and vomiting) 11/20/2023    pt with history of motion sickness    Psychiatric problem 11/20/2023    medicated    Urinary bladder disorder 11/20/2023    wears pads for incontinence    Urinary incontinence 11/20/2023    wears pads    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       Past Surgical History:  Past Surgical History:   Procedure Laterality Date    FUSION, SPINE, LUMBAR, ROBOT-ASSISTED WITH IMAGING GUIDANCE N/A 12/14/2023    Procedure: ROBOT ASSISTED POSTERIOR L3-4, L5-S1 LAMINECTOMIES WITH A L3-4 EXTENSION OF FUSION WITH INTERBODY, DURAL TEAR REPAIR;  Surgeon: Geovany Palafox M.D.;  Location:  SURGERY [facility];  Service: Neuro Robotic    LUMBAR LAMINECTOMY DISKECTOMY N/A 12/14/2023    Procedure: LAMINECTOMY, SPINE, LUMBAR, WITH DISCECTOMY;  Surgeon: Geovany Palafox M.D.;  Location: SURGERY [facility];  Service: Neuro Robotic    OTHER NEUROLOGICAL SURG  11/20/2023    back fusion L4-5    OTHER  11/20/2023    basal cell removal, nose    OTHER  11/20/2023    IOL 2021    OTHER ABDOMINAL SURGERY  11/20/2023    intestinal obstruction removal    OTHER ABDOMINAL SURGERY  11/20/2023    appendectomy 2008    OTHER ORTHOPEDIC SURGERY Right 11/20/2023    shoulder rotatory cuff repair    OTHER ABDOMINAL SURGERY  11/20/2023    hemorrhoidectomy    OTHER  11/20/2023    cosmetic facial surgery 2010    GYN SURGERY  11/20/2023    hysterectomy with bladder sling 1998    OTHER ABDOMINAL SURGERY  11/20/2023    colonoscopies    RHYTIDECTOMY  12/19/2012    Performed by Christiano Greco M.D. at [facility]    PLATYSMAPLASTY  12/19/2012    Performed by Christiano Greco M.D. at [facility]    FAT TRANSFER  12/19/2012    Performed by Christiano Greco M.D. at [facility]       Family History:  History reviewed. No pertinent family history.    Medications:  Scheduled Medications   Medication Dose Frequency    amLODIPine  2.5 mg DAILY    escitalopram  10 mg DAILY    lisinopril  10 mg DAILY    metoprolol tartrate  25 mg BID    Pharmacy Consult Request  1 Each PHARMACY TO DOSE    docusate sodium  100 mg BID    senna-docusate  1 Tablet Nightly    acetaminophen  650 mg Q6HRS    calcium carbonate  500 mg BID     PRN medications: metoclopramide, oxyCODONE immediate-release, HYDROmorphone, MD ALERT...DO NOT ADMINISTER NSAIDS or ASPIRIN unless ORDERED By Neurosurgery, senna-docusate, polyethylene glycol/lytes, magnesium hydroxide, bisacodyl, sodium phosphate, acetaminophen **FOLLOWED BY** [START ON 12/19/2023] acetaminophen, diphenhydrAMINE **OR** diphenhydrAMINE, ondansetron, ondansetron, tizanidine,  diazePAM    Allergies:  Patient has no known allergies.    Assessment & Plan:  The patient presents functional deficits in mobility and self-care as well as cognitive deficits and swallowing/speech, and Moderate  de-conditioning. Patient was previously Independent using None living with Alone and Unable to Care For Self in a 1 Story House with 1 step(s) to enter. Per most recent PT note they are Minimal Assist for mobility with Front Wheel Walker and Minimal Assist for transfers.  Per most recent OT note they are Maximal Assist at LB dressing,  (guardado cath, declined need for BM)    The patient's current diet is:  Current Diet Order   Procedures    Diet Order Diet: Regular       The patient is a Good candidate for an acute inpatient rehabilitation program with a coordinated program of care at an intensity and frequency not available at a lower level of care.     Note: This recommendation requires that patient has at least CGA/Minimal Assistance needs in at least two therapy disciplines.  If patient progresses to no longer need CGA/Theresa with at least two therapy disciplines they may be more appropriate for Skilled nursing facility versus home with home health.      This recommendation is substantiated by the patient's current medical condition with intervention and assessment of medical issues requiring an acute level of care for patient's safety and maximum outcome. A coordinated program of care will be provided by an interdisciplinary team including physical therapy, occupational therapy, and speech language pathology. Rehab goals include improved cognition, mobility, self-care management, strength and conditioning/endurance, pain management, bowel and bladder management, mood and affect, and safety with independent home management including caregiver training. Estimated length of stay is approximately 10-14 days days. Rehab potential: Good. Disposition: to pre-morbid independent living setting with supportive care  of patient's family. We will continue to follow with you in anticipation of discharge to acute inpatient rehabilitation when medically stable to do so at the discretion of the attending physician. Thank you for allowing us to participate in this patient's care. Please call with any questions regarding this recommendation.    ____________________________________    [facility]  ____________________________________

## 2023-12-18 NOTE — DISCHARGE PLANNING
Chart review by Dr. Mcclain.  Patient is a candidate for IPR.  Will need posey bed on admission.  GMT to transport 12/19/2023 at 1230 WC.  Care team notified.  Bedside RN to call [phone] for report

## 2023-12-18 NOTE — DISCHARGE PLANNING
Spoke with son Alin,  Upon discharge Rin will have 24/7 between Alin, his brother and his aunt.  They are preparing to rotate care between the three of them.  Rin live is a single story home with 2 NAYANA.  Discussed visiting, family training, patient being in patient through the holidays.  Alin is in agreement with family training and admission.

## 2023-12-18 NOTE — PREADMISSION SCREENING NOTE
Pre-Admission Screening Form    Patient Information:   Name: Rin Yung     MRN: [id number]       : 1950      Age: 73 y.o.   Gender: female      Race: White [7]       Marital Status:  [4]  Family Contact: Alin Yung        Relationship: Son [15]  Home Phone:            Cell Phone: [phone]  Advanced Directives: None  Code Status:  FULL  Current Attending Provider: Geovany Palafox M.D.  Referring Physician: Donal TRUONG      Physiatrist Consult: Dr. Mcclain       Referral Date: 2023  Primary Payor Source:  MEDICARE  Secondary Payor Source:  [facility]    Medical Information:   Date of Admission to Acute Care [place]2023  Room Number: T335/02  Rehabilitation Diagnosis: 0008.9 - Orthopaedic Disorders: Other Orthopaedic  Immunization History   Administered Date(s) Administered    Hep A/HEP B Combined Vaccine (TwinRix) 2020, 2020, 2020, 2020    Hepatitis A Vaccine, Adult 2020    Hepatitis B Vaccine (Adol/Adult) 2020    Influenza Seasonal Injectable - Historical Data 2020, 2021    Influenza Vac Subunit Quad Inj (Pf) 2018    Influenza Vaccine Adult HD 2018, 2021    Influenza Vaccine Quad Inj (Pf) 2020    Influenza Vaccine Quad Inj (Preserved) 2019    Influenza Vaccine Quad Recombinant 2022    MODERNA BIVALENT BOOSTER SARS-COV-2 VACCINE (6+) 2022    MODERNA SARS-COV-2 VACCINE (12+) 2021, 2021, 2021, 2022    Pneumococcal Conjugate Vaccine (PCV20) 2022    Pneumococcal Conjugate Vaccine (Prevnar/PCV-13) 2019    Tdap Vaccine 2020    Zoster Vaccine Recombinant (RZV) (SHINGRIX) 2020, 2020     No Known Allergies  Past Medical History:   Diagnosis Date    Anesthesia 2023    PONV/ pt with history of motion sickness    Arthritis 2023    low back    Breath shortness 2023    with exertion    Cancer (HCC) 2023    basal cell nose    Cataract  11/20/2023    IOL (can't remeber which eye)    Hernia, inguinal, bilateral     Hypertension 11/20/2023    medicated    Pain 11/20/2023    low back pain with left leg involvement    Pneumonia 11/20/2023    history of    PONV (postoperative nausea and vomiting) 11/20/2023    pt with history of motion sickness    Psychiatric problem 11/20/2023    medicated    Urinary bladder disorder 11/20/2023    wears pads for incontinence    Urinary incontinence 11/20/2023    wears pads    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     Past Surgical History:   Procedure Laterality Date    FUSION, SPINE, LUMBAR, ROBOT-ASSISTED WITH IMAGING GUIDANCE N/A 12/14/2023    Procedure: ROBOT ASSISTED POSTERIOR L3-4, L5-S1 LAMINECTOMIES WITH A L3-4 EXTENSION OF FUSION WITH INTERBODY, DURAL TEAR REPAIR;  Surgeon: Geovany Palafox M.D.;  Location: SURGERY [facility];  Service: Neuro Robotic    LUMBAR LAMINECTOMY DISKECTOMY N/A 12/14/2023    Procedure: LAMINECTOMY, SPINE, LUMBAR, WITH DISCECTOMY;  Surgeon: Geovany Palafox M.D.;  Location: SURGERY [facility];  Service: Neuro Robotic    OTHER NEUROLOGICAL SURG  11/20/2023    back fusion L4-5    OTHER  11/20/2023    basal cell removal, nose    OTHER  11/20/2023    IOL 2021    OTHER ABDOMINAL SURGERY  11/20/2023    intestinal obstruction removal    OTHER ABDOMINAL SURGERY  11/20/2023    appendectomy 2008    OTHER ORTHOPEDIC SURGERY Right 11/20/2023    shoulder rotatory cuff repair    OTHER ABDOMINAL SURGERY  11/20/2023    hemorrhoidectomy    OTHER  11/20/2023    cosmetic facial surgery 2010    GYN SURGERY  11/20/2023    hysterectomy with bladder sling 1998    OTHER ABDOMINAL SURGERY  11/20/2023    colonoscopies    RHYTIDECTOMY  12/19/2012    Performed by Christiano Greco M.D. at SURGERY [facility]S    PLATYSMAPLASTY  12/19/2012    Performed by Christiano Greco M.D. at SURGERY SUR[facility]S    FAT TRANSFER  12/19/2012    Performed by Christiano Greco M.D. at SURGERY  [facility]       History Leading to Admission, Conditions that Caused the Need for Rehab (CMS): Posterior L3-4, L5-S1 Lami with a L3-4 extension of fusion, spinal stenosis HTN  Co-morbidities:  As listed above and below  Potential Risk - Complications: Cognitive Impairment, Contractures, Deep Vein Thrombosis, Incontinence, Malnutrition, Pain, Paralysis, Perceptual Impairment, and Pneumonia  Level of Risk: High    Ongoing Medical Management Needed (Medical/Nursing Needs):   Patient Active Problem List    Diagnosis Date Noted    PONV (postoperative nausea and vomiting) 12/14/2023    HTN (hypertension) 12/14/2023    Lumbar stenosis without neurogenic claudication 12/14/2023             Please note that this is a chart review.     History of Present Illness:  Patient is 73 y.o. female s/p elective L3-S1 lami with L3-L4 fusion and dural repair. Per son, she apparently has been struggling at home for the past year with her cognition, and believes she may have undiagnosed dementia. She has been sundowning and getting agitated in the afternoons/evenings while admitted. She was confused throughout the session, difficult to understand due to nonsensical speech, had difficulty sequencing tasks, and had a difficult time retaining new information. Strong posterior lean during seated/standing activities. Additional factors influencing patient status / progress: weakness, fatigue, impaired balance, impaired cognition.             Past Medical History:  Past Medical History  Past Medical History:  Diagnosis Date   Anesthesia 11/20/2023    PONV/ pt with history of motion sickness   Arthritis 11/20/2023    low back   Breath shortness 11/20/2023    with exertion   Cancer (HCC) 11/20/2023    basal cell nose   Cataract 11/20/2023    IOL (can't remeber which eye)   Hernia, inguinal, bilateral     Hypertension 11/20/2023    medicated   Pain 11/20/2023    low back pain with left leg involvement   Pneumonia 11/20/2023    history  of   PONV (postoperative nausea and vomiting) 11/20/2023    pt with history of motion sickness   Psychiatric problem 11/20/2023    medicated   Urinary bladder disorder 11/20/2023    wears pads for incontinence   Urinary incontinence 11/20/2023    wears pads   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          Past Surgical History:  Past Surgical History  Past Surgical History:  Procedure Laterality Date   FUSION, SPINE, LUMBAR, ROBOT-ASSISTED WITH IMAGING GUIDANCE N/A 12/14/2023    Procedure: ROBOT ASSISTED POSTERIOR L3-4, L5-S1 LAMINECTOMIES WITH A L3-4 EXTENSION OF FUSION WITH INTERBODY, DURAL TEAR REPAIR;  Surgeon: Geovany Palafox M.D.;  Location: SURGERY [facility];  Service: Neuro Robotic   LUMBAR LAMINECTOMY DISKECTOMY N/A 12/14/2023    Procedure: LAMINECTOMY, SPINE, LUMBAR, WITH DISCECTOMY;  Surgeon: Geovany Palafox M.D.;  Location: SURGERY [facility];  Service: Neuro Robotic   OTHER NEUROLOGICAL SURG   11/20/2023    back fusion L4-5   OTHER   11/20/2023    basal cell removal, nose   OTHER   11/20/2023    IOL 2021   OTHER ABDOMINAL SURGERY   11/20/2023    intestinal obstruction removal   OTHER ABDOMINAL SURGERY   11/20/2023    appendectomy 2008   OTHER ORTHOPEDIC SURGERY Right 11/20/2023    shoulder rotatory cuff repair   OTHER ABDOMINAL SURGERY   11/20/2023    hemorrhoidectomy   OTHER   11/20/2023    cosmetic facial surgery 2010   GYN SURGERY   11/20/2023    hysterectomy with bladder sling 1998   OTHER ABDOMINAL SURGERY   11/20/2023    colonoscopies   RHYTIDECTOMY   12/19/2012    Performed by Christiano Greco M.D. at [facility] ORS   PLATYSMAPLASTY   12/19/2012    Performed by Christiano Greco M.D. at [facility] ORS   FAT TRANSFER   12/19/2012    Performed by Christiano Greco M.D. at [facility] ORS          Family History:  Family History  History reviewed. No pertinent family history.       Medications:  Scheduled  Medications  Medication Dose Frequency   amLODIPine  2.5 mg DAILY   escitalopram  10 mg DAILY   lisinopril  10 mg DAILY   metoprolol tartrate  25 mg BID   Pharmacy Consult Request  1 Each PHARMACY TO DOSE   docusate sodium  100 mg BID   senna-docusate  1 Tablet Nightly   acetaminophen  650 mg Q6HRS   calcium carbonate  500 mg BID     PRN medications: metoclopramide, oxyCODONE immediate-release, HYDROmorphone, MD ALERT...DO NOT ADMINISTER NSAIDS or ASPIRIN unless ORDERED By Neurosurgery, senna-docusate, polyethylene glycol/lytes, magnesium hydroxide, bisacodyl, sodium phosphate, acetaminophen **FOLLOWED BY** [START ON 12/19/2023] acetaminophen, diphenhydrAMINE **OR** diphenhydrAMINE, ondansetron, ondansetron, tizanidine, diazePAM     Allergies:  Patient has no known allergies.     Assessment & Plan:  The patient presents functional deficits in mobility and self-care as well as cognitive deficits and swallowing/speech, and Moderate  de-conditioning. Patient was previously Independent using None living with Alone and Unable to Care For Self in a 1 Story House with 1 step(s) to enter. Per most recent PT note they are Minimal Assist for mobility with Front Wheel Walker and Minimal Assist for transfers.  Per most recent OT note they are Maximal Assist at LB dressing,  (guardado cath, declined need for BM)     The patient's current diet is:  Current Diet Order  Procedures   Diet Order Diet: Regular        The patient is a Good candidate for an acute inpatient rehabilitation program with a coordinated program of care at an intensity and frequency not available at a lower level of care.      Note: This recommendation requires that patient has at least CGA/Minimal Assistance needs in at least two therapy disciplines.  If patient progresses to no longer need CGA/Theresa with at least two therapy disciplines they may be more appropriate for Skilled nursing facility versus home with home health.       This recommendation is  "substantiated by the patient's current medical condition with intervention and assessment of medical issues requiring an acute level of care for patient's safety and maximum outcome. A coordinated program of care will be provided by an interdisciplinary team including physical therapy, occupational therapy, and speech language pathology. Rehab goals include improved cognition, mobility, self-care management, strength and conditioning/endurance, pain management, bowel and bladder management, mood and affect, and safety with independent home management including caregiver training. Estimated length of stay is approximately 10-14 days days. Rehab potential: Good. Disposition: to pre-morbid independent living setting with supportive care of patient's family. We will continue to follow with you in anticipation of discharge to acute inpatient rehabilitation when medically stable to do so at the discretion of the attending physician. Thank you for allowing us to participate in this patient's care. Please call with any questions regarding this recommendation.     ____________________________________     Randy Mcclain MD          Current Vital Signs:   Temperature: 36.8 °C (98.2 °F) Pulse: 88 Respiration: 16 Blood Pressure : (!) 138/99  Weight: 60.5 kg (133 lb 6.1 oz) Height: 162.6 cm (5' 4\")  Pulse Oximetry: 90 % O2 (LPM): 0      Completed Laboratory Reports:  Recent Labs     12/16/23  0123   WBC 9.6   HEMOGLOBIN 11.6*   HEMATOCRIT 33.4*   PLATELETCT 236   SODIUM 130*   POTASSIUM 3.7   BUN 12   CREATININE 0.28*   GLUCOSE 109*     Additional Labs: Not Applicable    Prior Living Situation:   Housing / Facility: 1 Story House  Steps Into Home: 1  Steps In Home: 0  Lives with - Patient's Self Care Capacity: Alone and Unable to Care For Self  Equipment Owned: Front-Wheel Walker    Prior Level of Function / Living Situation:   Physical Therapy: Prior Services: Home-Independent  Housing / Facility: 1 Story House  Steps Into Home: " 1  Steps In Home: 0  Equipment Owned: Front-Wheel Walker  Lives with - Patient's Self Care Capacity: Alone and Unable to Care For Self  Bed Mobility: Independent  Transfer Status: Independent  Ambulation: Independent  Assistive Devices Used: None  Stairs: Independent  Current Level of Function:   Gait Level Of Assist: Minimal Assist  Assistive Device: Front Wheel Walker  Distance (Feet): 50  Deviation: Shuffled Gait, Decreased Base Of Support, Decreased Toe Off, Decreased Heel Strike  Weight Bearing Status: no restrictions  Skilled Intervention: Verbal Cuing, Tactile Cuing, Facilitation, Compensatory Strategies  Supine to Sit: Minimal Assist  Sit to Supine: Moderate Assist  Scooting: Minimal Assist  Rolling: Minimal Assist to Rt.  Skilled Intervention: Verbal Cuing, Facilitation, Compensatory Strategies  Comments: Required facilitation and cues for maintaining spinal precautions with bed mobility.  Sit to Stand: Minimal Assist  Bed, Chair, Wheelchair Transfer: Minimal Assist  Transfer Method: Stand Step     Occupational Therapy:   Self Feeding: Independent  Grooming / Hygiene: Independent  Bathing: Independent  Dressing: Independent  Toileting: Independent  Prior Services: Home-Independent  Housing / Facility: [address] [place] [address]  Current Level of Function:   Upper Body Dressing: Maximal Assist (don/doff LSO)  Lower Body Dressing: Maximal Assist  Toileting:  (guardado cath, declined need for BM)  Speech Language Pathology:      Rehabilitation Prognosis/Potential: Good  Estimated Length of Stay:12-14  days    Nursing:      Continent    Scope/Intensity of Services Recommended:  Physical Therapy: 1.5 hr / day  5 days / week. Therapeutic Interventions Required: Maximize Endurance, Mobility, Strength, and Safety  Occupational Therapy: 1.5 hr / day 5 days / week. Therapeutic Interventions Required: Maximize Self Care, ADLs, IADLs, and Energy Conservation  Rehabilitation Nursin/. Therapeutic Interventions Required: Monitor  Pain, Skin, Wound(s), Vital Signs, Intake and Output, Labs, Safety, Aspiration Risk, and Family Training  Rehabilitation Physician: 3 - 5 days / week. Therapeutic Interventions Required: Medical Management  Respiratory Care: consult. Therapeutic Interventions Required: Pulmonary Toileting  Dietician: consult. Therapeutic Interventions Required: Please advise on a diet that is both healthy and promotes healing    She requires 24-hour rehabilitation nursing to manage bowel and bladder function, skin care, surgical incision, wound, nutrition and fluid intake, pulmonary hygiene, pain control, safety, and medication management. In addition, rehabilitation nursing will reiterate and reinforce therapy skills and equipment use, including ADLs, as well as provide education to the patient and family. Rin Yung is willing to participate in and is able to tolerate the proposed plan of care.    Rehabilitation Goals and Plan (Expected frequency & duration of treatment in the IRF):   Return to the Community and Family Able to Provide 24/7 Assistance  Anticipated Date of Rehabilitation Admission: 12/19/2023  Patient/Family oriented IRF level of care/facility/plan: Yes  Patient/Family willing to participate in IRF care/facility/plan: Yes  Patient able to tolerate IRF level of care proposed: Yes  Patient has potential to benefit IRF level of care proposed: Yes  Comments: Spoke with son Alin,  Upon discharge Rin will have 24/7 between Alin, his brother and his aunt.  They are preparing to rotate care between the three of them.  Rin goff is a single story home with 2 NAYANA.  Discussed visiting, family training, patient being in patient through the holidays.  Alin is in agreement with family training and admission    Special Needs or Precautions - Medical Necessity:  Safety Concerns/Precautions:  Fall Risk / High Risk for Falls and Cognition  Diet:   DIET ORDERS (From admission to next 24h)       Start     Ordered     12/15/23 1921  Diet Order Diet: Regular  ALL MEALS        Question:  Diet:  Answer:  Regular    12/15/23 1920                    Anticipated Discharge Destination / Patient/Family Goal:  Destination: Home with Assistance Support System: Family   Anticipated home health services: OT, PT, SLP, and Nursing  Previously used  service/ provider: Not Applicable  Anticipated DME Needs: Walker  Outpatient Services: OT and PT  Alternative resources to address additional identified needs:   No future appointments.    Pre-Screen Completed: 12/18/2023 1:32 PM Bella Zamorano L.P.N.

## 2023-12-18 NOTE — PROGRESS NOTES
0200 patient is very agitated and restless. She talked to her son on the phone. Also, this RN and charge nurse talked to patient's son.

## 2023-12-18 NOTE — PROGRESS NOTES
Received in bed sleeping, sitter at bedside, AAOx3, thought she was at home, KATARZYNA strong and equal, pain 2/10, back dressing CDI,guardado to dd with adeq UO POC discussed, up to chair with LSO

## 2023-12-19 ENCOUNTER — HOSPITAL ENCOUNTER (INPATIENT)
Facility: REHABILITATION | Age: 73
LOS: 8 days | DRG: 052 | End: 2023-12-27
Attending: PHYSICAL MEDICINE & REHABILITATION | Admitting: PHYSICAL MEDICINE & REHABILITATION
Payer: MEDICARE

## 2023-12-19 VITALS
RESPIRATION RATE: 16 BRPM | BODY MASS INDEX: 22.77 KG/M2 | DIASTOLIC BLOOD PRESSURE: 97 MMHG | HEART RATE: 74 BPM | WEIGHT: 133.38 LBS | TEMPERATURE: 98.2 F | HEIGHT: 64 IN | SYSTOLIC BLOOD PRESSURE: 154 MMHG | OXYGEN SATURATION: 95 %

## 2023-12-19 DIAGNOSIS — I10 HYPERTENSION, UNSPECIFIED TYPE: ICD-10-CM

## 2023-12-19 PROBLEM — G93.41 ACUTE METABOLIC ENCEPHALOPATHY: Status: ACTIVE | Noted: 2023-12-19

## 2023-12-19 PROCEDURE — 700102 HCHG RX REV CODE 250 W/ 637 OVERRIDE(OP): Performed by: PHYSICAL MEDICINE & REHABILITATION

## 2023-12-19 PROCEDURE — 99223 1ST HOSP IP/OBS HIGH 75: CPT | Performed by: PHYSICAL MEDICINE & REHABILITATION

## 2023-12-19 PROCEDURE — 700111 HCHG RX REV CODE 636 W/ 250 OVERRIDE (IP): Mod: JZ | Performed by: PHYSICAL MEDICINE & REHABILITATION

## 2023-12-19 PROCEDURE — A9270 NON-COVERED ITEM OR SERVICE: HCPCS | Performed by: NURSE PRACTITIONER

## 2023-12-19 PROCEDURE — 700102 HCHG RX REV CODE 250 W/ 637 OVERRIDE(OP): Performed by: NURSE PRACTITIONER

## 2023-12-19 PROCEDURE — A9270 NON-COVERED ITEM OR SERVICE: HCPCS | Performed by: PHYSICAL MEDICINE & REHABILITATION

## 2023-12-19 PROCEDURE — 770010 HCHG ROOM/CARE - REHAB SEMI PRIVAT*

## 2023-12-19 PROCEDURE — 94760 N-INVAS EAR/PLS OXIMETRY 1: CPT

## 2023-12-19 RX ORDER — LANOLIN ALCOHOL/MO/W.PET/CERES
3 CREAM (GRAM) TOPICAL
Status: DISCONTINUED | OUTPATIENT
Start: 2023-12-19 | End: 2023-12-20

## 2023-12-19 RX ORDER — ENOXAPARIN SODIUM 100 MG/ML
40 INJECTION SUBCUTANEOUS DAILY
Status: CANCELLED | OUTPATIENT
Start: 2023-12-19

## 2023-12-19 RX ORDER — OXYCODONE HYDROCHLORIDE 5 MG/1
2.5 TABLET ORAL EVERY 4 HOURS PRN
Status: CANCELLED | OUTPATIENT
Start: 2023-12-19

## 2023-12-19 RX ORDER — HYDRALAZINE HYDROCHLORIDE 25 MG/1
25 TABLET, FILM COATED ORAL EVERY 8 HOURS PRN
Status: DISCONTINUED | OUTPATIENT
Start: 2023-12-19 | End: 2023-12-27 | Stop reason: HOSPADM

## 2023-12-19 RX ORDER — AMLODIPINE BESYLATE 5 MG/1
2.5 TABLET ORAL DAILY
Status: CANCELLED | OUTPATIENT
Start: 2023-12-20

## 2023-12-19 RX ORDER — OMEPRAZOLE 20 MG/1
20 CAPSULE, DELAYED RELEASE ORAL DAILY
Status: DISCONTINUED | OUTPATIENT
Start: 2023-12-20 | End: 2023-12-27 | Stop reason: HOSPADM

## 2023-12-19 RX ORDER — ONDANSETRON 2 MG/ML
4 INJECTION INTRAMUSCULAR; INTRAVENOUS 4 TIMES DAILY PRN
Status: DISCONTINUED | OUTPATIENT
Start: 2023-12-19 | End: 2023-12-27 | Stop reason: HOSPADM

## 2023-12-19 RX ORDER — LISINOPRIL 10 MG/1
10 TABLET ORAL DAILY
Status: CANCELLED | OUTPATIENT
Start: 2023-12-20

## 2023-12-19 RX ORDER — AMOXICILLIN 250 MG
2 CAPSULE ORAL 2 TIMES DAILY
Status: DISCONTINUED | OUTPATIENT
Start: 2023-12-19 | End: 2023-12-27 | Stop reason: HOSPADM

## 2023-12-19 RX ORDER — ACETAMINOPHEN 325 MG/1
650 TABLET ORAL EVERY 4 HOURS PRN
Status: CANCELLED | OUTPATIENT
Start: 2023-12-19

## 2023-12-19 RX ORDER — DOCUSATE SODIUM 100 MG/1
100 CAPSULE, LIQUID FILLED ORAL 2 TIMES DAILY
Status: CANCELLED | OUTPATIENT
Start: 2023-12-19

## 2023-12-19 RX ORDER — POLYETHYLENE GLYCOL 3350 17 G/17G
1 POWDER, FOR SOLUTION ORAL
Status: DISCONTINUED | OUTPATIENT
Start: 2023-12-19 | End: 2023-12-27 | Stop reason: HOSPADM

## 2023-12-19 RX ORDER — ENOXAPARIN SODIUM 100 MG/ML
40 INJECTION SUBCUTANEOUS DAILY
Status: DISCONTINUED | OUTPATIENT
Start: 2023-12-19 | End: 2023-12-27 | Stop reason: HOSPADM

## 2023-12-19 RX ORDER — BISACODYL 10 MG
10 SUPPOSITORY, RECTAL RECTAL
Status: DISCONTINUED | OUTPATIENT
Start: 2023-12-19 | End: 2023-12-27 | Stop reason: HOSPADM

## 2023-12-19 RX ORDER — ONDANSETRON 4 MG/1
4 TABLET, ORALLY DISINTEGRATING ORAL 4 TIMES DAILY PRN
Status: DISCONTINUED | OUTPATIENT
Start: 2023-12-19 | End: 2023-12-27 | Stop reason: HOSPADM

## 2023-12-19 RX ORDER — METHOCARBAMOL 500 MG/1
250 TABLET, FILM COATED ORAL 4 TIMES DAILY PRN
Status: CANCELLED | OUTPATIENT
Start: 2023-12-19

## 2023-12-19 RX ORDER — CALCIUM CARBONATE 500 MG/1
500 TABLET, CHEWABLE ORAL 2 TIMES DAILY
Status: CANCELLED | OUTPATIENT
Start: 2023-12-19

## 2023-12-19 RX ORDER — DOCUSATE SODIUM 100 MG/1
100 CAPSULE, LIQUID FILLED ORAL 2 TIMES DAILY
Status: DISCONTINUED | OUTPATIENT
Start: 2023-12-19 | End: 2023-12-19

## 2023-12-19 RX ORDER — OXYCODONE HYDROCHLORIDE 5 MG/1
2.5 TABLET ORAL EVERY 4 HOURS PRN
Status: DISCONTINUED | OUTPATIENT
Start: 2023-12-19 | End: 2023-12-27 | Stop reason: HOSPADM

## 2023-12-19 RX ORDER — ECHINACEA PURPUREA EXTRACT 125 MG
2 TABLET ORAL PRN
Status: DISCONTINUED | OUTPATIENT
Start: 2023-12-19 | End: 2023-12-27 | Stop reason: HOSPADM

## 2023-12-19 RX ORDER — AMLODIPINE BESYLATE 5 MG/1
2.5 TABLET ORAL DAILY
Status: DISCONTINUED | OUTPATIENT
Start: 2023-12-20 | End: 2023-12-27 | Stop reason: HOSPADM

## 2023-12-19 RX ORDER — METHOCARBAMOL 500 MG/1
250 TABLET, FILM COATED ORAL 4 TIMES DAILY PRN
Status: DISCONTINUED | OUTPATIENT
Start: 2023-12-19 | End: 2023-12-27 | Stop reason: HOSPADM

## 2023-12-19 RX ORDER — ACETAMINOPHEN 325 MG/1
650 TABLET ORAL EVERY 6 HOURS PRN
Status: DISCONTINUED | OUTPATIENT
Start: 2023-12-19 | End: 2023-12-19

## 2023-12-19 RX ORDER — ACETAMINOPHEN 325 MG/1
650 TABLET ORAL EVERY 6 HOURS PRN
Status: CANCELLED | OUTPATIENT
Start: 2023-12-19

## 2023-12-19 RX ORDER — CALCIUM CARBONATE 500 MG/1
500 TABLET, CHEWABLE ORAL 2 TIMES DAILY
Status: DISCONTINUED | OUTPATIENT
Start: 2023-12-19 | End: 2023-12-27 | Stop reason: HOSPADM

## 2023-12-19 RX ORDER — ACETAMINOPHEN 325 MG/1
650 TABLET ORAL EVERY 4 HOURS PRN
Status: DISCONTINUED | OUTPATIENT
Start: 2023-12-19 | End: 2023-12-27 | Stop reason: HOSPADM

## 2023-12-19 RX ORDER — LISINOPRIL 5 MG/1
10 TABLET ORAL DAILY
Status: DISCONTINUED | OUTPATIENT
Start: 2023-12-20 | End: 2023-12-27 | Stop reason: HOSPADM

## 2023-12-19 RX ADMIN — METOPROLOL TARTRATE 25 MG: 25 TABLET, FILM COATED ORAL at 04:07

## 2023-12-19 RX ADMIN — ENOXAPARIN SODIUM 40 MG: 100 INJECTION SUBCUTANEOUS at 17:44

## 2023-12-19 RX ADMIN — AMLODIPINE BESYLATE 2.5 MG: 5 TABLET ORAL at 04:07

## 2023-12-19 RX ADMIN — ACETAMINOPHEN 650 MG: 325 TABLET, FILM COATED ORAL at 04:07

## 2023-12-19 RX ADMIN — DOCUSATE SODIUM 100 MG: 100 CAPSULE, LIQUID FILLED ORAL at 04:07

## 2023-12-19 RX ADMIN — ESCITALOPRAM OXALATE 10 MG: 10 TABLET ORAL at 04:07

## 2023-12-19 RX ADMIN — CALCIUM CARBONATE (ANTACID) CHEW TAB 500 MG 500 MG: 500 CHEW TAB at 15:01

## 2023-12-19 RX ADMIN — LISINOPRIL 10 MG: 10 TABLET ORAL at 04:07

## 2023-12-19 RX ADMIN — ANTACID TABLETS 500 MG: 500 TABLET, CHEWABLE ORAL at 04:07

## 2023-12-19 RX ADMIN — METOPROLOL TARTRATE 25 MG: 25 TABLET, FILM COATED ORAL at 17:44

## 2023-12-19 RX ADMIN — CALCIUM CARBONATE (ANTACID) CHEW TAB 500 MG 500 MG: 500 CHEW TAB at 19:49

## 2023-12-19 ASSESSMENT — PATIENT HEALTH QUESTIONNAIRE - PHQ9
1. LITTLE INTEREST OR PLEASURE IN DOING THINGS: NOT AT ALL
2. FEELING DOWN, DEPRESSED, IRRITABLE, OR HOPELESS: NOT AT ALL
SUM OF ALL RESPONSES TO PHQ9 QUESTIONS 1 AND 2: 0
2. FEELING DOWN, DEPRESSED, IRRITABLE, OR HOPELESS: NOT AT ALL
SUM OF ALL RESPONSES TO PHQ9 QUESTIONS 1 AND 2: 0
1. LITTLE INTEREST OR PLEASURE IN DOING THINGS: NOT AT ALL

## 2023-12-19 ASSESSMENT — LIFESTYLE VARIABLES
EVER FELT BAD OR GUILTY ABOUT YOUR DRINKING: NO
ON A TYPICAL DAY WHEN YOU DRINK ALCOHOL HOW MANY DRINKS DO YOU HAVE: 0
EVER HAD A DRINK FIRST THING IN THE MORNING TO STEADY YOUR NERVES TO GET RID OF A HANGOVER: NO
HOW MANY TIMES IN THE PAST YEAR HAVE YOU HAD 5 OR MORE DRINKS IN A DAY: 0
AVERAGE NUMBER OF DAYS PER WEEK YOU HAVE A DRINK CONTAINING ALCOHOL: 0
TOTAL SCORE: 0
TOTAL SCORE: 0
ALCOHOL_USE: NO
CONSUMPTION TOTAL: NEGATIVE
HAVE PEOPLE ANNOYED YOU BY CRITICIZING YOUR DRINKING: NO
EVER_SMOKED: NEVER
HAVE YOU EVER FELT YOU SHOULD CUT DOWN ON YOUR DRINKING: NO
TOTAL SCORE: 0

## 2023-12-19 ASSESSMENT — PAIN DESCRIPTION - PAIN TYPE: TYPE: ACUTE PAIN;SURGICAL PAIN

## 2023-12-19 ASSESSMENT — FIBROSIS 4 INDEX: FIB4 SCORE: 1.89

## 2023-12-19 NOTE — FLOWSHEET NOTE
12/19/23 1345   Events/Summary/Plan   Events/Summary/Plan RT Consult   Vital Signs   Pulse 90   Respiration 18   Pulse Oximetry 92 %   $ Pulse Oximetry (Spot Check) Yes   Respiratory Assessment   Level of Consciousness Alert   Chest Exam   Work Of Breathing / Effort Within Normal Limits   Breath Sounds   RUL Breath Sounds Clear   RML Breath Sounds Clear   RLL Breath Sounds Diminished   ADOLFO Breath Sounds Clear   LLL Breath Sounds Diminished   Oxygen   O2 Delivery Device Room air w/o2 available   Smoking History   Have you ever smoked Never

## 2023-12-19 NOTE — PROGRESS NOTES
Neurosurgery Progress Note    Subjective:  Pt in bed  Pain currently controlled, denies leg pain  Lopez removed- voiding, +bm    Exam:  AAO, cooperative, df/pf- 5/5, incision with drsg    BP  Min: 135/78  Max: 162/112  Pulse  Av.3  Min: 74  Max: 98  Resp  Av.8  Min: 16  Max: 20  Temp  Av.8 °C (98.2 °F)  Min: 36.7 °C (98.1 °F)  Max: 36.8 °C (98.2 °F)  SpO2  Av.3 %  Min: 90 %  Max: 95 %    No data recorded                          Intake/Output                         23 0700 - 23 0659 23 07 - 23 0659     [id number] Total 8622-0406 9055-4746 Total                 Intake    P.O.  540  -- 540  --  -- --    P.O. 540 -- 540 -- -- --    Total Intake 540 -- 540 -- -- --       Output    Urine  1650  -- 1650  --  -- --    Number of Times Voided 1 x 1 x 2 x 1 x -- 1 x    Urine Void (mL) 1350 -- 1350 -- -- --    Output (mL) ([REMOVED] Urethral Catheter Temperature probe 16 Fr. 23 1126) 300 -- 300 -- -- --    Stool  --  -- --  --  -- --    Number of Times Stooled 1 x -- 1 x 1 x -- 1 x    Total Output 1650 -- 1650 -- -- --       Net I/O     -1110 -- -1110 -- -- --              Intake/Output Summary (Last 24 hours) at 2023 0926  Last data filed at 2023 1610  Gross per 24 hour   Intake 540 ml   Output 1650 ml   Net -1110 ml               metoclopramide  10 mg Q6HRS PRN    oxyCODONE immediate-release  5-10 mg Q4HRS PRN    HYDROmorphone  0.5 mg Q3HRS PRN    amLODIPine  2.5 mg DAILY    escitalopram  10 mg DAILY    lisinopril  10 mg DAILY    metoprolol tartrate  25 mg BID    Pharmacy Consult Request  1 Each PHARMACY TO DOSE    MD ALERT...DO NOT ADMINISTER NSAIDS or ASPIRIN unless ORDERED By Neurosurgery  1 Each PRN    docusate sodium  100 mg BID    senna-docusate  1 Tablet Nightly    senna-docusate  1 Tablet Q24HRS PRN    polyethylene glycol/lytes  1 Packet BID PRN    magnesium hydroxide  30 mL QDAY PRN    bisacodyl  10 mg Q24HRS PRN    sodium phosphate  1 Each  Once PRN    0.9 % NaCl with KCl 20 mEq 1,000 mL   Continuous    acetaminophen  650 mg Q6HRS    Followed by    acetaminophen  650 mg Q6HRS PRN    diphenhydrAMINE  25 mg Q6HRS PRN    Or    diphenhydrAMINE  25 mg Q6HRS PRN    ondansetron  4 mg Q4HRS PRN    ondansetron  4 mg Q4HRS PRN    tizanidine  2 mg TID PRN    diazePAM  5 mg Q6HRS PRN    calcium carbonate  500 mg BID       Assessment and Plan:  POD# 5 s/p L3-4 TLIF ext of fusion  Chemical prophylactic DVT therapy: No  Start date/time: n/a      Neuro exam stable, sundowning. Sitter qhs  Encouraged up to chair/activity during day, sleep aid prn. Encourage sleep wake cycle, d/w son this weekend  PT/OT  Bowel protocol  Continue pain control  Clear to transfer to rehab    Outpatient neurology referral placed for dementia workup

## 2023-12-19 NOTE — DISCHARGE SUMMARY
Discharge Summary    CHIEF COMPLAINT ON ADMISSION  No chief complaint on file.      Reason for Admission  Lumbar stenosis without neurogenic*     Admission Date  12/14/2023    CODE STATUS  Full Code    HPI & HOSPITAL COURSE  This is a 73 y.o. female here for lumbar surgery. Her mobility has been limited postoperatively and she has been having some difficulty with pain and confusion. Her baseline is sundowning and she required a sitter overnight. She is voiding and has had a bowel movement and continues to increase her activity. Her incision is without signs of infection and her vitals are stable.  No notes on file    Therefore, she is discharged in fair and stable condition to an inpatient rehabilitation hospital.    The patient met 2-midnight criteria for an inpatient stay at the time of discharge.    Discharge Date  12/19/23    FOLLOW UP ITEMS POST DISCHARGE  Ambulate as much as comfortable  Wear brace when out of bed  Ok to shower, pat area dry, may remove brace to shower  No dressing, leave open to air  No Aspirin or NSAIDs   No driving for 2 weeks/ No driving while on narcotic medication  Over the counter stool softeners daily while on narcotics  No lifting greater than 10 pounds, no repetitive bending or twisting  Call with signs of infection (fever, chills, redness, drainage)  Remove staples 12/28  Follow up at [facility] 6 weeks after surgery    DISCHARGE DIAGNOSES  Principal Problem:    Lumbar stenosis without neurogenic claudication (POA: Yes)  Active Problems:    PONV (postoperative nausea and vomiting) (POA: Unknown)    HTN (hypertension) (POA: Unknown)  Resolved Problems:    * No resolved hospital problems. *      FOLLOW UP  No future appointments.  No follow-up provider specified.    MEDICATIONS ON DISCHARGE     Medication List        START taking these medications        Instructions   oxyCODONE immediate-release 5 MG Tabs  Commonly known as: Roxicodone   Take 1-2 Tablets by mouth every 3  hours as needed for Severe Pain for up to 3 days.  Dose: 5-10 mg     tizanidine 2 MG tablet  Commonly known as: Zanaflex   Take 1 Tablet by mouth 3 times a day as needed (spasm) for up to 3 days.  Dose: 2 mg            CONTINUE taking these medications        Instructions   alendronate 70 MG Tabs  Commonly known as: Fosamax   Take 70 mg by mouth every 7 days.  Dose: 70 mg     amLODIPine 2.5 MG Tabs  Commonly known as: Norvasc   Take 2.5 mg by mouth every day.  Dose: 2.5 mg     CHELATED MAGNESIUM PO   Take 1 Tablet by mouth every day.  Dose: 1 Tablet     escitalopram 10 MG Tabs  Commonly known as: Lexapro   Take 10 mg by mouth every day.  Dose: 10 mg     FIBER PO   Take 1 Capsule by mouth every day.  Dose: 1 Capsule     lisinopril 10 MG Tabs  Commonly known as: Prinivil   Take 10 mg by mouth every day.  Dose: 10 mg     metoprolol tartrate 25 MG Tabs  Commonly known as: Lopressor   Take 25 mg by mouth 2 times a day.  Dose: 25 mg     POTASSIUM PO   Take 1 Tablet by mouth every day.  Dose: 1 Tablet     Probiotic Tbec   Take 1 Tablet by mouth every day.  Dose: 1 Tablet            STOP taking these medications      B COMPLEX PO     Coenzyme Q10 100 MG Caps     fish oil 1000 MG Caps capsule     MULTIVITAMIN PO     Non Formulary Request     Vitamin C 500 MG Caps     vitamin D3 125 MCG (5000 UT) Caps              Allergies  No Known Allergies    DIET  Orders Placed This Encounter   Procedures    Diet Order Diet: Regular     Standing Status:   Standing     Number of Occurrences:   1     Order Specific Question:   Diet:     Answer:   Regular [1]       ACTIVITY  As tolerated.  Weight bearing as tolerated    CONSULTATIONS  Physiatry    PROCEDURES  L3-4 TLIF, extension of fusion    LABORATORY  Lab Results   Component Value Date    SODIUM 130 (L) 12/16/2023    POTASSIUM 3.7 12/16/2023    CHLORIDE 99 12/16/2023    CO2 23 12/16/2023    GLUCOSE 109 (H) 12/16/2023    BUN 12 12/16/2023    CREATININE 0.28 (L) 12/16/2023    GLOMRATE 104  07/01/2022        Lab Results   Component Value Date    WBC 9.6 12/16/2023    HEMOGLOBIN 11.6 (L) 12/16/2023    HEMATOCRIT 33.4 (L) 12/16/2023    PLATELETCT 236 12/16/2023

## 2023-12-19 NOTE — THERAPY
Occupational Therapy  Daily Treatment     Patient Name: Rin Yung  Age:  73 y.o., Sex:  female  Medical Record #: [id number]  Today's Date: 12/18/2023     Precautions: Fall Risk, Spinal / Back Precautions , Lumbosacral Orthosis  Comments: LSO when OOB>5 min for d/c home use    Assessment    Pt seen for OT tx. Pt received sitting at EOB from nursing staff and agreeable to OT session. Pt demo progress toward OT goals but continues to be limited by weakness, fatigue, balance deficits, and cognitive deficits causing her to require min-mod A to complete ADLs, functional mobility, and txfs. During session, pt demo difficulty with sequencing tasks necessitating increased verbal and tactile cues to complete ADL tasks appropriately. Continue to recommend post-acute placement prior to DC home. Will continue to follow for ongoing acute OT services.     Plan    Treatment Plan Status: Continue Current Treatment Plan  Type of Treatment: Self Care / Activities of Daily Living, Adaptive Equipment, Neuro Re-Education / Balance, Therapeutic Exercises, Therapeutic Activity  Treatment Frequency: 5 Times per Week  Treatment Duration: Until Therapy Goals Met    DC Equipment Recommendations: Unable to determine at this time  Discharge Recommendations: Recommend post-acute placement for additional occupational therapy services prior to discharge home     Objective      Vitals   O2 Delivery Device None - Room Air   Pain 0 - 10 Group   Therapist Pain Assessment Post Activity Pain Same as Prior to Activity;Nurse Notified  (Not rated, agreeable to activity)   Non Verbal Descriptors   Non Verbal Scale  Calm;Unlabored Breathing   Cognition    Cognition / Consciousness X   Level of Consciousness Alert   Ability To Follow Commands 1 Step   Safety Awareness Impaired   New Learning Impaired   Attention Impaired   Comments Pleasantly confused but cooperative during session. Demo difficulty with sequencing tasks. Able to recall that she is going  to [facility] tomorrow, but perseverated on getting her personal items from a place she was recently staying at and getting a bag to place her underwear in.   Fine Motor / Dexterity    Comments  Demo difficulty with coordination and possible dysmetria as evidence by increased difficulty with toilet paper dispenser and performance during hand washing   Balance   Sitting Balance (Static) Fair   Sitting Balance (Dynamic) Fair   Standing Balance (Static) Poor +   Standing Balance (Dynamic) Poor   Weight Shift Sitting Fair   Weight Shift Standing Fair   Skilled Intervention Verbal Cuing;Tactile Cuing;Facilitation;Compensatory Strategies   Comments w/FWW   Bed Mobility    Sit to Supine Contact Guard Assist   Scooting Contact Guard Assist   Rolling Contact Guard Assist   Skilled Intervention Verbal Cuing;Tactile Cuing;Facilitation;Compensatory Strategies   Comments Received sitting EOB from nursing   Activities of Daily Living   Eating Minimal Assist   Grooming Minimal Assist;Standing  (Washing hands at sink, required cues as to how to turn on the water as she was holding onto the top of the faucet)   Upper Body Dressing Moderate Assist  (don/doff LSO, required multimodal cues throughout)   Lower Body Dressing Moderate Assist  (Able to doff socks in tailor sit, but demo difficulty with sequencing how to don socks in tailor sit. Would attempt to shift weight posteriorly to try and bring foot up closer. Attempted to don a sock over another one and unable to self-correct)   Toileting Minimal Assist  (Required assist with balance while completing pericare after BM on standard toilet)   Skilled Intervention Verbal Cuing;Tactile Cuing;Sequencing;Facilitation;Compensatory Strategies   6 Clicks Daily Activity Score 13   Functional Mobility   Sit to Stand Minimal Assist   Bed, Chair, Wheelchair Transfer Minimal Assist   Toilet Transfers Minimal Assist   Mobility Functional mobility in room w/FWW   Skilled Intervention Verbal Cuing;Tactile  Cuing;Sequencing;Facilitation;Compensatory Strategies   Activity Tolerance   Sitting in Chair 10 min  (Toilet)   Sitting Edge of Bed 8 min   Standing 5 min   Patient / Family Goals   Patient / Family Goal #1 go home   Goal #1 Outcome Progressing as expected   Short Term Goals   Short Term Goal # 1 pt will demo ADL txfs with supv   Goal Outcome # 1 Progressing as expected   Short Term Goal # 2 pt will groom at the sink w/ supv   Goal Outcome # 2 Progressing as expected   Short Term Goal # 3 pt will don/doff LSO w/ supv   Goal Outcome # 3 Progressing as expected   Short Term Goal # 4 pt will dress LB with supv and AE prn   Goal Outcome # 4 Progressing as expected   Education Group   Education Provided Brace Wear and Care;Role of Occupational Therapist;Activities of Daily Living   Role of Occupational Therapist Patient Response Patient;Acceptance;Explanation;Reinforcement Needed   Brace Wear & Care Patient Response Patient;Acceptance;Explanation;Demonstration;Teach Back;Reinforcement Needed   ADL Patient Response Patient;Acceptance;Explanation;Reinforcement Needed

## 2023-12-19 NOTE — PROGRESS NOTES
Patient admitted to facility at 1333 via [facility]; accompanied by hospital transport.  Patient assisted to room and positioned in bed for comfort and safety; call light within reach.  Patient assisted with stowing belongings and oriented to room and facility.  Admission assessment performed and documented in computer.      Patient confused in posey for safety.

## 2023-12-19 NOTE — CARE PLAN
The patient is Stable - Low risk of patient condition declining or worsening    Shift Goals  Clinical Goals: pain control, safety, discharge, re orient prn  Problem: Knowledge Deficit - Standard  Goal: Patient and family/care givers will demonstrate understanding of plan of care, disease process/condition, diagnostic tests and medications  Outcome: Progressing   POC discussed with the patient. Noted some confusion, re oriented.  Safety sitter at the bedside. Report given to STARR Vines at [facility]. Questions answered. Verbalized understanding.    Problem: Fall Risk  Goal: Patient will remain free from falls  Outcome: Progressing   Fall protocol in effect. Non skid socks in use. Call light within reach. Reminded patient to call for assist. Kept room clutter free. Hourly rounds in effect.     Patient Goals: pain control, comfort  Family Goals: no family present    Progress made toward(s) clinical / shift goals:      Patient is not progressing towards the following goals:

## 2023-12-19 NOTE — PROGRESS NOTES
0650 Patient's in bed. Bedside report received from LINDA ESTES RN at the beginning of the shift. Safety Sitter at the bedside.    0800 Patient's in bed. Noted confused to time, re oriented. Safety sitter at the bedside. Educated on the importance/use of IS at least 10x every hour while awake, able to reach 1000. Fall protocol in effect. Call light within reach. Reminded patient to call for assist. Assessment completed. No distress noted. Reviewed with the patient and son. Questions answered. Verbalized understanding. Tele sitter in use.     0929 JOHNNY Kang visited. POC discussed with the patient. New order received and acknowledged for the patient to be discharged.    1135 Patient's in bed. No distress noted.     1214  Report given to Jasmyn LUX RN at [facility].    1220 Placed a call to Alin (son).  Patient's agreeable for the [facility] to [facility]. Verified with Kathy Wilcox RN.    1323 Patient was discharged with patient's belongings, d/c packet,  LSO brace via [facility] accompanied by two [facility] Transports to [facility].

## 2023-12-19 NOTE — THERAPY
Physical Therapy   Daily Treatment     Patient Name: Rin Yung  Age:  73 y.o., Sex:  female  Medical Record #: [id number]  Today's Date: 12/18/2023     Precautions  Precautions: Fall Risk;Spinal / Back Precautions ;Lumbosacral Orthosis  Comments: LSO when OOB>5 min for d/c home use    Assessment    Pt seen for PT tx session. Able to increase ambulation distance up to 300 ft today with FWW however continues to require min A for all OOB mobility 2/2 poor balance, impaired equilibrium reactions, and impaired safety awareness. Required total A to don LSO. Remains limited by above balance deficits as well as confusion, cognitive baseline unclear. Will follow.    Plan    Treatment Plan Status: Continue Current Treatment Plan  Type of Treatment: Bed Mobility, Gait Training, Neuro Re-Education / Balance, Self Care / Home Evaluation, Therapeutic Activities, Therapeutic Exercise  Treatment Frequency: 5 Times per Week  Treatment Duration: Until Therapy Goals Met    DC Equipment Recommendations: Unable to determine at this time  Discharge Recommendations: Recommend post-acute placement for additional physical therapy services prior to discharge home      Subjective    Pt received asleep in bed, easily woken with verbal cues and agreeable to participate.      Objective       12/18/23 1459   Precautions   Precautions Fall Risk;Spinal / Back Precautions ;Lumbosacral Orthosis   Comments LSO when OOB>5 min for d/c home use   Vitals   O2 Delivery Device None - Room Air   Pain 0 - 10 Group   Therapist Pain Assessment Post Activity Pain Same as Prior to Activity;Nurse Notified  (no specific c/o pain during session)   Cognition    Cognition / Consciousness X   Level of Consciousness Alert   Ability To Follow Commands 1 Step   Safety Awareness Impaired   New Learning Impaired   Attention Impaired   Comments cooperative, confused, muttering nonsensical statements throughout session   Balance   Sitting Balance (Static) Fair   Sitting  Balance (Dynamic) Fair   Standing Balance (Static) Poor +   Standing Balance (Dynamic) Poor   Weight Shift Sitting Fair   Weight Shift Standing Fair   Skilled Intervention Facilitation;Tactile Cuing;Verbal Cuing   Comments FWW   Bed Mobility    Supine to Sit Contact Guard Assist   Scooting Contact Guard Assist   Rolling Minimal Assist to Rt.   Skilled Intervention Facilitation;Sequencing;Tactile Cuing;Verbal Cuing   Comments required faciliation and cues to perform log roll, up in chair post   Gait Analysis   Gait Level Of Assist Minimal Assist   Assistive Device Front Wheel Walker   Distance (Feet) 300   # of Times Distance was Traveled 1   Deviation Decreased Base Of Support;Bradykinetic;Shuffled Gait   # of Stairs Climbed 0   Skilled Intervention Facilitation;Tactile Cuing;Verbal Cuing   Comments pt initially unsteady upon first stand and bed>toilet walk, became gradually more steady with time up   Functional Mobility   Sit to Stand Minimal Assist   Bed, Chair, Wheelchair Transfer Minimal Assist   Toilet Transfers Minimal Assist   Transfer Method Stand Step   Mobility bed>toilet>up in hallway with FWW>chair   Skilled Intervention Facilitation;Tactile Cuing;Verbal Cuing   How much difficulty does the patient currently have...   Turning over in bed (including adjusting bedclothes, sheets and blankets)? 2   Sitting down on and standing up from a chair with arms (e.g., wheelchair, bedside commode, etc.) 2   Moving from lying on back to sitting on the side of the bed? 2   How much help from another person does the patient currently need...   Moving to and from a bed to a chair (including a wheelchair)? 3   Need to walk in a hospital room? 3   Climbing 3-5 steps with a railing? 2   6 clicks Mobility Score 14   Short Term Goals    Short Term Goal # 1 pt will be able to complete supine<>sitting from flat bed with SPV in 6tx in order to improive independence   Goal Outcome # 1 Progressing slower than expected   Short Term  Goal # 2 pt will be able to complete functional transfers with FWW and SPV in 6tx in order to progress with therapy   Goal Outcome # 2 Progressing slower than expected   Short Term Goal # 3 pt will be able to ambulate 100ft with FWW and SPV in 6tx in order to decresae fall risk   Goal Outcome # 3 Progressing slower than expected   Physical Therapy Treatment Plan   Physical Therapy Treatment Plan Continue Current Treatment Plan   Anticipated Discharge Equipment and Recommendations   DC Equipment Recommendations Unable to determine at this time   Discharge Recommendations Recommend post-acute placement for additional physical therapy services prior to discharge home   Interdisciplinary Plan of Care Collaboration   IDT Collaboration with  Nursing   Patient Position at End of Therapy Seated;Call Light within Reach;Tray Table within Reach;Other (Comments)  (sitter in room)   Collaboration Comments RN updated   Session Information   Date / Session Number  12/18-3 (3/5, 12/21)

## 2023-12-19 NOTE — PROGRESS NOTES
4 Eyes Skin Assessment Completed by Taz RN and STARR Vines.    Head WDL  Ears WDL  Nose WDL  Mouth WDL  Neck WDL  Breast/Chest WDL  Shoulder Blades WDL  Spine Incision staple sites x2 drain site x1  (R) Arm/Elbow/Hand WDL  (L) Arm/Elbow/Hand WDL  Abdomen WDL  Groin WDL  Scrotum/Coccyx/Buttocks Redness and Blanching  (R) Leg WDL  (L) Leg WDL  (R) Heel/Foot/Toe WDL  (L) Heel/Foot/Toe WDL          Devices In Places none      Interventions In Place posey bed      Pictures Uploaded Into Epic Yes  Wound Consult Placed N/A  RN Wound Prevention Protocol Ordered No

## 2023-12-19 NOTE — CARE PLAN
The patient is Stable - Low risk of patient condition declining or worsening    Shift Goals  Clinical Goals: pain mgmt, safety  Patient Goals: pain control, rest, comfort, safety  Family Goals: not present    Progress made toward(s) clinical / shift goals:        Problem: Knowledge Deficit - Standard  Goal: Patient and family/care givers will demonstrate understanding of plan of care, disease process/condition, diagnostic tests and medications  Outcome: Progressing     Problem: Skin Integrity  Goal: Skin integrity is maintained or improved  Outcome: Progressing     Problem: Fall Risk  Goal: Patient will remain free from falls  Outcome: Progressing       Patient is not progressing towards the following goals:

## 2023-12-19 NOTE — H&P
Physical Medicine & Rehabilitation  History and Physical (H&P)  &     Post Admission Physician Evaluation (TANYA)       Date of Admission: 12/19/2023  Date of Service: 12/19/2023   Rin Yung  RH22/02    [facility] Code to Support Admission: 0004.111 - Spinal Cord Dysfunction, Non-Traumatic: Paraplegia, Incomplete  Etiologic Diagnosis: Acute metabolic encephalopathy    _______________________________________________    Chief Complaint: Weakness    History of Present Illness:  Patient is 73 y.o. female s/p elective L3-S1 lami with L3-L4 fusion and dural repair. Per son, she apparently has been struggling at home for the past year with her cognition, and believes she may have undiagnosed dementia. She has been sundowning and getting agitated in the afternoons/evenings while admitted. She was confused throughout the session, difficult to understand due to nonsensical speech, had difficulty sequencing tasks, and had a difficult time retaining new information. Strong posterior lean during seated/standing activities. Additional factors influencing patient status / progress: weakness, fatigue, impaired balance, impaired cognition.       FOLLOW UP ITEMS POST DISCHARGE  Ambulate as much as comfortable  Wear brace when out of bed  Ok to shower, pat area dry, may remove brace to shower  No dressing, leave open to air  No Aspirin or NSAIDs   No driving for 2 weeks/ No driving while on narcotic medication  Over the counter stool softeners daily while on narcotics  No lifting greater than 10 pounds, no repetitive bending or twisting  Call with signs of infection (fever, chills, redness, drainage)  Remove staples 12/28  Follow up at [facility] 6 weeks after surgery    Procedures:  ATE OF SERVICE:  12/15/2023      PREOPERATIVE DIAGNOSES:  PROCEDURES:  1.  Veronika, robotic-assisted placement of pedicle screws at L3, L4, L5   bilaterally, navigated with 6.5 x 45 mm screws placed at L3, 6.5 x 45 mm   screws placed at L4 and  6.5 x 45 mm screws placed at L5.  2.  Removal of hardware L4-L5 with replacement as stated above.  3.  Interbody fusion L3-L4 with use of 9 x 29.6 Catalyft cage, filled with   locally harvested bone graft and augmented anteriorly with OsteoAMP fibers.  4.  Posterolateral arthrodesis with the use of locally harvested bone graft,   OsteoAMP fibers and allograft prepared with bone marrow aspirate concentrate   for reduction of stem cells.  5.  Decompressive laminectomy L3-L4 with complete facetectomies.  6.  Posterolateral arthrodesis, facetectomies and bilateral foraminotomies.  7.  Instrumented stabilization L3 through L5 with use of Solera   instrumentation as placed above and UNiD patient-specific rods.     SURGEON:  Geovany Palafox M.D.      Today she is pleasant and oriented to person and new situation. Pain controlled.    Review of Systems:     Comprehensive 14 point ROS was reviewed and all were negative except as noted elsewhere in this document.     Past Medical History:  Past Medical History:   Diagnosis Date    Anesthesia 11/20/2023    PONV/ pt with history of motion sickness    Arthritis 11/20/2023    low back    Breath shortness 11/20/2023    with exertion    Cancer (HCC) 11/20/2023    basal cell nose    Cataract 11/20/2023    IOL (can't remeber which eye)    Hernia, inguinal, bilateral     Hypertension 11/20/2023    medicated    Pain 11/20/2023    low back pain with left leg involvement    Pneumonia 11/20/2023    history of    PONV (postoperative nausea and vomiting) 11/20/2023    pt with history of motion sickness    Psychiatric problem 11/20/2023    medicated    Urinary bladder disorder 11/20/2023    wears pads for incontinence    Urinary incontinence 11/20/2023    wears pads    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       Past Surgical History:  Past Surgical History:   Procedure Laterality Date    FUSION, SPINE, LUMBAR, ROBOT-ASSISTED WITH IMAGING GUIDANCE N/A 12/14/2023     Procedure: ROBOT ASSISTED POSTERIOR L3-4, L5-S1 LAMINECTOMIES WITH A L3-4 EXTENSION OF FUSION WITH INTERBODY, DURAL TEAR REPAIR;  Surgeon: Geovany Palafox M.D.;  Location: SURGERY [facility];  Service: Neuro Robotic    LUMBAR LAMINECTOMY DISKECTOMY N/A 12/14/2023    Procedure: LAMINECTOMY, SPINE, LUMBAR, WITH DISCECTOMY;  Surgeon: Geovany Palafox M.D.;  Location: SURGERY [facility];  Service: Neuro Robotic    OTHER NEUROLOGICAL SURG  11/20/2023    back fusion L4-5    OTHER  11/20/2023    basal cell removal, nose    OTHER  11/20/2023    IOL 2021    OTHER ABDOMINAL SURGERY  11/20/2023    intestinal obstruction removal    OTHER ABDOMINAL SURGERY  11/20/2023    appendectomy 2008    OTHER ORTHOPEDIC SURGERY Right 11/20/2023    shoulder rotatory cuff repair    OTHER ABDOMINAL SURGERY  11/20/2023    hemorrhoidectomy    OTHER  11/20/2023    cosmetic facial surgery 2010    GYN SURGERY  11/20/2023    hysterectomy with bladder sling 1998    OTHER ABDOMINAL SURGERY  11/20/2023    colonoscopies    RHYTIDECTOMY  12/19/2012    Performed by Christiano Greco M.D. at [facility]    PLATYSMAPLASTY  12/19/2012    Performed by Christiano Greco M.D. at [facility]    FAT TRANSFER  12/19/2012    Performed by Christiano Greco M.D. at [facility]       Family History:  No family history on file.    Medications:  Current Facility-Administered Medications   Medication Dose    Respiratory Therapy Consult      Pharmacy Consult Request ...Pain Management Review 1 Each  1 Each    hydrALAZINE (Apresoline) tablet 25 mg  25 mg    senna-docusate (Pericolace Or Senokot S) 8.6-50 MG per tablet 2 Tablet  2 Tablet    And    polyethylene glycol/lytes (Miralax) Packet 1 Packet  1 Packet    And    magnesium hydroxide (Milk Of Magnesia) suspension 30 mL  30 mL    And    bisacodyl (Dulcolax) suppository 10 mg  10 mg    omeprazole (PriLOSEC) capsule 20 mg  20 mg    ondansetron (Zofran ODT) dispertab 4 mg  4 mg    Or     ondansetron (Zofran) syringe/vial injection 4 mg  4 mg    sodium chloride (Ocean) 0.65 % nasal spray 2 Spray  2 Spray    acetaminophen (Tylenol) tablet 650 mg  650 mg    [START ON 12/20/2023] amLODIPine (Norvasc) tablet 2.5 mg  2.5 mg    calcium carbonate (Tums) chewable tab 500 mg  500 mg    docusate sodium (Colace) capsule 100 mg  100 mg    [START ON 12/20/2023] lisinopril (Prinivil) tablet 10 mg  10 mg    metoprolol tartrate (Lopressor) tablet 25 mg  25 mg    enoxaparin (Lovenox) inj 40 mg  40 mg    methocarbamol (Robaxin) tablet 250 mg  250 mg    oxyCODONE immediate-release (Roxicodone) tablet 2.5 mg  2.5 mg    acetaminophen (Tylenol) tablet 650 mg  650 mg    melatonin tablet 3 mg  3 mg       Allergies:  Patient has no known allergies.    Psychosocial History:  Prior Living Situation:   Housing / Facility: 1 Story House  Steps Into Home: 1  Steps In Home: 0  Lives with - Patient's Self Care Capacity: Alone and Unable to Care For Self  Equipment Owned: Front-Wheel Walker     Prior Level of Function / Living Situation:   Physical Therapy: Prior Services: Home-Independent  Housing / Facility: 1 Story House  Steps Into Home: 1  Steps In Home: 0  Equipment Owned: Front-Wheel Walker  Lives with - Patient's Self Care Capacity: Alone and Unable to Care For Self  Bed Mobility: Independent  Transfer Status: Independent  Ambulation: Independent  Assistive Devices Used: None  Stairs: Independent  Current Level of Function:   Gait Level Of Assist: Minimal Assist  Assistive Device: Front Wheel Walker  Distance (Feet): 50  Deviation: Shuffled Gait, Decreased Base Of Support, Decreased Toe Off, Decreased Heel Strike  Weight Bearing Status: no restrictions  Skilled Intervention: Verbal Cuing, Tactile Cuing, Facilitation, Compensatory Strategies  Supine to Sit: Minimal Assist  Sit to Supine: Moderate Assist  Scooting: Minimal Assist  Rolling: Minimal Assist to Rt.  Skilled Intervention: Verbal Cuing, Facilitation, Compensatory  Strategies  Comments: Required facilitation and cues for maintaining spinal precautions with bed mobility.  Sit to Stand: Minimal Assist  Bed, Chair, Wheelchair Transfer: Minimal Assist  Transfer Method: Stand Step     Occupational Therapy:   Self Feeding: Independent  Grooming / Hygiene: Independent  Bathing: Independent  Dressing: Independent  Toileting: Independent  Prior Services: Home-Independent  Housing / Facility: [address] [place] [address]  Current Level of Function:   Upper Body Dressing: Maximal Assist (don/doff LSO)  Lower Body Dressing: Maximal Assist  Toileting:  (guardado cath, declined need for BM)    CURRENT LEVEL OF FUNCTION:   Same as level of function prior to admission to [facility]    Physical Examination:     VITAL SIGNS:   weight is 63.5 kg (140 lb). Her oral temperature is 37.1 °C (98.8 °F). Her blood pressure is 137/87 and her pulse is 95. Her respiration is 18 and oxygen saturation is 92%.   General: Well developed, Well nourished, No Acute Distress  HEENT: Normocephalic, atraumatic. Anicteric sclera  Cardiac: Physiologic rate and regular rhythm, no murmurs  Pulmonary: Lungs are clear to auscultation bilaterally, comfortable on room air  Abdomen: Soft, non-tender, non-distended. Bowel sounds normoactive.   Extremities: Warm and well perfused, no clubbing, cyanosis. Minimal edema.   Skin: No visible rashes or lesions.   Psych: calm, no agitation, congruent mood/affect    NEUROLOGIC EXAM:  Gen:  Alert and oriented to person, not place. Appropriately interactive  Speech/Language/Comprehension: Fluent speech w/o paraphasic errors, follows simple and complex commands without L/R confusion .?  Attention: Attentive to conversation.   Memory: Recalls her primary reason for being in the hospital  Judgment: Demonstrates appropriate use of call light   Praxis:  Pantomimes brushing hair and teeth.???    Cranial Nerves:   II:   Visual fields full to confrontation. Pupils equally round & reactive to  light. ??  III, IV, VI:  EOMI w/o nystagmus or diplopia. No ptosis.????  V:  Sensation intact to light touch. ??  VII:  Face symmetric without weakness.????  VIII:  Hears functionally.????  IX, X:  Voice normal. Palate elevates symmetrically.????  XI:  Trapezii full.????  XII:  Tongue protrudes midline.????    Motor:?  ?? Delt???? Bi???? Tri???? Wrist ??  Ext?? DIP flex ??  (FDP)?? Finger ABd?? IP???? Quad???? Hamst???? TibAnt???? EHL???? Plantar  flexion   ???? C5???? C6???? C7???? ?C6?? C8/T1?? C8/T1???? L2???? L3???? L4-S1???? L4???? L5???? S1   L???? 5/5 5/5 5/5 5/5 5/5 5/5 4/5 4/5 4/5 4/5 4/5 4/5   R???? 5/5 5/5 5/5 5/5 5/5 5/5 4/5 4/5 4/5 4/5 4/5 4/5       Sensation: decreased sensation in L5, S1 distribution bilaterally      Reflexes: Reflexes are 2+ at the biceps, triceps, brachioradialis, patella, and achilles.           Primary Rehabilitation Diagnosis:    This patient is a 73 y.o. female admitted for acute inpatient rehabilitation with Acute metabolic encephalopathy.    Impairments:   Cognitive  ADLs/IADLs  Mobility  Speech    Secondary Diagnosis/Medical Co-morbidities Affecting Function  Lumbar surgery, acute encephalopathy, HTN    Relevant Changes Since Preadmission Evaluation:    Status unchanged    The patient's rehabilitation potential is Good  The patient's medical prognosis is good    Rehabilitation Plan:   Discussion and Recommendations:   1. The patient requires an acute inpatient rehabilitation program with a coordinated program of care at an intensity and frequency not available at a lower level of care. This recommendation is substantiated by the patient's medical physicians who recommend that the patient's intervention and assessment of medical issues needs to be done at an acute level of care for patient's safety and maximum outcome.   2. A coordinated program of care will be supplied by an interdisciplinary team of physical therapy, occupational therapy, rehab physician, rehab nursing, and,  if needed, speech therapy and rehab psychology. Rehab team presents a patient-specific rehabilitation and education program concentrating on prevention of future problems related to accessibility, mobility, skin, bowel, bladder, sexuality, and psychosocial and medical/surgical problems.   3. Need for Rehabilitation Physician: The rehab physician will be evaluating the patient on a multi-weekly basis to help coordinate the program of care. The rehab physician communicates between medical physicians, therapists, and nurses to maximize the patient's potential outcome. Specific areas in which the rehab physician will be providing daily assessment include the following:   A. Assessing the patient's heart rate and blood pressure response (vitals monitoring) to activity and making adjustments in medications or conservative measures as needed.   B. The rehab physician will be assessing the frequency at which the program can be increased to allow the patient to reach optimal functional outcome.   C. The rehab physician will also provide assessments in daily skin care, especially in light of patient's impairments in mobility.   D. The rehab physician will provide special expertise in understanding how to work with functional impairment and recommend appropriate interventions, compensatory techniques, and education that will facilitate the patient's outcome.   4. Rehab R.N.   The rehab RN will be working with patient to carry over in room mobility and activities of daily living when the patient is not in 3 hours of skilled therapy. Rehab nursing will be working in conjunction with rehab physician to address all the medical issues above and continue to assess laboratory work and discuss abnormalities with the treating physicians, assess vitals, and response to activity, and discuss and report abnormalities with the rehab physician. Rehab RN will also continue daily skin care, supervise bladder/bowel program, instruct in  medication administration, and ensure patient safety.   5. Rehab Therapy: Therapies to treat at intensity and frequency of (may change after completion of evaluation by all therapeutic disciplines):       PT:  Physical therapy to address mobility, transfer, gait training and evaluation for adaptive equipment needs 1 hour/day at least 5 days/week for the duration of the ELOS (see below)       OT:  Occupational therapy to address ADLs, self-care, home management training, functional mobility/transfers and assistive device evaluation, and community re-integration 1  hour/day at least 5 days/week for the duration of the ELOS (see below).        ST/Dysphagia:  Speech therapy to address speech, language, and cognitive deficits as well as swallowing difficulties with retraining/dysphagia management and community re-integration with comprehension, expression, cognitive training 1  hour/day at least 5 days/week for the duration of the ELOS (see below).     Medical management / Rehabilitation Issues/ Adverse Potential as part of rehabilitation plan     Rehabilitation Issues/Adverse Potential  1.  Paraplegia with acute encephalopathy Patient demonstrates functional deficits in strength, balance, coordination, and ADL's. Patient is admitted to [facility] for comprehensive rehabilitation therapy as described below.   Rehabilitation nursing monitors bowel and bladder control, educates on medication administration, co-morbidities and monitors patient safety.      2.  Neurostimulants: None at this time but continue to assess daily for need to initiate should status change.    3.  DVT prophylaxis:  Patient is on Lovenox for anticoagulation upon transfer. Encourage OOB. Monitor daily for signs and symptoms of DVT including but not limited to swelling and pain to prevent the development of DVT that may interfere with therapies.    4.  GI prophylaxis:  On prilosec to prevent gastritis/dyspepsia which may interfere  with therapies.    5.  Pain: No issues with pain currently / Controlled with Tylenol, oxycodone    6.  Nutrition/Dysphagia: Dietician monitors nutrient intake, recommend supplements prn and provide nutrition education to pt/family to promote optimal nutrition for wound healing/recovery.     7.  Bladder/bowel:  Start bowel and bladder program as described below, to prevent constipation, urinary retention (which may lead to UTI), and urinary incontinence (which will impact upon pt's functional independence).   - TV Q3h while awake with post void bladder scans, I&O cath for PVRs >400  - up to commode after meal     8.  Skin/dermal ulcer prophylaxis: Monitor for new skin conditions with q.2 h. turns as required to prevent the development of skin breakdown.     9.  Cognition/Behavior: As needed psychologist provides adjustment counseling to illness and psychosocial barriers that may be potential barriers to rehabilitation.     10. Respiratory therapy: RT performs O2 management prn, breathing retraining, pulmonary hygiene and bronchospasm management prn to optimize participation in therapies.     Medical Co-Morbidities/Adverse Potential Affecting Function:    Paraplegia s/p L3-4 TLIF ext of fusion   -PT/OT    Acute encephalopathy 2/2 surgery and medication  - decrease oxycodone and muscle relaxers  -- SLP to evaluate and treat cognitive deficits.   I certify that the patient currently requires non-pharmacologic restraints. Non-pharmacologic restraints are required to reduce the potential for the patient to harm themselves or others, to limit violent behaviors, and to allow proper assessment and care. I have completed a face to face assessment of this patient on 12/19/2023. Alternative methods including but not limited to de-escalation techniques, redirection, and pharmacologic treatment have been attempted but patient currently remains at risk without restraints. Our staff has been trained on restraints and patient is  currently placed in video monitored room and/or close to the nursing station.  The need for these restraints is assessed by a rehabilitation physician every 24 hours and use of restraints is minimized when appropriate.  Current diagnosis which requires restraints: Acute encephalopathy. Current restraints required: Posey.      HTN  On amlodipine and lisnopril    Neurogenic bladder:  - Timed voids with PVR q4H x3. If PVR > 400mL or if patient is unable to void, straight cath patient.    Neurogenic bowel:  -  Colace, Senna BID on admission  - Goal of 1BM/day.  -   Circadian Rhythm disorder:   -On melatonin prn  Recommend lights on during the day/off at night, minimize nighttime interruptions as able.     Mood  - at risk of adjustment disorder, depression, and anxiety due to functional decline    ID:  - at risk for Urinary tract infection    Skin/Wounds:  - Pressure relief q2h while in bed. Close monitoring for signs of breakdown    Pain:  - Neuroceptic - On Tylenol prn, oxycodone 2.5mg PRN, Robaxin 250mg PRN  -No NSAIDS per surgeon    DVT prophylaxis:  Patient is on Lovenox.     GI prophylaxis:  On Prilosec 20mg daily     -Follow-up Ortho    I personally performed a complete drug regimen review and no potential clinically significant medication issues were identified.     Goals/Expected Level of Function Based on Current Medical and Functional Status:  (may change based on patient's medical status and rate of impairment recovery)  Transfers:   Supervision  Mobility/Gait:   Supervision  ADL's:   Supervision  Cognition:  min cues  Swallowing:  least restrictive diet    DISPOSITION: Discharge to pre-morbid independent living setting with the supportive care of patient's family.    ELOS: 7-14 days  ____________________________________    Randy Mcclain MD  Physical Medicine & Rehabilitation   Brain Injury Medicine   ____________________________________    Pt was seen today for 79 min, and entire time spent in face-to-face  contact was >50% in counseling and coordination of care as detailed in A/P above.

## 2023-12-20 ENCOUNTER — APPOINTMENT (OUTPATIENT)
Dept: PHYSICAL THERAPY | Facility: REHABILITATION | Age: 73
DRG: 052 | End: 2023-12-20
Attending: PHYSICAL MEDICINE & REHABILITATION
Payer: MEDICARE

## 2023-12-20 ENCOUNTER — APPOINTMENT (OUTPATIENT)
Dept: SPEECH THERAPY | Facility: REHABILITATION | Age: 73
DRG: 052 | End: 2023-12-20
Attending: PHYSICAL MEDICINE & REHABILITATION
Payer: MEDICARE

## 2023-12-20 ENCOUNTER — APPOINTMENT (OUTPATIENT)
Dept: OCCUPATIONAL THERAPY | Facility: REHABILITATION | Age: 73
DRG: 052 | End: 2023-12-20
Attending: PHYSICAL MEDICINE & REHABILITATION
Payer: MEDICARE

## 2023-12-20 LAB
ALBUMIN SERPL BCP-MCNC: 3.3 G/DL (ref 3.2–4.9)
ALBUMIN/GLOB SERPL: 1.3 G/DL
ALP SERPL-CCNC: 70 U/L (ref 30–99)
ALT SERPL-CCNC: 11 U/L (ref 2–50)
ANION GAP SERPL CALC-SCNC: 10 MMOL/L (ref 7–16)
AST SERPL-CCNC: 12 U/L (ref 12–45)
BASOPHILS # BLD AUTO: 0.9 % (ref 0–1.8)
BASOPHILS # BLD: 0.06 K/UL (ref 0–0.12)
BILIRUB SERPL-MCNC: 0.7 MG/DL (ref 0.1–1.5)
BUN SERPL-MCNC: 14 MG/DL (ref 8–22)
CALCIUM ALBUM COR SERPL-MCNC: 9.5 MG/DL (ref 8.5–10.5)
CALCIUM SERPL-MCNC: 8.9 MG/DL (ref 8.5–10.5)
CHLORIDE SERPL-SCNC: 98 MMOL/L (ref 96–112)
CO2 SERPL-SCNC: 29 MMOL/L (ref 20–33)
CREAT SERPL-MCNC: 0.5 MG/DL (ref 0.5–1.4)
EOSINOPHIL # BLD AUTO: 0.29 K/UL (ref 0–0.51)
EOSINOPHIL NFR BLD: 4.5 % (ref 0–6.9)
ERYTHROCYTE [DISTWIDTH] IN BLOOD BY AUTOMATED COUNT: 40.3 FL (ref 35.9–50)
GFR SERPLBLD CREATININE-BSD FMLA CKD-EPI: 98 ML/MIN/1.73 M 2
GLOBULIN SER CALC-MCNC: 2.5 G/DL (ref 1.9–3.5)
GLUCOSE SERPL-MCNC: 117 MG/DL (ref 65–99)
HCT VFR BLD AUTO: 36.3 % (ref 37–47)
HGB BLD-MCNC: 13.2 G/DL (ref 12–16)
IMM GRANULOCYTES # BLD AUTO: 0.03 K/UL (ref 0–0.11)
IMM GRANULOCYTES NFR BLD AUTO: 0.5 % (ref 0–0.9)
LYMPHOCYTES # BLD AUTO: 1.29 K/UL (ref 1–4.8)
LYMPHOCYTES NFR BLD: 20.2 % (ref 22–41)
MAGNESIUM SERPL-MCNC: 1.9 MG/DL (ref 1.5–2.5)
MCH RBC QN AUTO: 33.9 PG (ref 27–33)
MCHC RBC AUTO-ENTMCNC: 36.4 G/DL (ref 32.2–35.5)
MCV RBC AUTO: 93.3 FL (ref 81.4–97.8)
MONOCYTES # BLD AUTO: 0.61 K/UL (ref 0–0.85)
MONOCYTES NFR BLD AUTO: 9.6 % (ref 0–13.4)
NEUTROPHILS # BLD AUTO: 4.1 K/UL (ref 1.82–7.42)
NEUTROPHILS NFR BLD: 64.3 % (ref 44–72)
NRBC # BLD AUTO: 0 K/UL
NRBC BLD-RTO: 0 /100 WBC (ref 0–0.2)
PLATELET # BLD AUTO: 365 K/UL (ref 164–446)
PMV BLD AUTO: 9.3 FL (ref 9–12.9)
POTASSIUM SERPL-SCNC: 3 MMOL/L (ref 3.6–5.5)
PROT SERPL-MCNC: 5.8 G/DL (ref 6–8.2)
RBC # BLD AUTO: 3.89 M/UL (ref 4.2–5.4)
SODIUM SERPL-SCNC: 137 MMOL/L (ref 135–145)
WBC # BLD AUTO: 6.4 K/UL (ref 4.8–10.8)

## 2023-12-20 PROCEDURE — 97166 OT EVAL MOD COMPLEX 45 MIN: CPT

## 2023-12-20 PROCEDURE — 92523 SPEECH SOUND LANG COMPREHEN: CPT

## 2023-12-20 PROCEDURE — 700111 HCHG RX REV CODE 636 W/ 250 OVERRIDE (IP): Mod: JZ | Performed by: PHYSICAL MEDICINE & REHABILITATION

## 2023-12-20 PROCEDURE — 97110 THERAPEUTIC EXERCISES: CPT

## 2023-12-20 PROCEDURE — 770010 HCHG ROOM/CARE - REHAB SEMI PRIVAT*

## 2023-12-20 PROCEDURE — 97535 SELF CARE MNGMENT TRAINING: CPT

## 2023-12-20 PROCEDURE — 92610 EVALUATE SWALLOWING FUNCTION: CPT

## 2023-12-20 PROCEDURE — 80053 COMPREHEN METABOLIC PANEL: CPT

## 2023-12-20 PROCEDURE — A9270 NON-COVERED ITEM OR SERVICE: HCPCS | Performed by: PHYSICAL MEDICINE & REHABILITATION

## 2023-12-20 PROCEDURE — 85025 COMPLETE CBC W/AUTO DIFF WBC: CPT

## 2023-12-20 PROCEDURE — 99232 SBSQ HOSP IP/OBS MODERATE 35: CPT | Performed by: PHYSICAL MEDICINE & REHABILITATION

## 2023-12-20 PROCEDURE — 97161 PT EVAL LOW COMPLEX 20 MIN: CPT

## 2023-12-20 PROCEDURE — 36415 COLL VENOUS BLD VENIPUNCTURE: CPT

## 2023-12-20 PROCEDURE — 83735 ASSAY OF MAGNESIUM: CPT

## 2023-12-20 PROCEDURE — 700102 HCHG RX REV CODE 250 W/ 637 OVERRIDE(OP): Performed by: PHYSICAL MEDICINE & REHABILITATION

## 2023-12-20 RX ORDER — LANOLIN ALCOHOL/MO/W.PET/CERES
3 CREAM (GRAM) TOPICAL
Status: DISCONTINUED | OUTPATIENT
Start: 2023-12-20 | End: 2023-12-27 | Stop reason: HOSPADM

## 2023-12-20 RX ORDER — POTASSIUM CHLORIDE 20 MEQ/1
40 TABLET, EXTENDED RELEASE ORAL ONCE
Status: COMPLETED | OUTPATIENT
Start: 2023-12-20 | End: 2023-12-20

## 2023-12-20 RX ADMIN — CALCIUM CARBONATE (ANTACID) CHEW TAB 500 MG 500 MG: 500 CHEW TAB at 20:52

## 2023-12-20 RX ADMIN — AMLODIPINE BESYLATE 2.5 MG: 5 TABLET ORAL at 05:07

## 2023-12-20 RX ADMIN — Medication 3 MG: at 17:28

## 2023-12-20 RX ADMIN — LISINOPRIL 10 MG: 5 TABLET ORAL at 05:07

## 2023-12-20 RX ADMIN — CALCIUM CARBONATE (ANTACID) CHEW TAB 500 MG 500 MG: 500 CHEW TAB at 08:36

## 2023-12-20 RX ADMIN — POTASSIUM CHLORIDE 40 MEQ: 1500 TABLET, EXTENDED RELEASE ORAL at 11:13

## 2023-12-20 RX ADMIN — ENOXAPARIN SODIUM 40 MG: 100 INJECTION SUBCUTANEOUS at 17:28

## 2023-12-20 RX ADMIN — SENNOSIDES AND DOCUSATE SODIUM 2 TABLET: 50; 8.6 TABLET ORAL at 20:52

## 2023-12-20 RX ADMIN — OMEPRAZOLE 20 MG: 20 CAPSULE, DELAYED RELEASE ORAL at 08:36

## 2023-12-20 RX ADMIN — METOPROLOL TARTRATE 25 MG: 25 TABLET, FILM COATED ORAL at 05:07

## 2023-12-20 RX ADMIN — SENNOSIDES AND DOCUSATE SODIUM 2 TABLET: 50; 8.6 TABLET ORAL at 08:36

## 2023-12-20 RX ADMIN — METOPROLOL TARTRATE 25 MG: 25 TABLET, FILM COATED ORAL at 17:28

## 2023-12-20 ASSESSMENT — BRIEF INTERVIEW FOR MENTAL STATUS (BIMS)
ASKED TO RECALL BED: YES, NO CUE REQUIRED
BIMS SUMMARY SCORE: 14
ASKED TO RECALL BLUE: YES, NO CUE REQUIRED
WHAT MONTH IS IT: ACCURATE WITHIN 5 DAYS
INITIAL REPETITION OF BED BLUE SOCK - FIRST ATTEMPT: 3
WHAT DAY OF THE WEEK IS IT: INCORRECT
WHAT YEAR IS IT: CORRECT
ASKED TO RECALL SOCK: YES, NO CUE REQUIRED

## 2023-12-20 ASSESSMENT — PATIENT HEALTH QUESTIONNAIRE - PHQ9
SUM OF ALL RESPONSES TO PHQ9 QUESTIONS 1 AND 2: 0
1. LITTLE INTEREST OR PLEASURE IN DOING THINGS: NOT AT ALL
2. FEELING DOWN, DEPRESSED, IRRITABLE, OR HOPELESS: NOT AT ALL

## 2023-12-20 ASSESSMENT — ACTIVITIES OF DAILY LIVING (ADL)
BED_CHAIR_WHEELCHAIR_TRANSFER_DESCRIPTION: VERBAL CUEING;SET-UP OF EQUIPMENT;INCREASED TIME;SUPERVISION FOR SAFETY
TUB_SHOWER_TRANSFER_DESCRIPTION: GRAB BAR;INCREASED TIME;INITIAL PREPARATION FOR TASK;SET-UP OF EQUIPMENT;SUPERVISION FOR SAFETY;VERBAL CUEING
TOILETING: INDEPENDENT
BED_CHAIR_WHEELCHAIR_TRANSFER_DESCRIPTION: INCREASED TIME;INITIAL PREPARATION FOR TASK;SET-UP OF EQUIPMENT;SQUAT PIVOT TRANSFER TO WHEELCHAIR;SUPERVISION FOR SAFETY;VERBAL CUEING

## 2023-12-20 ASSESSMENT — GAIT ASSESSMENTS
GAIT LEVEL OF ASSIST: CONTACT GUARD ASSIST
DEVIATION: BRADYKINETIC
DISTANCE (FEET): 150

## 2023-12-20 ASSESSMENT — PAIN DESCRIPTION - PAIN TYPE: TYPE: ACUTE PAIN

## 2023-12-20 NOTE — THERAPY
"Occupational Therapy   Initial Evaluation     Patient Name: Rin Yung  Age:  73 y.o., Sex:  female  Medical Record #: [id number]  Today's Date: 12/20/2023     Subjective    Pt was supine in bed upon arrival.     Objective       12/20/23 0701   OT Charge Group   OT Self Care / ADL (Units) 1   OT Evaluation OT Evaluation Mod   OT Total Time Spent   OT Individual Total Time Spent (Mins) 60   Prior Living Situation   Prior Services Home-Independent   Housing / Facility 1 Story House   Steps Into Home 0   Steps In Home 0   Rail None   Elevator No   Bathroom Set up Bathtub / Shower Combination   Equipment Owned Grab Bar(s) In Tub / Shower   Lives with - Patient's Self Care Capacity Alone and Able to Care For Self   Prior Level of ADL Function   Self Feeding Independent   Grooming / Hygiene Independent   Bathing Independent   Dressing Independent   Toileting Independent   Prior Level of IADL Function   Medication Management Independent   Laundry Independent   Kitchen Mobility Independent   Finances Independent   Home Management Independent   Shopping Independent   Prior Level Of Mobility Independent Without Device in Community   Driving / Transportation Driving Independent   Occupation (Pre-Hospital Vocational) Retired Due To Age   Leisure Interests Gardening   Prior Functioning: Everyday Activities   Self Care Independent   Indoor Mobility (Ambulation) Independent   Stairs Independent   Functional Cognition Independent   Prior Device Use None of the given options   Pain   Pain Scales 0 to 10 Scale    Intervention Declines   Pain 0 - 10 Group   Pain Rating Scale (NPRS) 0   Cognitive Pattern Assessment   Cognitive Pattern Assessment Used BIMS   Brief Interview for Mental Status (BIMS)   Repetition of Three Words (First Attempt) 3   Temporal Orientation: Year Correct   Temporal Orientation: Month Accurate within 5 days   Temporal Orientation: Day Incorrect   Recall: \"Sock\" Yes, no cue required   Recall: \"Blue\" Yes, no " "cue required   Recall: \"Bed\" Yes, no cue required   BIMS Summary Score 14   Confusion Assessment Method (CAM)   Inattention Behavior not present   Disorganized thinking Behavior not present   Altered level of consciousness Behavior not present   Vision Screen   Vision Not tested   Active ROM Upper Body   Active ROM Upper Body  WDL   Dominant Hand Right   Strength Upper Body   Upper Body Strength  WDL   Sensation Upper Body   Upper Extremity Sensation  WDL   Upper Body Muscle Tone   Upper Body Muscle Tone  WDL   Balance Assessment   Sitting Balance (Static) Good   Sitting Balance (Dynamic) Fair   Standing Balance (Static) Fair -   Bed Mobility    Supine to Sit Standby Assist   Sit to Stand Contact Guard Assist   Scooting Standby Assist   Coordination Upper Body   Coordination WDL   Oral Hygiene   Assistance Needed Supervision   Physical Assistance Level No physical assistance   CARE Score - Oral Hygiene 4   Oral Hygiene Discharge Goal   Discharge Goal 6   Shower/Bathe Self   Assistance Needed Physical assistance   Physical Assistance Level 25% or less   CARE Score - Shower/Bathe Self 3   Shower/Bathe Self Discharge Goal   Discharge Goal 6   Upper Body Dressing   Assistance Needed Physical assistance   Physical Assistance Level 26%-50%   CARE Score - Upper Body Dressing 3   Upper Body Dressing Discharge Goal   Discharge Goal 6   Lower Body Dressing   Assistance Needed Physical assistance   Physical Assistance Level 51%-75%   CARE Score - Lower Body Dressing 2   Lower Body Dressing Discharge Goal   Discharge Goal 6   Putting On/Taking Off Footwear   Assistance Needed Physical assistance   Physical Assistance Level 76% or more   CARE Score - Putting On/Taking Off Footwear 2   Putting On/Taking Off Footwear Discharge Goal   Discharge Goal 6   Toileting Hygiene   Assistance Needed Physical assistance   Physical Assistance Level 26%-50%   CARE Score - Toileting Hygiene 3   Toileting Hygiene Discharge Goal   Discharge Goal " 6   Toilet Transfer   Assistance Needed Physical assistance   Physical Assistance Level 25% or less   CARE Score - Toilet Transfer 3   Toilet Transfer Discharge Goal   Discharge Goal 6   Hearing, Speech, and Vision   Ability to Hear Adequate   Ability to See in Adequate Light Adequate   Expression of Ideas and Wants Without difficulty   Understanding Verbal and Non-Verbal Content Understands   Functional Level of Assist   Grooming Standby Assist   Grooming Description Seated in wheelchair at sink;Set-up of equipment;Increased time;Initial preparation for task;Supervision for safety   Bathing Minimal Assist   Bathing Description Grab bar;Hand held shower;Tub bench;Assit wtih lower extremities;Increased time;Initial preparation for task;Set-up of equipment;Supervision for safety   Upper Body Dressing Moderate Assist   Upper Body Dressing Description Application of orthotic or brace;Increased time;Initial preparation for task;Set-up of equipment;Supervision for safety   Lower Body Dressing Maximal Assist   Lower Body Dressing Description Assist with threading into pant leg;Cues for spinal precautions;Increased time;Initial preparation for task;Set-up of equipment;Supervision for safety;Verbal cueing   Toileting Moderate Assist   Toileting Description Increased time;Initial preparation for task;Set-up of equipment;Supervision for safety;Assist to pull pants down   Bed, Chair, Wheelchair Transfer Minimal Assist   Bed Chair Wheelchair Transfer Description Increased time;Initial preparation for task;Set-up of equipment;Squat pivot transfer to wheelchair;Supervision for safety;Verbal cueing   Toilet Transfers Minimal Assist   Toilet Transfer Description Grab bar;Increased time;Initial preparation for task;Supervision for safety;Verbal cues for spinal precautions   Tub / Shower Transfers Minimal Assist   Tub Shower Transfer Description Grab bar;Increased time;Initial preparation for task;Set-up of equipment;Supervision for  safety;Verbal cueing   Problem List   Problem List Decreased Active Daily Living Skills;Decreased Homemaking Skills;Decreased Functional Mobility;Decreased Activity Tolerance   Precautions   Precautions Fall Risk;TLSO (Thoracolumbosacral orthosis);Spinal / Back Precautions    Current Discharge Plan   Current Discharge Plan Return to Prior Living Situation   Benefit    Therapy Benefit Patient Would Benefit from Inpatient Rehab Occupational Therapy to Maximize [place] with ADLs, IADLs and Functional Mobility.   Interdisciplinary Plan of Care Collaboration   IDT Collaboration with  Nursing   Patient Position at End of Therapy Seated;Chair Alarm On;Self Releasing Lap Belt Applied;Call Light within Reach  (Pt taken to dining room for breakfast meal.)   OT DME Recommendations   Bathroom Equipment 3 in 1 Commode   Strengths & Barriers   Strengths Able to follow instructions;Independent prior level of function;Motivated for self care and independence;Pleasant and cooperative;Supportive family   Barriers Decreased endurance;Fatigue;Generalized weakness;Impaired activity tolerance;Limited mobility       Assessment  Patient is 73 y.o. female s/p elective L3-S1 lami with L3-L4 fusion and dural repair. Per son, she apparently has been struggling at home for the past year with her cognition, and believes she may have undiagnosed dementia. She has been sundowning and getting agitated in the afternoons/evenings while admitted. She was confused throughout the session, difficult to understand due to nonsensical speech, had difficulty sequencing tasks, and had a difficult time retaining new information. Strong posterior lean during seated/standing activities. Additional factors influencing patient status / progress: weakness, fatigue, impaired balance, impaired cognition.     Additional factors influencing patient status / progress (ie: cognitive factors, co-morbidities, social support, etc).      Plan  Recommend Occupational  Therapy  30/60/90  minutes per day 5-7 days per week for 7-14 days for the following treatments:  OT Self Care/ADL, OT Community Reintegration, OT Therapeutic Activity, OT Evaluation, and OT Therapeutic Exercise.    Passport items to be completed:  Perform bathroom transfers, complete dressing, complete feeding, get ready for the day, prepare a simple meal, participate in household tasks, adapt home for safety needs, demonstrate home exercise program, complete caregiver training     Goals:  Long term and short term goals have been discussed with patient and they are in agreement.    Occupational Therapy Goals (Active)       Problem: Bathing       Dates: Start:  12/20/23         Goal: STG-Within one week, patient will bathe with SBA.       Dates: Start:  12/20/23               Problem: Dressing       Dates: Start:  12/20/23         Goal: STG-Within one week, patient will dress UB with Min A.       Dates: Start:  12/20/23            Goal: STG-Within one week, patient will dress LB with Min A.       Dates: Start:  12/20/23               Problem: Functional Transfers       Dates: Start:  12/20/23         Goal: STG-Within one week, patient will transfer to toilet with SBA.       Dates: Start:  12/20/23            Goal: STG-Within one week, patient will transfer to step in shower with SBA.       Dates: Start:  12/20/23               Problem: IADL's       Dates: Start:  12/20/23         Goal: STG-Within one week, patient will prepare a meal with CGA.       Dates: Start:  12/20/23               Problem: OT Long Term Goals       Dates: Start:  12/20/23         Goal: LTG-By discharge, patient will complete basic self care tasks with Mod Independent level.       Dates: Start:  12/20/23            Goal: LTG-By discharge, patient will perform bathroom transfers with Mod Independent level.       Dates: Start:  12/20/23

## 2023-12-20 NOTE — PROGRESS NOTES
Physical Medicine & Rehabilitation Progress Note    Encounter Date: 12/20/2023    Chief Complaint: Weakness    Interval Events (Subjective):  Seen in chair. Continues to be impulsive and requiring posey bed at night. No pain.    Objective:  VITAL SIGNS: BP (!) 139/98 Comment: RnNotifed  Pulse (!) 111 Comment: Rn Notifed  Temp 37.3 °C (99.1 °F) (Oral)   Resp 17   Wt 63.5 kg (140 lb)   SpO2 94%   BMI 24.03 kg/m²   Gen: No acute distress, well developed well nourished adult  HEENT: Normal Cephalic Atraumatic, Normal conjunctiva.   CV: warm extremities, well perfused, no edema  Resp: symmetric chest rise, breathing comfortably on room air  Abd: Soft, Non distended  Extremities: normal bulk, no atrophy  Skin: no visible rashes or lesions.   Neuro: alert, awake  Psych: Mood and affect appropriate and congruent    Laboratory Values:  Recent Results (from the past 72 hour(s))   CBC with Differential    Collection Time: 12/20/23  5:43 AM   Result Value Ref Range    WBC 6.4 4.8 - 10.8 K/uL    RBC 3.89 (L) 4.20 - 5.40 M/uL    Hemoglobin 13.2 12.0 - 16.0 g/dL    Hematocrit 36.3 (L) 37.0 - 47.0 %    MCV 93.3 81.4 - 97.8 fL    MCH 33.9 (H) 27.0 - 33.0 pg    MCHC 36.4 (H) 32.2 - 35.5 g/dL    RDW 40.3 35.9 - 50.0 fL    Platelet Count 365 164 - 446 K/uL    MPV 9.3 9.0 - 12.9 fL    Neutrophils-Polys 64.30 44.00 - 72.00 %    Lymphocytes 20.20 (L) 22.00 - 41.00 %    Monocytes 9.60 0.00 - 13.40 %    Eosinophils 4.50 0.00 - 6.90 %    Basophils 0.90 0.00 - 1.80 %    Immature Granulocytes 0.50 0.00 - 0.90 %    Nucleated RBC 0.00 0.00 - 0.20 /100 WBC    Neutrophils (Absolute) 4.10 1.82 - 7.42 K/uL    Lymphs (Absolute) 1.29 1.00 - 4.80 K/uL    Monos (Absolute) 0.61 0.00 - 0.85 K/uL    Eos (Absolute) 0.29 0.00 - 0.51 K/uL    Baso (Absolute) 0.06 0.00 - 0.12 K/uL    Immature Granulocytes (abs) 0.03 0.00 - 0.11 K/uL    NRBC (Absolute) 0.00 K/uL   Comp Metabolic Panel (CMP)    Collection Time: 12/20/23  5:43 AM   Result Value Ref Range     Sodium 137 135 - 145 mmol/L    Potassium 3.0 (L) 3.6 - 5.5 mmol/L    Chloride 98 96 - 112 mmol/L    Co2 29 20 - 33 mmol/L    Anion Gap 10.0 7.0 - 16.0    Glucose 117 (H) 65 - 99 mg/dL    Bun 14 8 - 22 mg/dL    Creatinine 0.50 0.50 - 1.40 mg/dL    Calcium 8.9 8.5 - 10.5 mg/dL    Correct Calcium 9.5 8.5 - 10.5 mg/dL    AST(SGOT) 12 12 - 45 U/L    ALT(SGPT) 11 2 - 50 U/L    Alkaline Phosphatase 70 30 - 99 U/L    Total Bilirubin 0.7 0.1 - 1.5 mg/dL    Albumin 3.3 3.2 - 4.9 g/dL    Total Protein 5.8 (L) 6.0 - 8.2 g/dL    Globulin 2.5 1.9 - 3.5 g/dL    A-G Ratio 1.3 g/dL   Magnesium    Collection Time: 12/20/23  5:43 AM   Result Value Ref Range    Magnesium 1.9 1.5 - 2.5 mg/dL   ESTIMATED GFR    Collection Time: 12/20/23  5:43 AM   Result Value Ref Range    GFR (CKD-EPI) 98 >60 mL/min/1.73 m 2       Medications:  Scheduled Medications   Medication Dose Frequency    Pharmacy Consult Request  1 Each PHARMACY TO DOSE    senna-docusate  2 Tablet BID    omeprazole  20 mg DAILY    amLODIPine  2.5 mg DAILY    calcium carbonate  500 mg BID    lisinopril  10 mg DAILY    metoprolol tartrate  25 mg BID    enoxaparin (LOVENOX) injection  40 mg DAILY AT 1800     PRN medications: Respiratory Therapy Consult, hydrALAZINE, senna-docusate **AND** polyethylene glycol/lytes **AND** magnesium hydroxide **AND** bisacodyl, ondansetron **OR** ondansetron, sodium chloride, methocarbamol, oxyCODONE immediate-release, acetaminophen, melatonin    Diet:  Current Diet Order   Procedures    Diet Order Diet: Regular       Medical Decision Making and Plan:  Paraplegia s/p L3-4 TLIF ext of fusion   -PT/OT     Acute encephalopathy 2/2 surgery and medication  - decrease oxycodone and muscle relaxers  -- SLP to evaluate and treat cognitive deficits.   I certify that the patient currently requires non-pharmacologic restraints. Non-pharmacologic restraints are required to reduce the potential for the patient to harm themselves or others, to limit violent  behaviors, and to allow proper assessment and care. I have completed a face to face assessment of this patient on 12/20/2023. Alternative methods including but not limited to de-escalation techniques, redirection, and pharmacologic treatment have been attempted but patient currently remains at risk without restraints. Our staff has been trained on restraints and patient is currently placed in video monitored room and/or close to the nursing station.  The need for these restraints is assessed by a rehabilitation physician every 24 hours and use of restraints is minimized when appropriate.  Current diagnosis which requires restraints: TBI. Current restraints required: posey.      HTN  On amlodipine and lisnopril     Neurogenic bladder:  - Timed voids with PVR q4H x3. If PVR > 400mL or if patient is unable to void, straight cath patient.     Neurogenic bowel:  -  Colace, Senna BID on admission  - Goal of 1BM/day.  -   Circadian Rhythm disorder:   -On melatonin prn  -12/20- scheduled after dinner  Recommend lights on during the day/off at night, minimize nighttime interruptions as able.     Mood  - at risk of adjustment disorder, depression, and anxiety due to functional decline     ID:  - at risk for Urinary tract infection     Skin/Wounds:  - Pressure relief q2h while in bed. Close monitoring for signs of breakdown     Pain:  - Neuroceptic - On Tylenol prn, oxycodone 2.5mg PRN, Robaxin 250mg PRN  -No NSAIDS per surgeon     DVT prophylaxis:  continue lovenox     GI prophylaxis:  On Prilosec 20mg daily      -Follow-up Ortho    ____________________________________    Randy Mcclain MD  Physical Medicine & Rehabilitation   Brain Injury Medicine   ____________________________________

## 2023-12-20 NOTE — CARE PLAN
"The patient is Watcher - Medium risk of patient condition declining or worsening    Shift Goals  Clinical Goals: Safety    Progress made toward(s) clinical / shift goals:    Problem: Fall Risk - Rehab  Goal: Patient will remain free from falls  Outcome: Progressing  Note: Marlene Stratton Fall risk Assessment Score: 25    High fall risk Interventions   - Alarming seatbelt  - SOMA bed  - Bed and strip alarm   - Yellow sign by the door   - Yellow wrist band \"Fall risk\"  - Room near to the nurse station  - Do not leave patient unattended in the bathroom  - Fall risk education provided       Problem: Respiratory  Goal: Patient will understand use and administration of respiratory medications to improve respiratory function  Outcome: Progressing     Problem: Bladder / Voiding  Goal: Patient will establish and maintain regular urinary output  Note: Patient had episodes of incontinence. Time voided.            "

## 2023-12-20 NOTE — CARE PLAN
The patient is Stable - Low risk of patient condition declining or worsening    Shift Goals  Clinical Goals: safety  Patient Goals: pain management      Problem: Mobility  Goal: Patient's capacity to carry out activities will improve  Outcome: Progressing patient has been up participating in therapies, educated to take rest periods in between to avoid fatigue.     Problem: Fall Risk - Rehab  Goal: Patient will remain free from falls  Outcome: Not Met patient has soma bed, will trial open side today/tonight.

## 2023-12-20 NOTE — CARE PLAN
Problem: Balance  Goal: STG-Within one week, patient will maintain dynamic standing  Description: In parallel bars on varying surfaces 1min x 4 with SPV  Note:  parallel bars on varying surfaces to challenge balance 1min x 4 with SPV

## 2023-12-20 NOTE — THERAPY
Physical Therapy   Initial Evaluation     Patient Name: Rin Yung  Age:  73 y.o., Sex:  female  Medical Record #: [id number]  Today's Date: 12/20/2023     Subjective    Patient motivated for independence     Objective       12/20/23 1031   PT Charge Group   PT Therapeutic Exercise (Units) 1   PT Evaluation PT Evaluation Low   PT Total Time Spent   PT Individual Total Time Spent (Mins) 60   Prior Living Situation   Prior Services Home-Independent   Housing / Facility 1 [address] House   Steps Into Home 1   Rail None   Equipment Owned Front-Wheel Walker   Lives with - Patient's Self Care Capacity Alone and Able to Care For Self   Comments reports 1 fall in previous 6months   Prior Functioning: Everyday Activities   Indoor Mobility (Ambulation) Independent   Stairs Independent   Prior Device Use None of the given options   Pain 0 - 10 Group   Therapist Pain Assessment Prior to Activity;During Activity;0   Strength Lower Body   Lower Body Strength  WDL   Comments   (right LE stronger than left)   Balance Assessment   Weight Shift Standing Good   Bed Mobility    Supine to Sit Standby Assist   Sit to Supine Standby Assist   Sit to Stand Standby Assist   Roll Left and Right   Assistance Needed Verbal cues;Supervision;Set-up / clean-up;Adaptive equipment   CARE Score - Roll Left and Right 4   Roll Left and Right Discharge Goal   Discharge Goal 6   Sit to Lying   Assistance Needed Verbal cues;Supervision;Set-up / clean-up;Adaptive equipment   CARE Score - Sit to Lying 4   Sit to Lying Discharge Goal   Discharge Goal 6   Lying to Sitting on Side of Bed   Assistance Needed Verbal cues;Supervision;Set-up / clean-up;Adaptive equipment   CARE Score - Lying to Sitting on Side of Bed 4   Lying to Sitting on Side of Bed Discharge Goal   Discharge Goal 6   Sit to Stand   Assistance Needed Incidental touching;Verbal cues;Supervision;Set-up / clean-up;Adaptive equipment   CARE Score - Sit to Stand 4   Sit to Stand Discharge Goal    Discharge Goal 6   Chair/Bed-to-Chair Transfer   Assistance Needed Incidental touching;Verbal cues;Set-up / clean-up;Supervision;Adaptive equipment   CARE Score - Chair/Bed-to-Chair Transfer 4   Chair/Bed-to-Chair Transfer Discharge Goal   Discharge Goal 6   Car Transfer   Reason if not Attempted Safety concerns   CARE Score - Car Transfer 88   Car Transfer Discharge Goal   Discharge Goal 6   Walk 10 Feet   Assistance Needed Incidental touching;Verbal cues;Set-up / clean-up;Supervision   CARE Score - Walk 10 Feet 4   Walk 10 Feet Discharge Goal   Discharge Goal 6   Walk 50 Feet with Two Turns   Assistance Needed Incidental touching;Verbal cues;Supervision;Set-up / clean-up   CARE Score - Walk 50 Feet with Two Turns 4   Walk 50 Feet with Two Turns Discharge Goal   Discharge Goal 6   Walk 150 Feet   Assistance Needed Incidental touching;Verbal cues;Supervision;Set-up / clean-up   CARE Score - Walk 150 Feet 4   Walk 150 Feet Discharge Goal   Discharge Goal 6   Walking 10 Feet on Uneven Surfaces   Reason if not Attempted Safety concerns   CARE Score - Walking 10 Feet on Uneven Surfaces 88   Walking 10 Feet on Uneven Surfaces Discharge Goal   Discharge Goal 4   1 Step (Curb)   Reason if not Attempted Safety concerns   CARE Score - 1 Step (Curb) 88   1 Step (Curb) Discharge Goal   Discharge Goal 6   4 Steps   Reason if not Attempted Activity not applicable   CARE Score - 4 Steps 9   4 Steps Discharge Goal   Discharge Goal 9   12 Steps   Reason if not Attempted Activity not applicable   CARE Score - 12 Steps 9   12 Steps Discharge Goal   Discharge Goal 9   Picking Up Object   Reason if not Attempted Safety concerns   CARE Score - Picking Up Object 88   Picking Up Object Discharge Goal   Discharge Goal 6   Wheel 50 Feet with Two Turns   Reason if not Attempted Activity not applicable   CARE Score - Wheel 50 Feet with Two Turns 9   Type of Wheelchair/Scooter Manual   Wheel 50 Feet with Two Turns Discharge Goal   Discharge  Goal 9   Wheel 150 Feet   Reason if not Attempted Activity not applicable   CARE Score - Wheel 150 Feet 9   Type of Wheelchair/Scooter Manual   Wheel 150 Feet Discharge Goal   Discharge Goal 9   Gait Functional Level of Assist    Gait Level Of Assist Contact Guard Assist   Assistive Device None   Distance (Feet) 150   # of Times Distance was Traveled 1   Deviation Bradykinetic   Wheelchair Functional Level of Assist   Wheelchair Assist Stand by Assist   Distance Wheelchair (Feet or Distance) 100   Wheelchair Description   (bilateral UE, verbal cues for steering)   Stairs Functional Level of Assist   Level of Assist with Stairs Unable to Participate   Transfer Functional Level of Assist   Bed, Chair, Wheelchair Transfer Contact Guard Assist   Bed Chair Wheelchair Transfer Description Verbal cueing;Set-up of equipment;Increased time;Supervision for safety   Problem List    Problems Impaired Ambulation;Decreased Activity Tolerance;Safety Awareness Deficits / Cognition   Precautions   Precautions Fall Risk;Able to Complete Non Weighted Activities of Daily Living;Spinal / Back Precautions ;TLSO (Thoracolumbosacral orthosis)   Current Discharge Plan   Current Discharge Plan Return to Prior Living Situation   Interdisciplinary Plan of Care Collaboration   IDT Collaboration with  Occupational Therapist;Physical Therapist Assistant (PTA)   Collaboration Comments baseline eval scores   Benefit   Therapy Benefit Patient Would Benefit from Inpatient Rehabilitation Physical Therapy to Maximize Functional [place] with ADLs, IADLs and Mobility.   Strengths & Barriers   Strengths Motivated for self care and independence;Willingly participates in therapeutic activities;Adequate strength   Barriers Impaired balance;Impaired activity tolerance;Decreased endurance;Generalized weakness;Fatigue;Limited mobility       Assessment  Patient is 73 y.o. female with a diagnosis of acute metabolic encephalopathy.  Additional factors  influencing patient status / progress (ie: cognitive factors, co-morbidities, social support, etc): lives alone, son concerned about recent confusion episodes.      Plan  Recommend Physical Therapy  minutes per day 5-7 days per week for 7-14 days for the following treatments:  PT Gait Training, PT Therapeutic Exercises, PT Neuro Re-Ed/Balance, PT Therapeutic Activity, and PT Evaluation.    Passport items to be completed:  Get in/out of bed safely, in/out of a vehicle, safely use mobility device, walk or wheel around home/community, navigate up and down stairs, show how to get up/down from the ground, ensure home is accessible, demonstrate HEP, complete caregiver training    Goals:  Long term and short term goals have been discussed with patient and they are in agreement.    Physical Therapy Problems (Active)       Problem: Balance       Dates: Start:  12/20/23         Goal: STG-Within one week, patient will maintain dynamic standing       Dates: Start:  12/20/23       Description: In parallel bars on varying surfaces 1min x 4 with SPV      Goal Note filed on 12/20/23 7504 by Angelica Cardoso MSPT        parallel bars on varying surfaces to challenge balance 1min x 4 with SPV                 Problem: Mobility       Dates: Start:  12/20/23         Goal: STG-Within one week, patient will ambulate household distance no AD SPV        Dates: Start:  12/20/23            Goal: STG-Within one week, patient will ambulate up/down a curb with no AD CGA       Dates: Start:  12/20/23               Problem: Mobility Transfers       Dates: Start:  12/20/23         Goal: STG-Within one week, patient will transfer bed to chair SBA SPT       Dates: Start:  12/20/23               Problem: PT-Long Term Goals       Dates: Start:  12/20/23         Goal: LTG-By discharge, patient will ambulate 50ft x 4 with no AD mod I       Dates: Start:  12/20/23            Goal: LTG-By discharge, patient will transfer one surface to  another mod I SPT safely and consistently       Dates: Start:  12/20/23            Goal: LTG-By discharge, patient will perform home exercise program seated/standing there-ex for LE strengthening to be done 3x/day in safe, independent home environment       Dates: Start:  12/20/23            Goal: LTG-By discharge, patient will up/down threshold step for home entrance with no AD SPV       Dates: Start:  12/20/23

## 2023-12-20 NOTE — CARE PLAN
Problem: Knowledge Deficit - Standard  Goal: Patient and family/care givers will demonstrate understanding of plan of care, disease process/condition, diagnostic tests and medications  Outcome: Progressing   Pt education given regarding plan of care with emphasis on adequate hydration, pt shows some understanding, will continue to reinforce education and continue to monitor.         Problem: Fall Risk - Rehab  Goal: Patient will remain free from falls  Outcome: Progressing   Pt education given regarding fall precautions AND safety measures, pt shows good understanding, has not attempted to self transfer this shift, will continue to reinforce education and continue to monitor.

## 2023-12-20 NOTE — THERAPY
"Speech Language Pathology   Initial Assessment     Patient Name: Rin Yung  AGE:  73 y.o., SEX:  female  Medical Record #: [id number]  Today's Date: 12/20/2023     Subjective    Per H&P \"Patient is 73 y.o. female s/p elective L3-S1 lami with L3-L4 fusion and dural repair. Per son, she apparently has been struggling at home for the past year with her cognition, and believes she may have undiagnosed dementia. She has been sundowning and getting agitated in the afternoons/evenings while admitted. She was confused throughout the session, difficult to understand due to nonsensical speech, had difficulty sequencing tasks, and had a difficult time retaining new information. Strong posterior lean during seated/standing activities. Additional factors influencing patient status / progress: weakness, fatigue, impaired balance, impaired cognition.\"     Objective       12/20/23 0801   Evaluation Charges   Charges Yes   SLP Speech Language Evaluation Speech Sound Language Comprehension   SLP Oral Pharyngeal Evaluation Oral Pharyngeal Evaluation   SLP Total Time Spent   SLP Individual Total Time Spent (Mins) 60   Precautions   Precautions Fall Risk;Able to Complete Non Weighted Activities of Daily Living;Spinal / Back Precautions ;TLSO (Thoracolumbosacral orthosis)   Comments baseline cognitive deficits   Prior Living Situation   Prior Services Home-Independent   Housing / Facility [address]   Prior Level Of Function   Communication Impaired  (baseline cognitive deficits with possibly undiagnosed dementia)   Swallow Within Functional Limits   Hearing Within Functional Limits for Evaluation   Hearing Aid Right;Left  (has them at home but does not wear them)   Vision Wears Corrective Lenses;Reading    Patient's Primary Language English   Occupation (Pre-Hospital Vocational) Retired Due To Age   Receptive Language / Auditory Comprehension   Receptive Language / Auditory Comprehension WDL   Expressive Language   Expressive " Language (WDL) WDL   Written Language Expression   Written Language Expression (WDL) X   Dominant Hand Right   Spontaneous Word Level Minimal (4)   Functional Writing (Name, Address) Minimal (4)   Cognition   Cognitive-Linguistic (WDL) X   Simple Attention Minimal (4)   Moderate Attention Moderate (3)   Functional Memory Activities Moderate (3)   Simple Reasoning / Problem Solving Moderate (3)   Insight into Deficits Minimal (4)   Executive Functioning / Organization To Be Assessed   Written Sequencing Moderate (3)   Medication Management  To Be Assessed   Clock Drawing Disorganization;Impaired Hand Placement;Omissions;Poor Planning   Confusion Assessment Method (CAM)   Is there evidence of an acute change in mental status from the patient's baseline? Yes   Inattention Behavior present, fluctuates (comes and goes, changes in severity)   Disorganized thinking Behavior present, fluctuates (comes and goes, changes in severity)   Altered level of consciousness Behavior not present   Social / Pragmatic Communication   Social / Pragmatic Communication WDL   Tracheostomy   Tracheostomy No   Speech Mechanisms / Voice Production   Speech Mechanisms / Voice Production (WDL) WDL   Labial Function   Labial Function (WDL) WDL   Lingual Function   Lingual Function (WDL) WDL   Jaw Function   Jaw Function (WDL) WDL   Velar Function   Velar Function (WDL) WDL   Laryngeal Function   Laryngeal Function (WDL) WDL   Swallowing   Swallowing (WDL) WDL   Dysphagia Strategies / Recommendations   Strategies / Interventions Recommended (Yes / No) No   Barriers To Oral Feeding   Barriers To Oral Feeding None   Functional Level of Assist   Eating Independent   Comprehension Modified Independent   Comprehension Description Glasses   Expression Independent   Social Interaction Independent   Problem Solving Moderate Assist   Problem Solving Description Verbal cueing;Therapy schedule;Seat belt;Increased time;Bed/chair alarm   Memory Moderate Assist    Memory Description Verbal cueing;Therapy schedule;Seat belt;Increased time;Bed/chair alarm   Outcome Measures   Outcome Measures Utilized SCCAN   SCCAN (Scales of Cognitive and Communicative Ability for Neurorehabilitation)   Oral Expression - Raw Score 17   Oral Expression - Scale Performance Score 89   Orientation - Raw Score 12   Orientation - Scale Performance Score 100   Memory - Raw Score 13   Memory - Scale Performance Score 68   Speech Comprehension - Raw Score 12   Speech Comprehension - Scale Performance Score 92   Problem List   Problem List Cognitive-Linguistic Deficits;Attention Deficit;Memory Deficit;Verbal Problem Solving Deficits   Current Discharge Plan   Current Discharge Plan Unknown at this Time   Benefit   Therapy Benefit Patient would benefit from Inpatient Rehab Speech-Language Pathology to address above identified deficits.   Strengths & Barriers   Strengths Alert and oriented;Motivated for self care and independence;Pleasant and cooperative;Supportive family;Willingly participates in therapeutic activities   Barriers Impaired carryover of learning;Impaired functional cognition   Speech Language Pathologist Assigned   Assigned SLP / Treatment Time / Comments CW 60 cog       Assessment    Patient is 73 y.o. female with a diagnosis of acute metabolic encephalopathy s/p elective L3-S1 lami with L3-L4 fusion and dural repair.  Additional factors influencing patient status/progress (ie: cognitive factors, co-morbidities, social support, etc): baseline cognitive deficits (possibly undiagnosed dementia per chart review), lives alone, pleasant and cooperative, sundowning and confusion at night, family support.      Clinical swallow evaluation completed.  Pt was admitted on a 7- Regular Texture diet and 0-Thin liquids and reported no difficulty swallowing.  Pt consumed breakfast without s/sx of aspiration noted.  Pt was also able to tolerate whole medications with a thin liquid wash without  difficulty.  Oral [facility] exam found all oral structures WFL's in terms of strength and ROM.  No further ST warranted for dysphagia management.     Cognitive evaluation initiated this session using the SCCAN; however due to time constraints it was not completed this session.  Pt demonstrated mild-moderate memory impairments (memory- 68%), but was oriented and able to follow simple directions.  Pt reported that she has noticed her cognition steadily declining for the last few years, but stated that she lives alone and manages her medication and finances without difficulty.  Pt also reported that she still drives.  Recommend completing administration of the SCCAN with scores to be reported when completed.  Pt would benefit from ST to address medication management, financial management tasks, and functional memory.       Plan  Recommend Speech Therapy 30-60 minutes per day 5-6 days per week for 7-10 days for the following treatments:  SLP Speech Language Treatment, SLP Self Care / ADL Training , SLP Cognitive Skill Development, and SLP Group Treatment.    Passport items to be completed:  Express basic needs, understand food/liquid recommendations, consistently follow swallow precautions, manage finances, manage medications, arrive to therapy appointments on time, complete daily memory log entries, solve problems related to safety situations, review education related to hospitalization, complete caregiver training     Goals:  Long term and short term goals have been discussed with patient and they are in agreement.    Speech Therapy Problems (Active)       Problem: Memory STGs       Dates: Start:  12/20/23         Goal: STG-Within one week, patient will recall new information related to her current hospitalization with hamzah CASS[facility]         Dates: Start:  12/20/23               Problem: Problem Solving STGs       Dates: Start:  12/20/23         Goal: STG-Within one week, patient will complete medication management tasks given min  A to achieve 80% accuracy.        Dates: Start:  12/20/23            Goal: STG-Within one week, patient will complete administration of the SCCAN.         Dates: Start:  12/20/23               Problem: Speech/Swallowing LTGs       Dates: Start:  12/20/23         Goal: LTG-By discharge, patient will solve complex problems with spv.        Dates: Start:  12/20/23

## 2023-12-20 NOTE — PROGRESS NOTES
NURSING DAILY NOTE    Name: Rin Yung   Date of Admission: 12/19/2023   Admitting Diagnosis: Acute metabolic encephalopathy  Attending Physician: Randy Mcclain M.d.  Allergies: Patient has no allergy information on record.    Safety  Patient Assist     Patient Precautions     Precaution Comments     Bed Transfer Status     Toilet Transfer Status      Assistive Devices  Wheelchair  Oxygen  None - Room Air  Diet/Therapeutic Dining  Current Diet Order   Procedures    Diet Order Diet: Regular     Pill Administration  whole and one at a time   Agitated Behavioral Scale     ABS Level of Severity       Fall Risk  Has the patient had a fall this admission?   No  Marlene Stratton Fall Risk Scoring  25, HIGH RISK  Fall Risk Safety Measures  bed alarm, chair alarm, seatbelt alarm, and posey bed     Vitals  Temperature: 37.4 °C (99.4 °F)  Temp src: Oral  Pulse: 88  Respiration: 18  Blood Pressure : (!) 146/102  Blood Pressure MAP (Calculated): 117 MM HG  BP Location: Left, Upper Arm  Patient BP Position: Supine     Oxygen  Pulse Oximetry: 92 %  O2 (LPM): 0  O2 Delivery Device: None - Room Air    Bowel and Bladder  Last Bowel Movement  12/17/23  Stool Type     Bowel Device     Continent  Bladder: Continent void   Bowel: Continent movement  Bladder Function  Urine Void (mL):  (lsrge)  Number of Times Voided: 1  Urine Color: Yellow  Genitourinary Assessment   Bladder Assessment (WDL):  Within Defined Limits  Urine Color: Yellow  Time Void: Yes  Bladder Scan: Post Void  $ Bladder Scan Results (mL): 1    Skin  Michael Score   17  Sensory Interventions   Bed Types: Standard/Trauma Mattress  Skin Preventative Measures: Pillows in Use for Support / Positioning  Moisture Interventions         Pain  Pain Rating Scale  0 - No Pain  Pain Location     Pain Location Orientation     Pain Interventions        ADLs    Bathing      Linen Change      Personal Hygiene     Chlorhexidine  Bath      Oral Care     Teeth/Dentures     Shave     Nutrition Percentage Eaten  Dinner, Between % Consumed  Environmental Precautions     Patient Turns/Positioning  Patient Turns Self from Side to Side  Patient Turns Assistance/Tolerance     Bed Positions     Head of Bed Elevated         Psychosocial/Neurologic Assessment  Psychosocial Assessment  Psychosocial (WDL):  WDL Except  Patient Behaviors: Confused  Neurologic Assessment  Level of Consciousness: Alert  EENT (WDL):  WDL Except    Cardio/Pulmonary Assessment  Edema      Respiratory Breath Sounds  RUL Breath Sounds: Clear  RML Breath Sounds: Clear  RLL Breath Sounds: Diminished  ADOLFO Breath Sounds: Clear  LLL Breath Sounds: Diminished  Cardiac Assessment   Cardiac (WDL):  WDL Except (Cardiac Meds)

## 2023-12-21 ENCOUNTER — APPOINTMENT (OUTPATIENT)
Dept: SPEECH THERAPY | Facility: REHABILITATION | Age: 73
DRG: 052 | End: 2023-12-21
Attending: PHYSICAL MEDICINE & REHABILITATION
Payer: MEDICARE

## 2023-12-21 ENCOUNTER — APPOINTMENT (OUTPATIENT)
Dept: OCCUPATIONAL THERAPY | Facility: REHABILITATION | Age: 73
DRG: 052 | End: 2023-12-21
Attending: PHYSICAL MEDICINE & REHABILITATION
Payer: MEDICARE

## 2023-12-21 ENCOUNTER — APPOINTMENT (OUTPATIENT)
Dept: PHYSICAL THERAPY | Facility: REHABILITATION | Age: 73
DRG: 052 | End: 2023-12-21
Attending: PHYSICAL MEDICINE & REHABILITATION
Payer: MEDICARE

## 2023-12-21 LAB
ANION GAP SERPL CALC-SCNC: 11 MMOL/L (ref 7–16)
BUN SERPL-MCNC: 13 MG/DL (ref 8–22)
CALCIUM SERPL-MCNC: 8.9 MG/DL (ref 8.5–10.5)
CHLORIDE SERPL-SCNC: 101 MMOL/L (ref 96–112)
CO2 SERPL-SCNC: 29 MMOL/L (ref 20–33)
CREAT SERPL-MCNC: 0.54 MG/DL (ref 0.5–1.4)
GFR SERPLBLD CREATININE-BSD FMLA CKD-EPI: 96 ML/MIN/1.73 M 2
GLUCOSE SERPL-MCNC: 115 MG/DL (ref 65–99)
POTASSIUM SERPL-SCNC: 3.8 MMOL/L (ref 3.6–5.5)
SODIUM SERPL-SCNC: 141 MMOL/L (ref 135–145)

## 2023-12-21 PROCEDURE — 700102 HCHG RX REV CODE 250 W/ 637 OVERRIDE(OP): Performed by: PHYSICAL MEDICINE & REHABILITATION

## 2023-12-21 PROCEDURE — A9270 NON-COVERED ITEM OR SERVICE: HCPCS | Performed by: PHYSICAL MEDICINE & REHABILITATION

## 2023-12-21 PROCEDURE — 97130 THER IVNTJ EA ADDL 15 MIN: CPT

## 2023-12-21 PROCEDURE — 97530 THERAPEUTIC ACTIVITIES: CPT

## 2023-12-21 PROCEDURE — 97535 SELF CARE MNGMENT TRAINING: CPT

## 2023-12-21 PROCEDURE — 80048 BASIC METABOLIC PNL TOTAL CA: CPT

## 2023-12-21 PROCEDURE — 770010 HCHG ROOM/CARE - REHAB SEMI PRIVAT*

## 2023-12-21 PROCEDURE — 700111 HCHG RX REV CODE 636 W/ 250 OVERRIDE (IP): Mod: JZ | Performed by: PHYSICAL MEDICINE & REHABILITATION

## 2023-12-21 PROCEDURE — 97129 THER IVNTJ 1ST 15 MIN: CPT

## 2023-12-21 PROCEDURE — 36415 COLL VENOUS BLD VENIPUNCTURE: CPT

## 2023-12-21 PROCEDURE — 97116 GAIT TRAINING THERAPY: CPT

## 2023-12-21 PROCEDURE — 97110 THERAPEUTIC EXERCISES: CPT

## 2023-12-21 PROCEDURE — 99233 SBSQ HOSP IP/OBS HIGH 50: CPT | Performed by: PHYSICAL MEDICINE & REHABILITATION

## 2023-12-21 RX ADMIN — CALCIUM CARBONATE (ANTACID) CHEW TAB 500 MG 500 MG: 500 CHEW TAB at 08:24

## 2023-12-21 RX ADMIN — AMLODIPINE BESYLATE 2.5 MG: 5 TABLET ORAL at 05:30

## 2023-12-21 RX ADMIN — Medication 3 MG: at 17:34

## 2023-12-21 RX ADMIN — OMEPRAZOLE 20 MG: 20 CAPSULE, DELAYED RELEASE ORAL at 08:24

## 2023-12-21 RX ADMIN — METOPROLOL TARTRATE 25 MG: 25 TABLET, FILM COATED ORAL at 17:33

## 2023-12-21 RX ADMIN — METOPROLOL TARTRATE 25 MG: 25 TABLET, FILM COATED ORAL at 05:30

## 2023-12-21 RX ADMIN — LISINOPRIL 10 MG: 5 TABLET ORAL at 05:30

## 2023-12-21 RX ADMIN — ENOXAPARIN SODIUM 40 MG: 100 INJECTION SUBCUTANEOUS at 17:33

## 2023-12-21 RX ADMIN — CALCIUM CARBONATE (ANTACID) CHEW TAB 500 MG 500 MG: 500 CHEW TAB at 20:24

## 2023-12-21 SDOH — ECONOMIC STABILITY: TRANSPORTATION INSECURITY
IN THE PAST 12 MONTHS, HAS LACK OF RELIABLE TRANSPORTATION KEPT YOU FROM MEDICAL APPOINTMENTS, MEETINGS, WORK OR FROM GETTING THINGS NEEDED FOR DAILY LIVING?: NO

## 2023-12-21 SDOH — ECONOMIC STABILITY: TRANSPORTATION INSECURITY
IN THE PAST 12 MONTHS, HAS THE LACK OF TRANSPORTATION KEPT YOU FROM MEDICAL APPOINTMENTS OR FROM GETTING MEDICATIONS?: NO

## 2023-12-21 ASSESSMENT — ACTIVITIES OF DAILY LIVING (ADL)
BED_CHAIR_WHEELCHAIR_TRANSFER_DESCRIPTION: VERBAL CUEING;SUPERVISION FOR SAFETY;INCREASED TIME
BED_CHAIR_WHEELCHAIR_TRANSFER_DESCRIPTION: ADAPTIVE EQUIPMENT;VERBAL CUEING;SUPERVISION FOR SAFETY

## 2023-12-21 ASSESSMENT — GAIT ASSESSMENTS
DEVIATION: BRADYKINETIC;DECREASED HEEL STRIKE;DECREASED TOE OFF
DISTANCE (FEET): 200
ASSISTIVE DEVICE: FRONT WHEEL WALKER
GAIT LEVEL OF ASSIST: CONTACT GUARD ASSIST

## 2023-12-21 ASSESSMENT — PATIENT HEALTH QUESTIONNAIRE - PHQ9
2. FEELING DOWN, DEPRESSED, IRRITABLE, OR HOPELESS: NOT AT ALL
SUM OF ALL RESPONSES TO PHQ9 QUESTIONS 1 AND 2: 0
1. LITTLE INTEREST OR PLEASURE IN DOING THINGS: NOT AT ALL

## 2023-12-21 ASSESSMENT — PAIN DESCRIPTION - PAIN TYPE: TYPE: ACUTE PAIN

## 2023-12-21 NOTE — THERAPY
Physical Therapy   Daily Treatment     Patient Name: Rin Yung  Age:  73 y.o., Sex:  female  Medical Record #: [id number]  Today's Date: 12/21/2023     Precautions  Precautions: Fall Risk, Able to Complete Non Weighted Activities of Daily Living, Spinal / Back Precautions , TLSO (Thoracolumbosacral orthosis)  Comments: baseline cognitive deficits    Subjective    Agreeable to participate, late entry for 12/21/23 due to incomplete note     Objective       12/21/23 0831   PT Charge Group   PT Therapeutic Exercise (Units) 1   PT Therapeutic Activities (Units) 1   PT Total Time Spent   PT Individual Total Time Spent (Mins) 30   Transfer Functional Level of Assist   Bed, Chair, Wheelchair Transfer Contact Guard Assist   Bed Chair Wheelchair Transfer Description Verbal cueing;Supervision for safety;Increased time   Toilet Transfers Contact Guard Assist   Toilet Transfer Description Grab bar;Increased time;Verbal cueing;Supervision for safety;Initial preparation for task   Sitting Lower Body Exercises   Nustep Resistance Level 5  (5 min 275 steps)   Interdisciplinary Plan of Care Collaboration   IDT Collaboration with  Occupational Therapist   Patient Position at End of Therapy Seated;Chair Alarm On;Self Releasing Lap Belt Applied;Call Light within Reach;Tray Table within Reach;Phone within Reach   Collaboration Comments CLOF   PT DME Recommendations   Wheelchair   (no AD anticipated)         Assessment    Patient demonstrates limited activity tolerance, will benefit from further cardiovascular endurance and strengthening training.     Strengths: Motivated for self care and independence, Willingly participates in therapeutic activities, Adequate strength  Barriers: Impaired balance, Impaired activity tolerance, Decreased endurance, Generalized weakness, Fatigue, Limited mobility    Plan    Balance, strength, cardiovasc endurance    DME  PT DME Recommendations  Wheelchair: (P)  (no AD anticipated)    Passport items to  be completed:  Get in/out of bed safely, in/out of a vehicle, safely use mobility device, walk or wheel around home/community, navigate up and down stairs, show how to get up/down from the ground, ensure home is accessible, demonstrate HEP, complete caregiver training    Physical Therapy Problems (Active)       Problem: Balance       Dates: Start:  12/20/23         Goal: STG-Within one week, patient will maintain dynamic standing       Dates: Start:  12/20/23    Expected End:  01/12/24       Description: In parallel bars on varying surfaces 1min x 4 with SPV      Goal Note filed on 12/20/23 1444 by Angelica Cardoso, MSPT        parallel bars on varying surfaces to challenge balance 1min x 4 with SPV                 Problem: Mobility       Dates: Start:  12/20/23         Goal: STG-Within one week, patient will ambulate household distance no AD SPV        Dates: Start:  12/20/23    Expected End:  01/12/24            Goal: STG-Within one week, patient will ambulate up/down a curb with no AD CGA       Dates: Start:  12/20/23    Expected End:  01/12/24               Problem: PT-Long Term Goals       Dates: Start:  12/20/23         Goal: LTG-By discharge, patient will ambulate 50ft x 4 with no AD mod I       Dates: Start:  12/20/23    Expected End:  01/12/24            Goal: LTG-By discharge, patient will transfer one surface to another mod I SPT safely and consistently       Dates: Start:  12/20/23    Expected End:  01/12/24            Goal: LTG-By discharge, patient will perform home exercise program seated/standing there-ex for LE strengthening to be done 3x/day in safe, independent home environment       Dates: Start:  12/20/23    Expected End:  01/12/24            Goal: LTG-By discharge, patient will up/down threshold step for home entrance with no AD SPV       Dates: Start:  12/20/23    Expected End:  01/12/24

## 2023-12-21 NOTE — DISCHARGE PLANNING
CM spoke with patients son Alin to update on IDT and DC date of 12/27/23.  Answered questions.  Explained the need for 24/7.  Alin stated his aunt will be there to stay with patient as long as she's needed.  CM will continue to monitor for DC needs.

## 2023-12-21 NOTE — CARE PLAN
Problem: Balance  Goal: STG-Within one week, patient will maintain dynamic standing  Description: In parallel bars on varying surfaces 1min x 4 with SPV  Outcome: Not Met     Problem: Mobility  Goal: STG-Within one week, patient will ambulate household distance no AD SPV   Outcome: Not Met  Goal: STG-Within one week, patient will ambulate up/down a curb with no AD CGA  Outcome: Not Met     Problem: Mobility Transfers  Goal: STG-Within one week, patient will transfer bed to chair SBA SPT  Outcome: Met

## 2023-12-21 NOTE — THERAPY
Speech Language Pathology  Daily Treatment     Patient Name: Rin Yung  Age:  73 y.o., Sex:  female  Medical Record #: [id number]  Today's Date: 12/21/2023     Precautions  Precautions: Fall Risk, Able to Complete Non Weighted Activities of Daily Living, Spinal / Back Precautions , TLSO (Thoracolumbosacral orthosis)  Comments: baseline cognitive deficits    Subjective    Patient pleasant and agreeable to SLP services.     Objective       12/21/23 0901   Treatment Charges   Charges Yes   SLP Cognitive Skill Development First 15 Minutes 1   SLP Cognitive Skill Development Additional 15 Minutes 3   SLP Total Time Spent   SLP Individual Total Time Spent (Mins) 60   Precautions   Precautions Fall Risk;Able to Complete Non Weighted Activities of Daily Living;Spinal / Back Precautions ;TLSO (Thoracolumbosacral orthosis)   Comments baseline cognitive deficits   SCCAN (Scales of Cognitive and Communicative Ability for Neurorehabilitation)   Oral Expression - Raw Score 17   Oral Expression - Scale Performance Score 89   Orientation - Raw Score 12   Orientation - Scale Performance Score 100   Memory - Raw Score 13   Memory - Scale Performance Score 68   Speech Comprehension - Raw Score 12   Speech Comprehension - Scale Performance Score 92   Reading Comprehension - Raw Score 8   Reading Comprehension - Scale Performance Score 67   Writing - Raw Score 6   Writing - Scale Performance Score 86   Attention - Raw Score 6   Attention - Scale Performance Score 38   Problem Solving - Raw Score 16   Problem Solving - Scale Performance Score 70   SCCAN Total Raw Score 74   SCCAN Degree of Severity Mild Impairment   Interdisciplinary Plan of Care Collaboration   Patient Position at End of Therapy Seated;Chair Alarm On;Call Light within Reach;Tray Table within Reach;Phone within Reach       Assessment    Completed the SCCAN as initial assessment this session.    The Scales of Cognitive and Communicative Ability for  Neurorehabilitation (SCCAN) assesses cognitive-communicative deficits and functional ability in patients in rehabilitation hospitals, clinics, and skilled nursing facilities. The SCCAN is appropriate for a broad range of neurological patients, provides a measure of both impairment and functional ability.     SCCAN (Scales of Cognitive and Communicative Ability for Neurorehabilitation)  Oral Expression - Raw Score: 17  Oral Expression - Scale Performance Score: 89  Orientation - Raw Score: 12  Orientation - Scale Performance Score: 100  Memory - Raw Score: 13  Memory - Scale Performance Score: 68  Speech Comprehension - Raw Score: 12  Speech Comprehension - Scale Performance Score: 92  Reading Comprehension - Raw Score: 8  Reading Comprehension - Scale Performance Score: 67  Writing - Raw Score: 6  Writing - Scale Performance Score: 86  Attention - Raw Score: 6  Attention - Scale Performance Score: 38  Problem Solving - Raw Score: 16  Problem Solving - Scale Performance Score: 70  SCCAN Total Raw Score: 74  SCCAN Degree of Severity: Mild Impairment    The patient expressed throughout the session that she noticed changes in her cognition before hospitalization; recalled bookkeeping expenses on a trip she took with her sister and found the task to be challenging. She reports that she has a system for financial management at baseline, however, verbalized understanding of how her cognitive deficits may affect these tasks. She requested to work with SLP on financial management because she greatly values her independence with her finances.    Strengths: Alert and oriented, Motivated for self care and independence, Pleasant and cooperative, Supportive family, Willingly participates in therapeutic activities  Barriers: Impaired carryover of learning, Impaired functional cognition    Plan    Continue to address medication, financial management.    Passport items to be completed:  Express basic needs, understand food/liquid  recommendations, consistently follow swallow precautions, manage finances, manage medications, arrive to therapy appointments on time, complete daily memory log entries, solve problems related to safety situations, review education related to hospitalization, complete caregiver training     Speech Therapy Problems (Active)       Problem: Memory STGs       Dates: Start:  12/20/23         Goal: STG-Within one week, patient will recall new information related to her current hospitalization with hamzah ANTONY.         Dates: Start:  12/20/23    Expected End:  01/12/24         Goal Note filed on 12/21/23 0805 by Pilo Dominguez MS,CCC-SLP       Pt evaluated yesterday, continue to target                  Problem: Problem Solving STGs       Dates: Start:  12/20/23         Goal: STG-Within one week, patient will complete medication management tasks given min A to achieve 80% accuracy.        Dates: Start:  12/20/23    Expected End:  01/12/24         Goal Note filed on 12/21/23 0805 by Pilo Dominguez MS,CCC-SLP       Pt evaluated yesterday, continue to target               Goal: STG-Within one week, patient will complete administration of the SCCAN.         Dates: Start:  12/20/23    Expected End:  01/12/24         Goal Note filed on 12/21/23 0805 by Pilo Dominguez MS,CCC-SLP       Pt evaluated yesterday, continue to target                  Problem: Speech/Swallowing LTGs       Dates: Start:  12/20/23         Goal: LTG-By discharge, patient will solve complex problems with spv.        Dates: Start:  12/20/23    Expected End:  01/12/24

## 2023-12-21 NOTE — OP REPORT
DATE OF SERVICE:  12/14/2023     PREOPERATIVE DIAGNOSES:  1.  Status post lumbar laminectomy and fusion, L4-L5.  2.  Severe lumbar stenosis at L3-L4 with neurogenic claudication.  3.  Degenerative changes at L5-S1, mild to moderate.     POSTOPERATIVE DIAGNOSES:  1.  Status post lumbar laminectomy and fusion, L4-L5.  2.  Severe lumbar stenosis at L3-L4 with neurogenic claudication.  3.  Degenerative changes at L5-S1, mild to moderate.     PROCEDURES:  1.  Mazor, robotic-assisted placement of pedicle screws at L3, L4, L5   bilaterally, navigated with 6.5 x 45 mm screws placed at L3, 6.5 x 45 mm   screws placed at L4 and 6.5 x 45 mm screws placed at L5.  2.  Removal of hardware L4-L5 with replacement as stated above.  3.  Interbody fusion L3-L4 with use of 9 x 29.6 Catalyft cage, filled with   locally harvested bone graft and augmented anteriorly with OsteoAMP fibers.  4.  Posterolateral arthrodesis with the use of locally harvested bone graft,   OsteoAMP fibers and allograft prepared with bone marrow aspirate concentrate   for reduction of stem cells.  5.  Decompressive laminectomy L3-L4 with complete facetectomies.  6.  Posterolateral arthrodesis, facetectomies and bilateral foraminotomies.  7.  Instrumented stabilization L3 through L5 with use of Solera   instrumentation as placed above and UNiD patient-specific rods.     SURGEON:  Geovany Palafox M.D.     ASSISTANT:  LICO Moctezuma     ANESTHESIA:  General anesthetic.     ANESTHETIST:  Randy Johns M.D.     PREPARATION:  The patient had somatosensory evoked potentials, motor evokes,   and EMGs placed.     DESCRIPTION OF PROCEDURE:  The patient was brought to the operating room and   following induction, patient was intubated and placed under general   anesthetic.  Lopez catheter was sterilely placed and she was prepared for   somatosensory evoked potentials, motor evokes, and EMGs.  Rolled prone onto   the operating table using chest, hip, thigh pads,  all pressure points were   padded, sequential stockings were in place.  Back was prepped and draped in a   sterile fashion and timeout was performed.  We injected the incision with 30   mL of Marcaine 0.25% with epinephrine, made a midline incision and did a   subperiosteal dissection over L3 and over the instrumentation at L4-L5.  We   dissected over the transverse process of L3, and over the transverse process   of L4 and L5, decorticated this area and obtained meticulous hemostasis.  At   this time, we placed a posterior superior iliac Jamshidi needle on the right   and obtained 60 mL of blood.  With [employer], the bone marrow aspirate   was concentrated and the stem cells were removed and used to reconstitute 30   mL of allograft chips.  The Jamshidi needle had been removed and the stem cell   were being prepared.  At this time, we placed a PSIS pin and then attached to   robotic arm in the usual fashion.  Flyover with 3D rendering was obtained,   followed by the snapshot tracker to start navigation, followed by the AP and   oblique films taken with the target extender over the operative field and   subsequent merging of CT and fluoroscopic images.     With robotic arm and guidance, we were able to dock into the facet joints at   L3 bilaterally, drilled down the axis of the pedicle with the navigated Midas   Giorgio through the outer cannula, which was in the robotic arm.  Through this, we   drilled, tapped, and then placed the 6.5 x 45 mm navigated screws.  We had   excellent purchase, and at this time, we removed the set screws at L4 and L5,   followed by removal of the rosanna, followed by removal of the pedicle screws.    Solid fusion was here, but we were able to use navigation, to place a 6.5 x 45   mm screws replacing the Globus ClickX screws that were removed.  The bone   marrow aspirate had been mixed with the bone graft for about an hour and this   was put into the posterolateral gutters after mixing  this together.  This was   done at L3-L4 and L4-L5.     At this time, we removed the lamina of L3.  We drilled the junction of lamina   facet to a thin shelf, removed the center portion of the bone.  We drilled   through the pars interarticularis and superior facet with a Midas Giorgio using   the AM-8 and removed the inferior facet and subsequently the superior facet.    We completely decompressed around the pedicle of L3, and dissected the scar   tissue away from L4 and decompressed here.  Coming to the top of L5, we   decompressed around the L5, and did not find any stenosis inferiorly, so I did   not take this decompression inferiorly.     Drawing the thecal sac medially on the right hand side, got a small dural   opening, which was repaired with 8-0 Pearsall-Magnus as it was a very small pinhole.    Retracting thecal sac medially without any CSF leak, we opened the disk space   and then prepared the endplates with paddle rachael starting with a #7 and   moving to a #9.  Up and downbiting curettes were used to remove degenerative   disk material.  OsteoAMP fibers were placed anteriorly followed by the   Catalyft cage 9 x 29.6, tamped anteriorly under navigated guidance and was   opened obtaining good lordotic curvature and apposition to the endplates.  We   were able to pack into the cage through a funnel and the introducer of the   cage locally harvested bone graft from the [employer] bone press.     We placed approximately 10 mL here.     At this time, the introducer was removed.  Area was copiously irrigated.  No   CSF was found.  We placed a small piece of Duragen over the repair.  The UNiD   rods had been pre-prepared and were placed into the multiaxial heads.    Setscrews were secured.  We had good alignment on AP and lateral films and all   instrumentation was in good position.  We were especially happy with the   interbody cage, with this anterior apposition to cortical bone and again   correction of the flat back,  with more lordosis obtained at this level.     We checked all nerve roots and found it to be free.  We obtained meticulous   hemostasis after irrigating and then placed 2 grams of vancomycin, a medium   size Hemovac.  Fascia was closed with #1 Vicryl suture, subcutaneous tissue   with 2-0 Vicryl, subcuticular layer and skin with staples.  All sponge and   needle counts correct.  Estimated blood loss was approximately 500 mL with 250   mL of Cell Saver given back.        ______________________________  MD ARTEMIO CHURCH/BIN/TMA    DD:  12/15/2023 17:04  DT:  12/15/2023 19:43    Job#:  [id number]

## 2023-12-21 NOTE — THERAPY
Physical Therapy   Daily Treatment     Patient Name: Rin Yung  Age:  73 y.o., Sex:  female  Medical Record #: [id number]  Today's Date: 12/21/2023     Precautions  Precautions: Fall Risk, Able to Complete Non Weighted Activities of Daily Living, Spinal / Back Precautions , TLSO (Thoracolumbosacral orthosis)  Comments: baseline cognitive deficits    Subjective    Patient is found seated up in chair eating breakfast, is agreeable to participate when she's done eating.      Objective       12/21/23 0831   PT Charge Group   PT Therapeutic Exercise (Units) 1   PT Therapeutic Activities (Units) 1   PT Total Time Spent   PT Individual Total Time Spent (Mins) 30   Transfer Functional Level of Assist   Bed, Chair, Wheelchair Transfer Contact Guard Assist   Bed Chair Wheelchair Transfer Description Verbal cueing;Supervision for safety;Increased time   Toilet Transfers Contact Guard Assist   Toilet Transfer Description Grab bar;Increased time;Verbal cueing;Supervision for safety;Initial preparation for task   Sitting Lower Body Exercises   Nustep Resistance Level 5  (5 min 275 steps)   Interdisciplinary Plan of Care Collaboration   IDT Collaboration with  Occupational Therapist   Patient Position at End of Therapy Seated;Chair Alarm On;Self Releasing Lap Belt Applied;Call Light within Reach;Tray Table within Reach;Phone within Reach   Collaboration Comments CLOF   PT DME Recommendations   Wheelchair   (no AD anticipated)         Assessment    Patient demonstrates some impulsivity and limited carryover with toilet transfers and safety awareness. Did not address gait this morning due to limited time due to eating breakfast and getting dressed.     Strengths: Motivated for self care and independence, Willingly participates in therapeutic activities, Adequate strength  Barriers: Impaired balance, Impaired activity tolerance, Decreased endurance, Generalized weakness, Fatigue, Limited mobility    Plan    Balance, gait, safety  awareness, cardiovascular endurance    DME  PT DME Recommendations  Wheelchair: (P)  (no AD anticipated)    Passport items to be completed:  Get in/out of bed safely, in/out of a vehicle, safely use mobility device, walk or wheel around home/community, navigate up and down stairs, show how to get up/down from the ground, ensure home is accessible, demonstrate HEP, complete caregiver training    Physical Therapy Problems (Active)       Problem: Balance       Dates: Start:  12/20/23         Goal: STG-Within one week, patient will maintain dynamic standing       Dates: Start:  12/20/23    Expected End:  01/12/24       Description: In parallel bars on varying surfaces 1min x 4 with SPV      Goal Note filed on 12/20/23 1444 by Angelica Cardoso MSPT        parallel bars on varying surfaces to challenge balance 1min x 4 with SPV                 Problem: Mobility       Dates: Start:  12/20/23         Goal: STG-Within one week, patient will ambulate household distance no AD SPV        Dates: Start:  12/20/23    Expected End:  01/12/24            Goal: STG-Within one week, patient will ambulate up/down a curb with no AD CGA       Dates: Start:  12/20/23    Expected End:  01/12/24               Problem: PT-Long Term Goals       Dates: Start:  12/20/23         Goal: LTG-By discharge, patient will ambulate 50ft x 4 with no AD mod I       Dates: Start:  12/20/23    Expected End:  01/12/24            Goal: LTG-By discharge, patient will transfer one surface to another mod I SPT safely and consistently       Dates: Start:  12/20/23    Expected End:  01/12/24            Goal: LTG-By discharge, patient will perform home exercise program seated/standing there-ex for LE strengthening to be done 3x/day in safe, independent home environment       Dates: Start:  12/20/23    Expected End:  01/12/24            Goal: LTG-By discharge, patient will up/down threshold step for home entrance with no AD SPV       Dates: Start:  12/20/23     Expected End:  01/12/24

## 2023-12-21 NOTE — CARE PLAN
"The patient is Stable - Low risk of patient condition declining or worsening    Shift Goals  Clinical Goals: safety  Patient Goals: pain management    P  Problem: Skin Integrity  Goal: Skin integrity is maintained or improved  Outcome: Progressing  Note:   Michael Score: 17    Patient's skin remains intact and free from new or accidental injury this shift; no s/s of infection. RN wound protocol checked.      Problem: Fall Risk - Rehab  Goal: Patient will remain free from falls  Outcome: Progressing  Note: Mralene Stratton Fall risk Assessment Score: 22    High fall risk Interventions   - Alarming seatbelt  - Soma Bed and strip alarm   - Yellow sign by the door   - Yellow wrist band \"Fall risk\"  - Room near to the nurse station  - Do not leave patient unattended in the bathroom  - Fall risk education provided    Patient is on open bed trial and was able to call appropriately. She did not attempt to transfer by herself. Will continue to monitor.     "

## 2023-12-21 NOTE — THERAPY
Physical Therapy   Daily Treatment     Patient Name: Rin Yung  Age:  73 y.o., Sex:  female  Medical Record #: [id number]  Today's Date: 12/21/2023     Precautions  Precautions: Fall Risk, Able to Complete Non Weighted Activities of Daily Living, Spinal / Back Precautions , TLSO (Thoracolumbosacral orthosis)  Comments: baseline cognitive deficits    Subjective    Pt received on her wc and agreeable to participate.     Objective       12/21/23 1101   PT Charge Group   Charges Yes   PT Gait Training (Units) 1   PT Therapeutic Exercise (Units) 1   PT Total Time Spent   PT Individual Total Time Spent (Mins) 30   Precautions   Precautions Fall Risk;Able to Complete Non Weighted Activities of Daily Living;Spinal / Back Precautions ;TLSO (Thoracolumbosacral orthosis)   Comments baseline cognitive deficits   Cognition    Level of Consciousness Alert   Sleep/Wake Cycle   Sleep & Rest Awake   Gait Functional Level of Assist    Gait Level Of Assist Contact Guard Assist   Assistive Device Front Wheel Walker   Distance (Feet) 200  (75 feet for 2nd bout)   # of Times Distance was Traveled 1   Deviation Bradykinetic;Decreased Heel Strike;Decreased Toe Off   Transfer Functional Level of Assist   Bed, Chair, Wheelchair Transfer Contact Guard Assist   Bed Chair Wheelchair Transfer Description Adaptive equipment;Verbal cueing;Supervision for safety   Sitting Lower Body Exercises   Hip Abduction 2 sets of 10;Medium Resistance Theraband   Long Arc Quad 2 sets of 10;Weight (See Comments for lbs)  (4lb AW)   Marching 2 sets of 10  (4lbs AW)   Bed Mobility    Supine to Sit Standby Assist   Sit to Supine Standby Assist   Sit to Stand Standby Assist   Scooting Standby Assist   Interdisciplinary Plan of Care Collaboration   Patient Position at End of Therapy In Bed;Call Light within Reach;Bed Alarm On;Phone within Reach  (posey bed zipped)   Physical Therapist Assigned   Assigned PT / Treatment Time / Comments 30     Performed seated  resistance exercises without fatigue. Completed gait training using FWW, CGA.    Assessment    She demonstrated steady dynamic balance during gait training. She is receptive in instructions and follows appropriately.   Strengths: Motivated for self care and independence, Willingly participates in therapeutic activities, Adequate strength  Barriers: Impaired balance, Impaired activity tolerance, Decreased endurance, Generalized weakness, Fatigue, Limited mobility    Plan    Recommend Physical Therapy  minutes per day 5-7 days per week for 7-14 days for the following treatments:  PT Gait Training, PT Therapeutic Exercises, PT Neuro Re-Ed/Balance and  PT Therapeutic Activity,      DME  PT DME Recommendations  Wheelchair:  (no AD anticipated)    Passport items to be completed:  Get in/out of bed safely, in/out of a vehicle, safely use mobility device, walk or wheel around home/community, navigate up and down stairs, show how to get up/down from the ground, ensure home is accessible, demonstrate HEP, complete caregiver training    Physical Therapy Problems (Active)       Problem: Balance       Dates: Start:  12/20/23         Goal: STG-Within one week, patient will maintain dynamic standing       Dates: Start:  12/20/23    Expected End:  01/12/24       Description: In parallel bars on varying surfaces 1min x 4 with SPV      Goal Note filed on 12/20/23 1444 by Angelica Cardoso MSPT        parallel bars on varying surfaces to challenge balance 1min x 4 with SPV                 Problem: Mobility       Dates: Start:  12/20/23         Goal: STG-Within one week, patient will ambulate household distance no AD SPV        Dates: Start:  12/20/23    Expected End:  01/12/24            Goal: STG-Within one week, patient will ambulate up/down a curb with no AD CGA       Dates: Start:  12/20/23    Expected End:  01/12/24               Problem: PT-Long Term Goals       Dates: Start:  12/20/23         Goal: LTG-By  discharge, patient will ambulate 50ft x 4 with no AD mod I       Dates: Start:  12/20/23    Expected End:  01/12/24            Goal: LTG-By discharge, patient will transfer one surface to another mod I SPT safely and consistently       Dates: Start:  12/20/23    Expected End:  01/12/24            Goal: LTG-By discharge, patient will perform home exercise program seated/standing there-ex for LE strengthening to be done 3x/day in safe, independent home environment       Dates: Start:  12/20/23    Expected End:  01/12/24            Goal: LTG-By discharge, patient will up/down threshold step for home entrance with no AD SPV       Dates: Start:  12/20/23    Expected End:  01/12/24

## 2023-12-21 NOTE — CARE PLAN
Problem: Problem Solving STGs  Goal: STG-Within one week, patient will complete medication management tasks given min A to achieve 80% accuracy.   Outcome: Not Met  Note: Pt evaluated yesterday, continue to target   Goal: STG-Within one week, patient will complete administration of the SCCAN.    Outcome: Not Met  Note: Pt evaluated yesterday, continue to target      Problem: Memory STGs  Goal: STG-Within one week, patient will recall new information related to her current hospitalization with min A.    Outcome: Not Met  Note: Pt evaluated yesterday, continue to target

## 2023-12-21 NOTE — PROGRESS NOTES
NURSING DAILY NOTE    Name: Rin Yung   Date of Admission: 12/19/2023   Admitting Diagnosis: Acute metabolic encephalopathy  Attending Physician: Randy Mcclain M.d.  Allergies: Patient has no allergy information on record.    Safety  Patient Assist  CGA  Patient Precautions  Fall Risk, Able to Complete Non Weighted Activities of Daily Living, Spinal / Back Precautions , TLSO (Thoracolumbosacral orthosis)  Precaution Comments  baseline cognitive deficits  Bed Transfer Status  Contact Guard Assist  Toilet Transfer Status   Minimal Assist  Assistive Devices  Rails, Wheelchair  Oxygen  None - Room Air  Diet/Therapeutic Dining  Current Diet Order   Procedures    Diet Order Diet: Regular     Pill Administration  whole  Agitated Behavioral Scale     ABS Level of Severity       Fall Risk  Has the patient had a fall this admission?   No  Marlene Stratton Fall Risk Scoring  22, HIGH RISK  Fall Risk Safety Measures  bed alarm and chair alarm    Vitals  Temperature: 36.5 °C (97.7 °F)  Temp src: Oral  Pulse: 89  Respiration: 17  Blood Pressure : (!) 139/102 (Rn Notifed)  Blood Pressure MAP (Calculated): 114 MM HG  BP Location: Left, Upper Arm  Patient BP Position: Sitting     Oxygen  Pulse Oximetry: 95 %  O2 (LPM): 0  O2 Delivery Device: None - Room Air    Bowel and Bladder  Last Bowel Movement  12/20/23  Stool Type  Type 5: Soft blob with clear cut edges (passed easily)  Bowel Device     Continent  Bladder: Continent void   Bowel: Continent movement  Bladder Function  Urine Void (mL):  (large)  Number of Times Voided: 1  Urine Color: Yellow  Genitourinary Assessment   Bladder Assessment (WDL):  WDL Except  Urine Color: Yellow  Time Void: Yes  Bladder Scan: Post Void  $ Bladder Scan Results (mL): 22    Skin  Michael Score   17  Sensory Interventions   Bed Types: Standard/Trauma Mattress  Skin Preventative Measures: Pillows in Use for Support / Positioning  Moisture  Interventions         Pain  Pain Rating Scale  0 - No Pain  Pain Location     Pain Location Orientation     Pain Interventions   Declines    ADLs    Bathing   Shower  Linen Change   Complete  Personal Hygiene  Perineal Care, Moist Lara Wipes  Chlorhexidine Bath      Oral Care  Brushed Teeth  Teeth/Dentures     Shave     Nutrition Percentage Eaten  Dinner, Between 50-75% Consumed  Environmental Precautions     Patient Turns/Positioning  Patient Turns Self from Side to Side  Patient Turns Assistance/Tolerance  Tolerates Well  Bed Positions  Bed Controls On, Bed Locked  Head of Bed Elevated  Self regulated      Psychosocial/Neurologic Assessment  Psychosocial Assessment  Psychosocial (WDL):  WDL Except  Patient Behaviors: Confused  Neurologic Assessment  Neuro (WDL): Exceptions to WDL  Level of Consciousness: Alert  Orientation Level: Disoriented to time, Disoriented to situation  Cognition: Impulsive, Poor judgement, Poor safety awareness  Speech: Clear  Pupil Assesment: Yes  R Pupil Size (mm): 2  R Pupil Shape / Description: Round  R Pupil Reaction: Brisk  L Pupil Size (mm): 2  L Pupil Shape / Description: Round  L Pupil Reaction: Brisk  EENT (WDL):  WDL Except    Cardio/Pulmonary Assessment  Edema      Respiratory Breath Sounds  RUL Breath Sounds: Clear  RML Breath Sounds: Clear  RLL Breath Sounds: Diminished  ADOLFO Breath Sounds: Clear  LLL Breath Sounds: Diminished  Cardiac Assessment   Cardiac (WDL):  WDL Except

## 2023-12-21 NOTE — PROGRESS NOTES
Patient care assumed. Report received from [facility] GIL Hager. Patient is alert and calm, resting in bed. Call light and bedside table within reach. Will continue to monitor.

## 2023-12-21 NOTE — PROGRESS NOTES
Physical Medicine & Rehabilitation Progress Note  _____________________________________  Interdisciplinary Team Conference   Most recent IDT on 12/21/2023    IRandy M.D., was present and led the interdisciplinary team conference on 12/21/2023.  I led the IDT conference and agree with the IDT conference documentation and plan of care as noted below.     Nursing:  Diet Current Diet Order   Procedures    Diet Order Diet: Regular       Eating ADL Independent      % of Last Meal  Oral Nutrition: Dinner, Between 50-75% Consumed   Sleep    Bowel Last BM: 12/20/23   Bladder    Barriers to Discharge Home:weakness      Physical Therapy:  Bed Mobility    Transfers Contact Guard Assist  Verbal cueing, Supervision for safety, Increased time   Mobility Contact Guard Assist   Stairs    Barriers to Discharge Home:weakness      Occupational Therapy:  Grooming Standby Assist   Bathing Minimal Assist   UB Dressing Moderate Assist   LB Dressing Maximal Assist   Toileting Moderate Assist   Shower & Transfer    Barriers to Discharge Home:weakness      Speech-Language Pathology:  Comprehension:  Modified Independent  Comprehension Description:  Glasses  Expression:  Independent  Expression Description:     Social Interaction:  Independent  Social Interaction Description:     Problem Solving:  Moderate Assist  Problem Solving Description:  Verbal cueing, Therapy schedule, Seat belt, Increased time, Bed/chair alarm  Memory:  Moderate Assist  Memory Description:  Verbal cueing, Therapy schedule, Seat belt, Increased time, Bed/chair alarm  Barriers to Discharge Home: cognition    Respiratory Therapy:  O2 (LPM): 0  O2 Delivery Device: None - Room Air    Case Management:  Continues to work on disposition and DME needs.      Discharge Date/Disposition:  12/27/23  _____________________________________   Encounter Date: 12/21/2023    Chief Complaint: Weakness    Interval Events (Subjective):  Seen in posey bed. Did well overnight  with trial. Will dc [place] bed today. No other concerns. Slept well    Objective:  VITAL SIGNS: BP (!) 130/94   Pulse 84   Temp 37.4 °C (99.3 °F) (Oral)   Resp 18   Wt 63.5 kg (140 lb)   SpO2 95%   BMI 24.03 kg/m²   Gen: No acute distress, well developed well nourished adult  HEENT: Normal Cephalic Atraumatic, Normal conjunctiva.   CV: warm extremities, well perfused, no edema  Resp: symmetric chest rise, breathing comfortably on room air  Abd: Soft, Non distended  Extremities: normal bulk, no atrophy  Skin: no visible rashes or lesions.   Neuro: alert, awake  Psych: Mood and affect appropriate and congruent    Laboratory Values:  Recent Results (from the past 72 hour(s))   CBC with Differential    Collection Time: 12/20/23  5:43 AM   Result Value Ref Range    WBC 6.4 4.8 - 10.8 K/uL    RBC 3.89 (L) 4.20 - 5.40 M/uL    Hemoglobin 13.2 12.0 - 16.0 g/dL    Hematocrit 36.3 (L) 37.0 - 47.0 %    MCV 93.3 81.4 - 97.8 fL    MCH 33.9 (H) 27.0 - 33.0 pg    MCHC 36.4 (H) 32.2 - 35.5 g/dL    RDW 40.3 35.9 - 50.0 fL    Platelet Count 365 164 - 446 K/uL    MPV 9.3 9.0 - 12.9 fL    Neutrophils-Polys 64.30 44.00 - 72.00 %    Lymphocytes 20.20 (L) 22.00 - 41.00 %    Monocytes 9.60 0.00 - 13.40 %    Eosinophils 4.50 0.00 - 6.90 %    Basophils 0.90 0.00 - 1.80 %    Immature Granulocytes 0.50 0.00 - 0.90 %    Nucleated RBC 0.00 0.00 - 0.20 /100 WBC    Neutrophils (Absolute) 4.10 1.82 - 7.42 K/uL    Lymphs (Absolute) 1.29 1.00 - 4.80 K/uL    Monos (Absolute) 0.61 0.00 - 0.85 K/uL    Eos (Absolute) 0.29 0.00 - 0.51 K/uL    Baso (Absolute) 0.06 0.00 - 0.12 K/uL    Immature Granulocytes (abs) 0.03 0.00 - 0.11 K/uL    NRBC (Absolute) 0.00 K/uL   Comp Metabolic Panel (CMP)    Collection Time: 12/20/23  5:43 AM   Result Value Ref Range    Sodium 137 135 - 145 mmol/L    Potassium 3.0 (L) 3.6 - 5.5 mmol/L    Chloride 98 96 - 112 mmol/L    Co2 29 20 - 33 mmol/L    Anion Gap 10.0 7.0 - 16.0    Glucose 117 (H) 65 - 99 mg/dL    Bun 14 8 - 22  mg/dL    Creatinine 0.50 0.50 - 1.40 mg/dL    Calcium 8.9 8.5 - 10.5 mg/dL    Correct Calcium 9.5 8.5 - 10.5 mg/dL    AST(SGOT) 12 12 - 45 U/L    ALT(SGPT) 11 2 - 50 U/L    Alkaline Phosphatase 70 30 - 99 U/L    Total Bilirubin 0.7 0.1 - 1.5 mg/dL    Albumin 3.3 3.2 - 4.9 g/dL    Total Protein 5.8 (L) 6.0 - 8.2 g/dL    Globulin 2.5 1.9 - 3.5 g/dL    A-G Ratio 1.3 g/dL   Magnesium    Collection Time: 12/20/23  5:43 AM   Result Value Ref Range    Magnesium 1.9 1.5 - 2.5 mg/dL   ESTIMATED GFR    Collection Time: 12/20/23  5:43 AM   Result Value Ref Range    GFR (CKD-EPI) 98 >60 mL/min/1.73 m 2   Basic Metabolic Panel    Collection Time: 12/21/23  5:45 AM   Result Value Ref Range    Sodium 141 135 - 145 mmol/L    Potassium 3.8 3.6 - 5.5 mmol/L    Chloride 101 96 - 112 mmol/L    Co2 29 20 - 33 mmol/L    Glucose 115 (H) 65 - 99 mg/dL    Bun 13 8 - 22 mg/dL    Creatinine 0.54 0.50 - 1.40 mg/dL    Calcium 8.9 8.5 - 10.5 mg/dL    Anion Gap 11.0 7.0 - 16.0   ESTIMATED GFR    Collection Time: 12/21/23  5:45 AM   Result Value Ref Range    GFR (CKD-EPI) 96 >60 mL/min/1.73 m 2       Medications:  Scheduled Medications   Medication Dose Frequency    melatonin  3 mg AFTER DINNER    Pharmacy Consult Request  1 Each PHARMACY TO DOSE    senna-docusate  2 Tablet BID    omeprazole  20 mg DAILY    amLODIPine  2.5 mg DAILY    calcium carbonate  500 mg BID    lisinopril  10 mg DAILY    metoprolol tartrate  25 mg BID    enoxaparin (LOVENOX) injection  40 mg DAILY AT 1800     PRN medications: Respiratory Therapy Consult, hydrALAZINE, senna-docusate **AND** polyethylene glycol/lytes **AND** magnesium hydroxide **AND** bisacodyl, ondansetron **OR** ondansetron, sodium chloride, methocarbamol, oxyCODONE immediate-release, acetaminophen    Diet:  Current Diet Order   Procedures    Diet Order Diet: Regular       Medical Decision Making and Plan:  Paraplegia s/p L3-4 TLIF ext of fusion   -PT/OT     Acute encephalopathy 2/2 surgery and  medication  - decrease oxycodone and muscle relaxers  -- SLP to evaluate and treat cognitive deficits.   -out of posey bed as if 12/21     HTN  Continue amlodipine and losartan  -may need to increase bp meds, monitor    Neurogenic bladder:  - Timed voids with PVR q4H x3. If PVR > 400mL or if patient is unable to void, straight cath patient.     Neurogenic bowel:  -  Colace, Senna BID on admission  - Goal of 1BM/day.  -   Circadian Rhythm disorder:   -On melatonin prn  -12/20- scheduled after dinner  Recommend lights on during the day/off at night, minimize nighttime interruptions as able.     Mood  - at risk of adjustment disorder, depression, and anxiety due to functional decline     ID:  - at risk for Urinary tract infection     Skin/Wounds:  - Pressure relief q2h while in bed. Close monitoring for signs of breakdown     Pain:  - Neuroceptic - On Tylenol prn, oxycodone 2.5mg PRN, Robaxin 250mg PRN  -No NSAIDS per surgeon     DVT prophylaxis:  continue lovenox     GI prophylaxis:  On Prilosec 20mg daily      -Follow-up Ortho    ____________________________________    Randy Mcclain MD  Physical Medicine & Rehabilitation   Brain Injury Medicine   ____________________________________    Total time:  60 minutes. Time spent included pre-rounding, review of vitals and tests, unit/floor time, face-to-face time with the patient including physical examination, care coordination, counseling of patient and/or family, ordering medications/procedures/tests, discussion with CM, PT, OT, SLP and/or other healthcare providers, and documentation in the electronic medical record. Topics discussed included disposition.

## 2023-12-21 NOTE — CARE PLAN
Problem: Bathing  Goal: STG-Within one week, patient will bathe with SBA.  Outcome: Not Met  Note: New admit     Problem: Dressing  Goal: STG-Within one week, patient will dress UB with Min A.  Outcome: Not Met  Note: New admit  Goal: STG-Within one week, patient will dress LB with Min A.  Outcome: Not Met  Note: New admit     Problem: Functional Transfers  Goal: STG-Within one week, patient will transfer to toilet with SBA.  Outcome: Not Met  Note: New admit  Goal: STG-Within one week, patient will transfer to step in shower with SBA.  Outcome: Not Met  Note: New admit     Problem: IADL's  Goal: STG-Within one week, patient will prepare a meal with CGA.  Outcome: Not Met  Note: New admit

## 2023-12-21 NOTE — THERAPY
Occupational Therapy  Daily Treatment     Patient Name: Rin Yung  Age:  73 y.o., Sex:  female  Medical Record #: [id number]  Today's Date: 12/21/2023     Precautions  Precautions: Fall Risk, Lumbosacral Corset, Spinal / Back Precautions   Comments: LSO OOB, baseline cognitive deficits         Subjective    Pt seated in w/c upon arrival, pleasant and cooperative, agreeable to therapy.       Objective       12/21/23 1331   OT Charge Group   OT Self Care / ADL (Units) 2   OT Therapy Activity (Units) 2   OT Total Time Spent   OT Individual Total Time Spent (Mins) 60   Precautions   Precautions Fall Risk;Lumbosacral Corset;Spinal / Back Precautions    Comments LSO OOB, baseline cognitive deficits   Functional Level of Assist   Grooming Supervision;Standing   Upper Body Dressing Stand by Assist  (doff LSO with Max cues)   Lower Body Dressing Standby Assist  (don pants)   Toileting Minimal Assist  (with cues for sequencing, drawstring mgmt)   Toilet Transfers Contact Guard Assist  (with no AD)   Tub / Shower Transfers Standby Assist  (dry tub transfer using tub transfer bench, cues for sequencing)   Bed Mobility    Sit to Supine Standby Assist   Interdisciplinary Plan of Care Collaboration   Patient Position at End of Therapy Call Light within Reach;Tray Table within Reach;Bed Alarm On;In Bed     Pt reports that she completes all IADL's on her own including driving. But she will not  drive at d/c because the surgeon said she cannot drive for 3 weeks following surgery     Functional mobility throughout indoors room<>cafeteria without seated RB, CGA with no AD    Initiated edu re: doff LSO    Assessment    Pt motivated to do as much walking as she can to improve strength. Pt requires CGA when not using an AD d/t shuffling gait and impaired balance. Pt required Min-Max cues to successfully complete tasks and ensure safety d/t confusion, especially with sequencing and memory. Examples requiring cues included: pt lifting  toilet seat up prior to sitting, taking pants off completely while seated on toilet, assist for clothing orientation when donning pants, assist to problem solve getting water from dispenser in cafeteria, pt attempting to slide across tub transfer bench on wrong side (back rest side).    Strengths: Able to follow instructions, Independent prior level of function, Motivated for self care and independence, Pleasant and cooperative, Supportive family  Barriers: Decreased endurance, Fatigue, Generalized weakness, Impaired activity tolerance, Limited mobility    Plan    Cont overall strength/endurance and standing balance to improve ADL's and functional mobility     FT re: bathroom DME needs - tub transfer bench, hand-held shower head for tub shower, need for 24 hr supervision    DME  OT DME Recommendations  Bathroom Equipment: tub transfer bench, hand-held shower head     Pt lives in [place] alone, plan for sister to stay with pt at d/c, 1 story, tub shower, grab bars in shower    Occupational Therapy Goals (Active)       Problem: Bathing       Dates: Start:  12/20/23         Goal: STG-Within one week, patient will bathe with SBA.       Dates: Start:  12/20/23    Expected End:  01/12/24         Goal Note filed on 12/21/23 0813 by Carri Marroquin MS,OTR/L       New admit                 Problem: Dressing       Dates: Start:  12/20/23         Goal: STG-Within one week, patient will dress UB with Min A.       Dates: Start:  12/20/23    Expected End:  01/12/24         Goal Note filed on 12/21/23 0813 by Carri Marroquin MS,OTR/L       New admit              Goal: STG-Within one week, patient will dress LB with Min A.       Dates: Start:  12/20/23    Expected End:  01/12/24         Goal Note filed on 12/21/23 0813 by Carri Marroquin MS,OTR/L       New admit                 Problem: Functional Transfers       Dates: Start:  12/20/23         Goal: STG-Within one week, patient will transfer to toilet with SBA.        Dates: Start:  12/20/23    Expected End:  01/12/24         Goal Note filed on 12/21/23 0813 by Carri Marroquin MS,OTR/L       New admit              Goal: STG-Within one week, patient will transfer to step in shower with SBA.       Dates: Start:  12/20/23    Expected End:  01/12/24         Goal Note filed on 12/21/23 0813 by Carri Marroquin MS,OTR/L       New admit                 Problem: IADL's       Dates: Start:  12/20/23         Goal: STG-Within one week, patient will prepare a meal with CGA.       Dates: Start:  12/20/23    Expected End:  01/12/24         Goal Note filed on 12/21/23 0813 by Carri Marroquin MS,OTR/L       New admit                 Problem: OT Long Term Goals       Dates: Start:  12/20/23         Goal: LTG-By discharge, patient will complete basic self care tasks with Mod Independent level.       Dates: Start:  12/20/23    Expected End:  01/12/24            Goal: LTG-By discharge, patient will perform bathroom transfers with Mod Independent level.       Dates: Start:  12/20/23    Expected End:  01/12/24

## 2023-12-21 NOTE — DISCHARGE PLANNING
CASE MANAGEMENT INITIAL ASSESSMENT    Admit Date:  12/19/2023     CM to speak to family to discuss role of case management / discharge planning / team conference.   Patient is a  73 y.o. female transferred from [facility].    Diagnosis: Lumbar stenosis without neurogenic claudication [M48.061]  Acute metabolic encephalopathy [G93.41]    Co-morbidities:   Patient Active Problem List    Diagnosis Date Noted    Acute metabolic encephalopathy 12/19/2023    PONV (postoperative nausea and vomiting) 12/14/2023    HTN (hypertension) 12/14/2023    Lumbar stenosis without neurogenic claudication 12/14/2023     Prior Living Situation:  Housing / Facility: [address] [facility]  Lives with - Patient's Self Care Capacity: Alone and Able to Care For Self    Prior Level of Function:  Medication Management: Independent  Finances: Independent  Home Management: Independent  Shopping: Independent  Prior Level Of Mobility: Independent Without Device in Community  Driving / Transportation: Driving Independent    Support Systems:  Son       Previous Services Utilized:   Equipment Owned: Front-Wheel Walker  Prior Services: Home-Independent    Other Information:  Occupation (Pre-Hospital Vocational): Retired Due To Age     Primary Payor Source: Medicare A, Medicare B  Secondary Payor Source:  (AARP)    Patient / Family Goal:  Patient / Family Goal: Return Home    Plan:  1. Continue to follow patient through hospitalization and provide discharge planning in collaboration with patient, family, physicians and ancillary services.     2. Utilize community resources to ensure a safe discharge.

## 2023-12-22 ENCOUNTER — APPOINTMENT (OUTPATIENT)
Dept: SPEECH THERAPY | Facility: REHABILITATION | Age: 73
DRG: 052 | End: 2023-12-22
Attending: PHYSICAL MEDICINE & REHABILITATION
Payer: MEDICARE

## 2023-12-22 ENCOUNTER — APPOINTMENT (OUTPATIENT)
Dept: PHYSICAL THERAPY | Facility: REHABILITATION | Age: 73
DRG: 052 | End: 2023-12-22
Attending: PHYSICAL MEDICINE & REHABILITATION
Payer: MEDICARE

## 2023-12-22 ENCOUNTER — APPOINTMENT (OUTPATIENT)
Dept: OCCUPATIONAL THERAPY | Facility: REHABILITATION | Age: 73
DRG: 052 | End: 2023-12-22
Attending: PHYSICAL MEDICINE & REHABILITATION
Payer: MEDICARE

## 2023-12-22 PROCEDURE — 97530 THERAPEUTIC ACTIVITIES: CPT

## 2023-12-22 PROCEDURE — 700111 HCHG RX REV CODE 636 W/ 250 OVERRIDE (IP): Mod: JZ | Performed by: PHYSICAL MEDICINE & REHABILITATION

## 2023-12-22 PROCEDURE — 97112 NEUROMUSCULAR REEDUCATION: CPT

## 2023-12-22 PROCEDURE — A9270 NON-COVERED ITEM OR SERVICE: HCPCS | Performed by: PHYSICAL MEDICINE & REHABILITATION

## 2023-12-22 PROCEDURE — 97535 SELF CARE MNGMENT TRAINING: CPT

## 2023-12-22 PROCEDURE — 97130 THER IVNTJ EA ADDL 15 MIN: CPT

## 2023-12-22 PROCEDURE — 97116 GAIT TRAINING THERAPY: CPT

## 2023-12-22 PROCEDURE — 700102 HCHG RX REV CODE 250 W/ 637 OVERRIDE(OP): Performed by: PHYSICAL MEDICINE & REHABILITATION

## 2023-12-22 PROCEDURE — 97129 THER IVNTJ 1ST 15 MIN: CPT

## 2023-12-22 PROCEDURE — 770010 HCHG ROOM/CARE - REHAB SEMI PRIVAT*

## 2023-12-22 PROCEDURE — 99231 SBSQ HOSP IP/OBS SF/LOW 25: CPT | Performed by: PHYSICAL MEDICINE & REHABILITATION

## 2023-12-22 RX ADMIN — OMEPRAZOLE 20 MG: 20 CAPSULE, DELAYED RELEASE ORAL at 08:27

## 2023-12-22 RX ADMIN — METOPROLOL TARTRATE 25 MG: 25 TABLET, FILM COATED ORAL at 17:45

## 2023-12-22 RX ADMIN — METOPROLOL TARTRATE 25 MG: 25 TABLET, FILM COATED ORAL at 05:27

## 2023-12-22 RX ADMIN — LISINOPRIL 10 MG: 5 TABLET ORAL at 05:27

## 2023-12-22 RX ADMIN — ENOXAPARIN SODIUM 40 MG: 100 INJECTION SUBCUTANEOUS at 17:45

## 2023-12-22 RX ADMIN — CALCIUM CARBONATE (ANTACID) CHEW TAB 500 MG 500 MG: 500 CHEW TAB at 08:26

## 2023-12-22 RX ADMIN — AMLODIPINE BESYLATE 2.5 MG: 5 TABLET ORAL at 05:27

## 2023-12-22 RX ADMIN — CALCIUM CARBONATE (ANTACID) CHEW TAB 500 MG 500 MG: 500 CHEW TAB at 20:30

## 2023-12-22 RX ADMIN — Medication 3 MG: at 17:45

## 2023-12-22 ASSESSMENT — ACTIVITIES OF DAILY LIVING (ADL)
TOILET_TRANSFER_DESCRIPTION: GRAB BAR;INCREASED TIME;SET-UP OF EQUIPMENT;SUPERVISION FOR SAFETY;VERBAL CUEING
BED_CHAIR_WHEELCHAIR_TRANSFER_DESCRIPTION: ADAPTIVE EQUIPMENT;SET-UP OF EQUIPMENT;SUPERVISION FOR SAFETY;VERBAL CUEING

## 2023-12-22 ASSESSMENT — GAIT ASSESSMENTS
DEVIATION: BRADYKINETIC;DECREASED HEEL STRIKE
ASSISTIVE DEVICE: FRONT WHEEL WALKER
DISTANCE (FEET): 750
GAIT LEVEL OF ASSIST: STANDBY ASSIST

## 2023-12-22 ASSESSMENT — PAIN DESCRIPTION - PAIN TYPE
TYPE: ACUTE PAIN
TYPE: ACUTE PAIN

## 2023-12-22 NOTE — PROGRESS NOTES
Physical Medicine & Rehabilitation Progress Note    Encounter Date: 12/22/2023    Chief Complaint: Low back pain    Interval Events (Subjective):    Patient was evaluated in her room working with occupational therapy, educated on TLSO.   She denies any increase in low back pain.  She denies any changes in lower extremity strength  She is concerned about discharge date    Last BM: 12/22/23      ROS:  Gen: No fatigue, confusion, significant weight loss  Eyes: no blurry vision, double vision or pain in eyes  ENT: no changes in hearing, runny nose, nose bleeds, sinus pain  CV: No CP, palpitations  Lungs: no shortness of breath, changes in secretions, changes in cough, pain with coughing  Abd: No abd pain, nausea, vomiting, pain with eating  : no blood in urine, pain during storage phase, bladder spasms, suprapubic pain  Ext: No swelling in legs, asymmetric atrophy  Neuro: no changes in strength or sensation  Skin: no new ulcers/skin breakdown appreciated by patient  Mood: No changes in mood today, increase in depression or anxiety  Heme: no bruising, or bleeding    Objective:  Vitals: BP (!) 132/94   Pulse 80   Temp 36.8 °C (98.3 °F) (Oral)   Resp 16   Wt 63.5 kg (140 lb)   SpO2 96%   Gen: NAD, Body mass index is 24.03 kg/m².  HEENT:  NC/AT, PERRLA, moist mucous membranes  Cardio: RRR, no mumurs  Pulm: CTAB, with normal respiratory effort  Abd: Soft NTND, active bowel sounds,   Ext: No peripheral edema. No calf tenderness. No clubbing/cyanosis. +dorsal pedalis pulses bilaterally.  Incision is clean dry and tacked no increased warmth    Laboratory Values:  Recent Results (from the past 72 hour(s))   CBC with Differential    Collection Time: 12/20/23  5:43 AM   Result Value Ref Range    WBC 6.4 4.8 - 10.8 K/uL    RBC 3.89 (L) 4.20 - 5.40 M/uL    Hemoglobin 13.2 12.0 - 16.0 g/dL    Hematocrit 36.3 (L) 37.0 - 47.0 %    MCV 93.3 81.4 - 97.8 fL    MCH 33.9 (H) 27.0 - 33.0 pg    MCHC 36.4 (H) 32.2 - 35.5 g/dL    RDW  40.3 35.9 - 50.0 fL    Platelet Count 365 164 - 446 K/uL    MPV 9.3 9.0 - 12.9 fL    Neutrophils-Polys 64.30 44.00 - 72.00 %    Lymphocytes 20.20 (L) 22.00 - 41.00 %    Monocytes 9.60 0.00 - 13.40 %    Eosinophils 4.50 0.00 - 6.90 %    Basophils 0.90 0.00 - 1.80 %    Immature Granulocytes 0.50 0.00 - 0.90 %    Nucleated RBC 0.00 0.00 - 0.20 /100 WBC    Neutrophils (Absolute) 4.10 1.82 - 7.42 K/uL    Lymphs (Absolute) 1.29 1.00 - 4.80 K/uL    Monos (Absolute) 0.61 0.00 - 0.85 K/uL    Eos (Absolute) 0.29 0.00 - 0.51 K/uL    Baso (Absolute) 0.06 0.00 - 0.12 K/uL    Immature Granulocytes (abs) 0.03 0.00 - 0.11 K/uL    NRBC (Absolute) 0.00 K/uL   Comp Metabolic Panel (CMP)    Collection Time: 12/20/23  5:43 AM   Result Value Ref Range    Sodium 137 135 - 145 mmol/L    Potassium 3.0 (L) 3.6 - 5.5 mmol/L    Chloride 98 96 - 112 mmol/L    Co2 29 20 - 33 mmol/L    Anion Gap 10.0 7.0 - 16.0    Glucose 117 (H) 65 - 99 mg/dL    Bun 14 8 - 22 mg/dL    Creatinine 0.50 0.50 - 1.40 mg/dL    Calcium 8.9 8.5 - 10.5 mg/dL    Correct Calcium 9.5 8.5 - 10.5 mg/dL    AST(SGOT) 12 12 - 45 U/L    ALT(SGPT) 11 2 - 50 U/L    Alkaline Phosphatase 70 30 - 99 U/L    Total Bilirubin 0.7 0.1 - 1.5 mg/dL    Albumin 3.3 3.2 - 4.9 g/dL    Total Protein 5.8 (L) 6.0 - 8.2 g/dL    Globulin 2.5 1.9 - 3.5 g/dL    A-G Ratio 1.3 g/dL   Magnesium    Collection Time: 12/20/23  5:43 AM   Result Value Ref Range    Magnesium 1.9 1.5 - 2.5 mg/dL   ESTIMATED GFR    Collection Time: 12/20/23  5:43 AM   Result Value Ref Range    GFR (CKD-EPI) 98 >60 mL/min/1.73 m 2   Basic Metabolic Panel    Collection Time: 12/21/23  5:45 AM   Result Value Ref Range    Sodium 141 135 - 145 mmol/L    Potassium 3.8 3.6 - 5.5 mmol/L    Chloride 101 96 - 112 mmol/L    Co2 29 20 - 33 mmol/L    Glucose 115 (H) 65 - 99 mg/dL    Bun 13 8 - 22 mg/dL    Creatinine 0.54 0.50 - 1.40 mg/dL    Calcium 8.9 8.5 - 10.5 mg/dL    Anion Gap 11.0 7.0 - 16.0   ESTIMATED GFR    Collection Time: 12/21/23   5:45 AM   Result Value Ref Range    GFR (CKD-EPI) 96 >60 mL/min/1.73 m 2       Medications:  Scheduled Medications   Medication Dose Frequency    melatonin  3 mg AFTER DINNER    Pharmacy Consult Request  1 Each PHARMACY TO DOSE    senna-docusate  2 Tablet BID    omeprazole  20 mg DAILY    amLODIPine  2.5 mg DAILY    calcium carbonate  500 mg BID    lisinopril  10 mg DAILY    metoprolol tartrate  25 mg BID    enoxaparin (LOVENOX) injection  40 mg DAILY AT 1800     PRN medications: Respiratory Therapy Consult, hydrALAZINE, senna-docusate **AND** polyethylene glycol/lytes **AND** magnesium hydroxide **AND** bisacodyl, ondansetron **OR** ondansetron, sodium chloride, methocarbamol, oxyCODONE immediate-release, acetaminophen    Diet:  Current Diet Order   Procedures    Diet Order Diet: Regular       Assessment:  Active Hospital Problems    Diagnosis     *Acute metabolic encephalopathy        Medical Decision Making and Plan:    Modified from Dr. Mcclain's to progress on 12/21/2023  Paraplegia s/p L3-4 TLIF ext of fusion   -Continue comprehensive acute inpatient rehabilitation     Acute encephalopathy 2/2 surgery and medication  - decrease oxycodone and muscle relaxers  -- SLP to evaluate and treat cognitive deficits.   -out of posey bed as if 12/21     HTN  Continue amlodipine and losartan  -may need to increase bp meds, monitor     Neurogenic bladder:  - Timed voids with PVR q4H x3. If PVR > 400mL or if patient is unable to void, straight cath patient.     Neurogenic bowel:  -  Colace, Senna BID on admission  - Goal of 1BM/day.  -   Circadian Rhythm disorder:   -On melatonin prn  -12/20- scheduled after dinner  Recommend lights on during the day/off at night, minimize nighttime interruptions as able.     Mood  - at risk of adjustment disorder, depression, and anxiety due to functional decline     ID:  - at risk for Urinary tract infection     Skin/Wounds:  - Pressure relief q2h while in bed. Close monitoring for signs of  breakdown     Pain:  - Neuroceptic - On Tylenol prn, oxycodone 2.5mg PRN, Robaxin 250mg PRN  -No NSAIDS per surgeon     DVT prophylaxis:  continue lovenox     GI prophylaxis:  On Prilosec 20mg daily      -Follow-up Ortho  ____________________________________    Jani Azar DO  [facility] - Physical Medicine & Rehabilitation   [facility] - Spinal Cord Injury Medicine  ____________________________________

## 2023-12-22 NOTE — THERAPY
Physical Therapy   Daily Treatment     Patient Name: Rin Yung  Age:  73 y.o., Sex:  female  Medical Record #: [id number]  Today's Date: 12/22/2023     Precautions  Precautions: Fall Risk, Lumbosacral Corset, Spinal / Back Precautions   Comments: LSO OOB, baseline cognitive deficits    Subjective    Pt received resting in bed, agreeable to participate. Requested assistance with navigating her personal phone.     Objective       12/22/23 0701   PT Charge Group   PT Gait Training (Units) 2   PT Neuromuscular Re-Education / Balance (Units) 1   PT Therapeutic Activities (Units) 1   PT Total Time Spent   PT Individual Total Time Spent (Mins) 60   Vitals   O2 Delivery Device None - Room Air   Pain 0 - 10 Group   Therapist Pain Assessment Post Activity Pain Same as Prior to Activity;Nurse Notified  (no c/o pain during session)   Gait Functional Level of Assist    Gait Level Of Assist Standby Assist   Assistive Device Front Wheel Walker   Distance (Feet) 750   # of Times Distance was Traveled 1   Deviation Bradykinetic;Decreased Heel Strike  (no signs of fatigue)   Transfer Functional Level of Assist   Bed, Chair, Wheelchair Transfer Standby Assist   Bed Chair Wheelchair Transfer Description Adaptive equipment;Set-up of equipment;Supervision for safety;Verbal cueing  (stand step FWW)   Toilet Transfers Standby Assist   Toilet Transfer Description Grab bar;Increased time;Set-up of equipment;Supervision for safety;Verbal cueing  (wc<>toilet)   Bed Mobility    Supine to Sit Supervised   Sit to Stand Standby Assist   Scooting Supervised   Neuro-Muscular Treatments   Neuro-Muscular Treatments Anterior weight shift;Compensatory Strategies;Postural Facilitation;Sensory Stimuli;Sequencing;Tactile Cuing;Verbal Cuing   Comments See PT note for obstacle course details. Also performed standing dyanmic balance training on airex pad holding large unweighted ball, forw chest press and OH press 2x10 ea. Required up to CGA-min A to  "correct 2-3 minor posterior LOB   Interdisciplinary Plan of Care Collaboration   Patient Position at End of Therapy Seated;Chair Alarm On;Self Releasing Lap Belt Applied;Call Light within Reach;Tray Table within Reach;Phone within Reach     Pt attempted to don LSO upside down while seated EOB, required assist to correct and don.    Stood for approx 6 continuous min in bathroom to change gown, wash hands, and brush hair with SPV.    Performed obstacle course 50 ft x4 with FWW and SBA unless otherwise noted including:  Overlapping mats  Step over half bolster   Step up and over 4\" step  Standing marches x10 on airex pad with BUE support on FWW - required up to CGA-min A to correct posterior LOB  Weaving bw cones  Added cognitive load for last 2 bouts (reciting months of the year fwd/bwd, counting to 20 forw/bwd).    Assessment    Pt tolerated session well focused on gait endurance and dynamic standing balance. With significant improvement in gait distance up to 750 ft with FWW and SBA, no rest breaks. However pt with occasional LOB posteriorly when standing on airex pad requiring CGA-min A to prevent falling. Demo'ed slowed time through the obstacle course with increased cueing to perform each activity properly when cognitive load was added.    Strengths: Motivated for self care and independence, Willingly participates in therapeutic activities, Adequate strength  Barriers: Impaired balance, Impaired activity tolerance, Decreased endurance, Generalized weakness, Fatigue, Limited mobility    Plan    Balance, gait, safety awareness, cardiovascular endurance     DME  PT DME Recommendations  Wheelchair:  (no AD anticipated)    Passport items to be completed:  Get in/out of bed safely, in/out of a vehicle, safely use mobility device, walk or wheel around home/community, navigate up and down stairs, show how to get up/down from the ground, ensure home is accessible, demonstrate HEP, complete caregiver training    Physical " Therapy Problems (Active)       Problem: Balance       Dates: Start:  12/20/23         Goal: STG-Within one week, patient will maintain dynamic standing       Dates: Start:  12/20/23    Expected End:  01/12/24       Description: In parallel bars on varying surfaces 1min x 4 with SPV      Goal Note filed on 12/20/23 1444 by Angelica Cardoso, MSPT        parallel bars on varying surfaces to challenge balance 1min x 4 with SPV                 Problem: Mobility       Dates: Start:  12/20/23         Goal: STG-Within one week, patient will ambulate household distance no AD SPV        Dates: Start:  12/20/23    Expected End:  01/12/24            Goal: STG-Within one week, patient will ambulate up/down a curb with no AD CGA       Dates: Start:  12/20/23    Expected End:  01/12/24               Problem: PT-Long Term Goals       Dates: Start:  12/20/23         Goal: LTG-By discharge, patient will ambulate 50ft x 4 with no AD mod I       Dates: Start:  12/20/23    Expected End:  01/12/24            Goal: LTG-By discharge, patient will transfer one surface to another mod I SPT safely and consistently       Dates: Start:  12/20/23    Expected End:  01/12/24            Goal: LTG-By discharge, patient will perform home exercise program seated/standing there-ex for LE strengthening to be done 3x/day in safe, independent home environment       Dates: Start:  12/20/23    Expected End:  01/12/24            Goal: LTG-By discharge, patient will up/down threshold step for home entrance with no AD SPV       Dates: Start:  12/20/23    Expected End:  01/12/24

## 2023-12-22 NOTE — THERAPY
Occupational Therapy  Daily Treatment     Patient Name: Rin Yung  Age:  73 y.o., Sex:  female  Medical Record #: [id number]  Today's Date: 12/22/2023     Precautions  Precautions: Fall Risk, Lumbosacral Corset, Spinal / Back Precautions   Comments: LSO OOB, baseline cognitive deficits         Subjective    Pt toileting with CNA assist upon arrival, pleasant and cooperative, agreeable to therapy       Objective       12/22/23 1231   OT Charge Group   OT Self Care / ADL (Units) 2   OT Therapy Activity (Units) 2   OT Total Time Spent   OT Individual Total Time Spent (Mins) 60   Functional Level of Assist   Grooming Supervision;Standing   Upper Body Dressing Minimal Assist   Lower Body Dressing Minimal Assist   Lower Body Dressing Description Assist with threading into pant leg   Toileting Moderate Assist  (assist to tie drawstring)   Toilet Transfers Standby Assist  (with FWW)   Bed Mobility    Sit to Supine Supervised   Interdisciplinary Plan of Care Collaboration   Patient Position at End of Therapy Seated;Call Light within Reach;Tray Table within Reach     Functional mobility at FWW: SBA throughout indoors    Blocked practice don/doff  LSO:  - used mirror and demo back   - applied orange tap to straps for improve contrast  - hands-on practice don/doff x 8    Assessment    Session focused on repetitive don/doff LSO mgmt - pt cont to require Min cues for sequencing by end of session. Pt would continue to benefit from ongoing practice to improve carry-over of learning    Strengths: Able to follow instructions, Independent prior level of function, Motivated for self care and independence, Pleasant and cooperative, Supportive family  Barriers: Decreased endurance, Fatigue, Generalized weakness, Impaired activity tolerance, Limited mobility    Plan     Cont overall strength/endurance and standing balance to improve ADL's and functional mobility      FT re: bathroom DME needs - tub transfer bench, hand-held shower  head for tub shower, need for 24 hr supervision     DME  OT DME Recommendations  Bathroom Equipment: tub transfer bench, hand-held shower head      Pt lives in [place] alone, plan for sister to stay with pt at d/c, 1 story, tub shower, grab bars in shower    Occupational Therapy Goals (Active)       Problem: Bathing       Dates: Start:  12/20/23         Goal: STG-Within one week, patient will bathe with SBA.       Dates: Start:  12/20/23    Expected End:  01/12/24         Goal Note filed on 12/21/23 0813 by Carri Marroquin MS,OTR/L       New admit                 Problem: Dressing       Dates: Start:  12/20/23         Goal: STG-Within one week, patient will dress UB with Min A.       Dates: Start:  12/20/23    Expected End:  01/12/24         Goal Note filed on 12/21/23 0813 by Carri Marroquin MS,OTR/L       New admit              Goal: STG-Within one week, patient will dress LB with Min A.       Dates: Start:  12/20/23    Expected End:  01/12/24         Goal Note filed on 12/21/23 0813 by Carri Marroquin MS,OTR/L       New admit                 Problem: Functional Transfers       Dates: Start:  12/20/23         Goal: STG-Within one week, patient will transfer to toilet with SBA.       Dates: Start:  12/20/23    Expected End:  01/12/24         Goal Note filed on 12/21/23 0813 by Carri Marroquin MS,OTR/L       New admit              Goal: STG-Within one week, patient will transfer to step in shower with SBA.       Dates: Start:  12/20/23    Expected End:  01/12/24         Goal Note filed on 12/21/23 0813 by Carri Marroquin MS,OTR/L       New admit                 Problem: IADL's       Dates: Start:  12/20/23         Goal: STG-Within one week, patient will prepare a meal with CGA.       Dates: Start:  12/20/23    Expected End:  01/12/24         Goal Note filed on 12/21/23 0813 by Carri Marroquin MS,OTR/L       New admit                 Problem: OT Long Term Goals       Dates: Start:   12/20/23         Goal: LTG-By discharge, patient will complete basic self care tasks with Mod Independent level.       Dates: Start:  12/20/23    Expected End:  01/12/24            Goal: LTG-By discharge, patient will perform bathroom transfers with Mod Independent level.       Dates: Start:  12/20/23    Expected End:  01/12/24

## 2023-12-22 NOTE — THERAPY
"Speech Language Pathology  Daily Treatment     Patient Name: Rin Yung  Age:  73 y.o., Sex:  female  Medical Record #: [id number]  Today's Date: 12/22/2023     Precautions  Precautions: Fall Risk, Lumbosacral Corset, Spinal / Back Precautions   Comments: LSO OOB, baseline cognitive deficits    Subjective    Pt pleasant and cooperative, pt well aware of difficulty with cognition at this time and expressed frustration and disappointment re: how long task was taking to complete.      Objective       12/22/23 0903   Treatment Charges   SLP Cognitive Skill Development First 15 Minutes 1   SLP Cognitive Skill Development Additional 15 Minutes 1   SLP Total Time Spent   SLP Individual Total Time Spent (Mins) 30         Assessment    Pt expressed importance of indep with finances. Pt presented with attention task with financial management element - who has more coins, pt required additional time for processing and completion. For what pt did complete she was 100% accurate however only completed 9 calculations in 20 min time period. Pt indep expressing \"this shouldn't be this hard\" and \"this shouldn't be taking me this long.\"    Strengths: Alert and oriented, Motivated for self care and independence, Pleasant and cooperative, Supportive family, Willingly participates in therapeutic activities  Barriers: Impaired carryover of learning, Impaired functional cognition    Plan    Cont who has more task     Speech Therapy Problems (Active)       Problem: Memory STGs       Dates: Start:  12/20/23         Goal: STG-Within one week, patient will recall new information related to her current hospitalization with hamzah [facility]         Dates: Start:  12/20/23    Expected End:  01/12/24         Goal Note filed on 12/21/23 0805 by Pilo Dominguez MS,CCC-SLP       Pt evaluated yesterday, continue to target                  Problem: Problem Solving STGs       Dates: Start:  12/20/23         Goal: STG-Within one week, patient will complete " medication management tasks given min A to achieve 80% accuracy.        Dates: Start:  12/20/23    Expected End:  01/12/24         Goal Note filed on 12/21/23 0805 by Pilo Dominguez MS,CCC-SLP       Pt evaluated yesterday, continue to target               Goal: STG-Within one week, patient will complete administration of the SCCAN.         Dates: Start:  12/20/23    Expected End:  01/12/24         Goal Note filed on 12/21/23 0805 by Pilo Dominguez MS,CCC-SLP       Pt evaluated yesterday, continue to target                  Problem: Speech/Swallowing LTGs       Dates: Start:  12/20/23         Goal: LTG-By discharge, patient will solve complex problems with spv.        Dates: Start:  12/20/23    Expected End:  01/12/24

## 2023-12-22 NOTE — CARE PLAN
"The patient is Stable - Low risk of patient condition declining or worsening    Shift Goals  Clinical Goals: safety  Patient Goals: safety    Problem: Skin Integrity  Goal: Skin integrity is maintained or improved  Outcome: Progressing  Note:   Michael Score: 16    Patient's skin remains intact and free from new or accidental injury this shift; no s/s of infection. RN wound protocol checked. Encouraged hydration and educated about the importance of nutrition to keep skin integrity. Will continue to monitor.      Problem: Fall Risk - Rehab  Goal: Patient will remain free from falls  Outcome: Progressing  Note: Marlene Stratton Fall risk Assessment Score: 22    High fall risk Interventions   - Bed and strip alarm   - Yellow sign by the door   - Yellow wrist band \"Fall risk\"  - Room near to the nurse station  - Do not leave patient unattended in the bathroom  - Fall risk education provided     "

## 2023-12-22 NOTE — THERAPY
"Speech Language Pathology  Daily Treatment     Patient Name: Rin Yung  Age:  73 y.o., Sex:  female  Medical Record #: [id number]  Today's Date: 12/22/2023     Precautions  Precautions: Fall Risk, Lumbosacral Corset, Spinal / Back Precautions   Comments: LSO OOB, baseline cognitive deficits    Subjective    Patient pleasant and agreeable to SLP services this session.     Objective       12/22/23 1001   Treatment Charges   SLP Cognitive Skill Development First 15 Minutes 1   SLP Cognitive Skill Development Additional 15 Minutes 1   SLP Total Time Spent   SLP Individual Total Time Spent (Mins) 30   Cognition   Functional Math / Financial Management Moderate (3)   Interdisciplinary Plan of Care Collaboration   Patient Position at End of Therapy Call Light within Reach;Tray Table within Reach;Phone within Reach;In Bed;Bed Alarm On         Assessment    Patient completed remaining \"who has more money\" task started last ST session; patient required min-mod assistance to solve fx financial math tasks with 100% accuracy. Patient continued to report that she feels this task should be easy for her, and that she is surprised with how challenging it feels to her.    Strengths: Alert and oriented, Motivated for self care and independence, Pleasant and cooperative, Supportive family, Willingly participates in therapeutic activities  Barriers: Impaired carryover of learning, Impaired functional cognition    Plan    Continue to address financial management, medication management, problem solving    Passport items to be completed:  Express basic needs, understand food/liquid recommendations, consistently follow swallow precautions, manage finances, manage medications, arrive to therapy appointments on time, complete daily memory log entries, solve problems related to safety situations, review education related to hospitalization, complete caregiver training     Speech Therapy Problems (Active)       Problem: Memory STGs       " Dates: Start:  12/20/23         Goal: STG-Within one week, patient will recall new information related to her current hospitalization with [facility].         Dates: Start:  12/20/23    Expected End:  01/12/24         Goal Note filed on 12/21/23 0805 by Pilo Dominguez MS,CCC-SLP       Pt evaluated yesterday, continue to target                  Problem: Problem Solving STGs       Dates: Start:  12/20/23         Goal: STG-Within one week, patient will complete medication management tasks given min A to achieve 80% accuracy.        Dates: Start:  12/20/23    Expected End:  01/12/24         Goal Note filed on 12/21/23 0805 by Pilo Dominguez MS,CCC-SLP       Pt evaluated yesterday, continue to target               Goal: STG-Within one week, patient will complete administration of the SCCAN.         Dates: Start:  12/20/23    Expected End:  01/12/24         Goal Note filed on 12/21/23 0805 by Pilo Dominguez MS,CCC-SLP       Pt evaluated yesterday, continue to target                  Problem: Speech/Swallowing LTGs       Dates: Start:  12/20/23         Goal: LTG-By discharge, patient will solve complex problems with spv.        Dates: Start:  12/20/23    Expected End:  01/12/24

## 2023-12-22 NOTE — PROGRESS NOTES
NURSING DAILY NOTE    Name: Rin Yung   Date of Admission: 12/19/2023   Admitting Diagnosis: Acute metabolic encephalopathy  Attending Physician: Randy Mcclain M.d.  Allergies: Patient has no allergy information on record.    Safety  Patient Assist  CGA  Patient Precautions  Fall Risk, Lumbosacral Corset, Spinal / Back Precautions   Precaution Comments  LSO OOB, baseline cognitive deficits  Bed Transfer Status  Contact Guard Assist  Toilet Transfer Status   Contact Guard Assist (with no AD)  Assistive Devices  Rails, Wheelchair  Oxygen  None - Room Air  Diet/Therapeutic Dining  Current Diet Order   Procedures    Diet Order Diet: Regular     Pill Administration  whole  Agitated Behavioral Scale     ABS Level of Severity       Fall Risk  Has the patient had a fall this admission?   No  Marlene Stratton Fall Risk Scoring  22, HIGH RISK  Fall Risk Safety Measures  bed alarm and chair alarm    Vitals  Temperature: 36.9 °C (98.5 °F)  Temp src: Temporal  Pulse: 85  Respiration: 18  Blood Pressure : (!) 142/91  Blood Pressure MAP (Calculated): 108 MM HG  BP Location: Right, Upper Arm  Patient BP Position: Sitting     Oxygen  Pulse Oximetry: 93 %  O2 (LPM): 0  O2 Delivery Device: None - Room Air    Bowel and Bladder  Last Bowel Movement  12/22/23  Stool Type  Type 4: Like a sausage or snake, smooth and soft  Bowel Device     Continent  Bladder: Continent void   Bowel: Continent movement  Bladder Function  Urine Void (mL):  (large)  Number of Times Voided: 1  Urine Color: Yellow  Genitourinary Assessment   Bladder Assessment (WDL):  WDL Except  Urine Color: Yellow  Time Void: Yes  Bladder Scan: Post Void  $ Bladder Scan Results (mL): 22    Skin  Michael Score   16  Sensory Interventions   Bed Types: Other (Comments) ([facility] bed)  Skin Preventative Measures: Pillows in Use for Support / Positioning  Moisture Interventions  Moisturizers/Barriers: Barrier  Paste      Pain  Pain Rating Scale  0 - No Pain  Pain Location     Pain Location Orientation     Pain Interventions   Declines    ADLs    Bathing   Shower  Linen Change   Complete  Personal Hygiene  Perineal Care, Moist Lara Wipes  Chlorhexidine Bath      Oral Care  Brushed Teeth  Teeth/Dentures     Shave     Nutrition Percentage Eaten  Dinner, Between 50-75% Consumed  Environmental Precautions     Patient Turns/Positioning  Patient Turns Self from Side to Side  Patient Turns Assistance/Tolerance  General Weakness  Bed Positions  Bed Controls On, Bed Locked  Head of Bed Elevated  Self regulated      Psychosocial/Neurologic Assessment  Psychosocial Assessment  Psychosocial (WDL):  WDL Except  Patient Behaviors: Confused  Neurologic Assessment  Neuro (WDL): Exceptions to WDL  Level of Consciousness: Alert  Orientation Level: Disoriented to time, Disoriented to situation  Cognition: Follows commands  Speech: Clear  Pupil Assesment: Yes  R Pupil Size (mm): 2  R Pupil Shape / Description: Round  R Pupil Reaction: Brisk  L Pupil Size (mm): 2  L Pupil Shape / Description: Round  L Pupil Reaction: Brisk  EENT (WDL):  WDL Except    Cardio/Pulmonary Assessment  Edema      Respiratory Breath Sounds  RUL Breath Sounds: Clear  RML Breath Sounds: Clear  RLL Breath Sounds: Diminished  ADOLFO Breath Sounds: Clear  LLL Breath Sounds: Diminished  Cardiac Assessment   Cardiac (WDL):  WDL Except

## 2023-12-23 ENCOUNTER — APPOINTMENT (OUTPATIENT)
Dept: SPEECH THERAPY | Facility: REHABILITATION | Age: 73
DRG: 052 | End: 2023-12-23
Attending: PHYSICAL MEDICINE & REHABILITATION
Payer: MEDICARE

## 2023-12-23 ENCOUNTER — APPOINTMENT (OUTPATIENT)
Dept: PHYSICAL THERAPY | Facility: REHABILITATION | Age: 73
DRG: 052 | End: 2023-12-23
Attending: PHYSICAL MEDICINE & REHABILITATION
Payer: MEDICARE

## 2023-12-23 ENCOUNTER — APPOINTMENT (OUTPATIENT)
Dept: OCCUPATIONAL THERAPY | Facility: REHABILITATION | Age: 73
DRG: 052 | End: 2023-12-23
Attending: PHYSICAL MEDICINE & REHABILITATION
Payer: MEDICARE

## 2023-12-23 PROCEDURE — 700111 HCHG RX REV CODE 636 W/ 250 OVERRIDE (IP): Mod: JZ | Performed by: PHYSICAL MEDICINE & REHABILITATION

## 2023-12-23 PROCEDURE — 97112 NEUROMUSCULAR REEDUCATION: CPT

## 2023-12-23 PROCEDURE — 97129 THER IVNTJ 1ST 15 MIN: CPT

## 2023-12-23 PROCEDURE — 770010 HCHG ROOM/CARE - REHAB SEMI PRIVAT*

## 2023-12-23 PROCEDURE — A9270 NON-COVERED ITEM OR SERVICE: HCPCS | Performed by: PHYSICAL MEDICINE & REHABILITATION

## 2023-12-23 PROCEDURE — 97130 THER IVNTJ EA ADDL 15 MIN: CPT

## 2023-12-23 PROCEDURE — 700102 HCHG RX REV CODE 250 W/ 637 OVERRIDE(OP): Performed by: PHYSICAL MEDICINE & REHABILITATION

## 2023-12-23 PROCEDURE — 97535 SELF CARE MNGMENT TRAINING: CPT

## 2023-12-23 PROCEDURE — 97116 GAIT TRAINING THERAPY: CPT

## 2023-12-23 RX ADMIN — CALCIUM CARBONATE (ANTACID) CHEW TAB 500 MG 500 MG: 500 CHEW TAB at 08:48

## 2023-12-23 RX ADMIN — ENOXAPARIN SODIUM 40 MG: 100 INJECTION SUBCUTANEOUS at 18:06

## 2023-12-23 RX ADMIN — METOPROLOL TARTRATE 25 MG: 25 TABLET, FILM COATED ORAL at 18:06

## 2023-12-23 RX ADMIN — OMEPRAZOLE 20 MG: 20 CAPSULE, DELAYED RELEASE ORAL at 08:47

## 2023-12-23 RX ADMIN — AMLODIPINE BESYLATE 2.5 MG: 5 TABLET ORAL at 05:32

## 2023-12-23 RX ADMIN — Medication 3 MG: at 18:06

## 2023-12-23 RX ADMIN — LISINOPRIL 10 MG: 5 TABLET ORAL at 05:32

## 2023-12-23 RX ADMIN — SENNOSIDES AND DOCUSATE SODIUM 2 TABLET: 50; 8.6 TABLET ORAL at 19:33

## 2023-12-23 RX ADMIN — METOPROLOL TARTRATE 25 MG: 25 TABLET, FILM COATED ORAL at 05:32

## 2023-12-23 RX ADMIN — CALCIUM CARBONATE (ANTACID) CHEW TAB 500 MG 500 MG: 500 CHEW TAB at 19:33

## 2023-12-23 ASSESSMENT — PAIN DESCRIPTION - PAIN TYPE: TYPE: ACUTE PAIN

## 2023-12-23 ASSESSMENT — GAIT ASSESSMENTS
ASSISTIVE DEVICE: FRONT WHEEL WALKER
GAIT LEVEL OF ASSIST: STANDBY ASSIST
DISTANCE (FEET): 250
DEVIATION: BRADYKINETIC;DECREASED HEEL STRIKE

## 2023-12-23 NOTE — THERAPY
Occupational Therapy  Daily Treatment     Patient Name: Rin Yung  Age:  73 y.o., Sex:  female  Medical Record #: [id number]  Today's Date: 12/23/2023     Precautions  Precautions: (P) Fall Risk, Lumbosacral Corset, Spinal / Back Precautions   Comments: (P) LSO OOB, baseline cognitive deficits    Subjective    Patient in bed upon arrival, agreeable to participate in OT.      Objective     12/23/23 1301   OT Charge Group   OT Self Care / ADL (Units) 4   OT Total Time Spent   OT Individual Total Time Spent (Mins) 60   Precautions   Precautions Fall Risk;Lumbosacral Corset;Spinal / Back Precautions    Comments LSO OOB, baseline cognitive deficits   Vitals   O2 Delivery Device None - Room Air   Functional Level of Assist   Bathing Contact Guard Assist  (w/ LH sponge to facilitate independence w/ bathing tasks and maximize adherence to spinal precautions)   Upper Body Dressing Moderate Assist  (Min A to don hospital gown, mod A and mod-max cues to don LSO)   Lower Body Dressing Minimal Assist  (to don disposable briefs, scrub pants and non skid socks (w/ sock aid))   Bed, Chair, Wheelchair Transfer Standby Assist   Tub / Shower Transfers Contact Guard Assist  (FWW level (bed > shower bench))   Interdisciplinary Plan of Care Collaboration   Patient Position at End of Therapy Seated;Self Releasing Lap Belt Applied;Call Light within Reach;Tray Table within Reach;Phone within Reach       Assessment    Patient tolerated OT session fair with focus on progression with ADLs/ functional cognition. Patient presents w/ impaired functional sequencing/problem solving and carryover. Mod-max cues required to correctly don LSO and recognize errors. CGA for FWW mobility. Benefits from using LH sponge to maximize LB bathing independence/adherence to spinal precautions.     Strengths: Able to follow instructions, Independent prior level of function, Motivated for self care and independence, Pleasant and cooperative, Supportive  family  Barriers: Decreased endurance, Fatigue, Generalized weakness, Impaired activity tolerance, Limited mobility    Plan    Cont overall strength/endurance and standing balance to improve ADL's and functional mobility      FT re: bathroom DME needs - tub transfer bench, hand-held shower head for tub shower, need for 24 hr supervision    DME  OT DME Recommendations  Bathroom Equipment: tub transfer bench, hand-held shower head      Pt lives in [place] alone, plan for sister to stay with pt at d/c, 1 story, tub shower, grab bars in shower    Occupational Therapy Goals (Active)       Problem: Bathing       Dates: Start:  12/20/23         Goal: STG-Within one week, patient will bathe with SBA.       Dates: Start:  12/20/23    Expected End:  01/12/24         Goal Note filed on 12/21/23 0813 by Carri Marroquin MS,OTR/L       New admit                 Problem: Dressing       Dates: Start:  12/20/23         Goal: STG-Within one week, patient will dress UB with Min A.       Dates: Start:  12/20/23    Expected End:  01/12/24         Goal Note filed on 12/21/23 0813 by Carri Marroquin MS,OTR/L       New admit              Goal: STG-Within one week, patient will dress LB with Min A.       Dates: Start:  12/20/23    Expected End:  01/12/24         Goal Note filed on 12/21/23 0813 by Carri Marroquin MS,OTR/L       New admit                 Problem: Functional Transfers       Dates: Start:  12/20/23         Goal: STG-Within one week, patient will transfer to toilet with SBA.       Dates: Start:  12/20/23    Expected End:  01/12/24         Goal Note filed on 12/21/23 0813 by Carri Marroquin MS,OTR/L       New admit              Goal: STG-Within one week, patient will transfer to step in shower with SBA.       Dates: Start:  12/20/23    Expected End:  01/12/24         Goal Note filed on 12/21/23 0813 by Carri Marroquin MS,OTR/L       New admit                 Problem: IADL's       Dates: Start:  12/20/23          Goal: STG-Within one week, patient will prepare a meal with CGA.       Dates: Start:  12/20/23    Expected End:  01/12/24         Goal Note filed on 12/21/23 0813 by Carri Marroquin MS,OTR/L       New admit                 Problem: OT Long Term Goals       Dates: Start:  12/20/23         Goal: LTG-By discharge, patient will complete basic self care tasks with Mod Independent level.       Dates: Start:  12/20/23    Expected End:  01/12/24            Goal: LTG-By discharge, patient will perform bathroom transfers with Mod Independent level.       Dates: Start:  12/20/23    Expected End:  01/12/24

## 2023-12-23 NOTE — CARE PLAN
"The patient is Stable - Low risk of patient condition declining or worsening    Shift Goals  Clinical Goals: Safety,  Patient Goals: safety    Problem: Skin Integrity  Goal: Skin integrity is maintained or improved  Outcome: Progressing  Note:   Michael Score: 16    Patient's skin remains intact and free from new or accidental injury this shift; no s/s of infection. RN wound protocol checked. Encouraged hydration and educated about the importance of nutrition to keep skin integrity. Will continue to monitor.      Problem: Fall Risk - Rehab  Goal: Patient will remain free from falls  Outcome: Progressing  Note: Marlene Stratton Fall risk Assessment Score: 14    High fall risk Interventions   - Bed and strip alarm   - Yellow sign by the door   - Yellow wrist band \"Fall risk\"  - Room near to the nurse station  - Do not leave patient unattended in the bathroom  - Fall risk education provided       "

## 2023-12-23 NOTE — CARE PLAN
The patient is Stable - Low risk of patient condition declining or worsening    Shift Goals  Clinical Goals: Safety,  Patient Goals: safety    Progress made toward(s) clinical / shift goals:      Problem: Knowledge Deficit - Standard  Goal: Patient and family/care givers will demonstrate understanding of plan of care, disease process/condition, diagnostic tests and medications  Outcome: Progressing  Note: Patient educated on plan and goals of care and disease process. Education provided on medications, procedures, and equipment. Will continue to re-inforce education when required. All questions and concerns answered at this time.     Problem: Skin Integrity  Goal: Skin integrity is maintained or improved  Outcome: Progressing  Note: Preventative measures in place:  Q 2 hour turns, pillows in place for support and cushioning, heels floated, 2 RN skin assessments, ensuring medical devices/tubing are not under patient, pressure redistribution mattress/low air loss mattress, mobility as tolerated, skin assessed under bony prominences and high pressure point areas,              Problem: Fall Risk - Rehab  Goal: Patient will remain free from falls  Outcome: Progressing  Note: Bed in lowest and locked position, call light is within reach, bed alarm is on, treaded socks in place. Patient oriented to room, plan of care and educated to use call light for assistance. Patient educated to not get out of bed without staff present.     Problem: Nutrition  Goal: Patient's nutritional and fluid intake will be adequate or improve  Outcome: Progressing

## 2023-12-23 NOTE — THERAPY
Speech Language Pathology  Daily Treatment     Patient Name: Rin Yung  Age:  73 y.o., Sex:  female  Medical Record #: [id number]  Today's Date: 12/23/2023     Precautions  Precautions: Fall Risk, Lumbosacral Corset, Spinal / Back Precautions   Comments: LSO OOB, baseline cognitive deficits    Subjective    Patient pleasant and agreeable to SLP services.     Objective       12/23/23 1431   Treatment Charges   SLP Cognitive Skill Development First 15 Minutes 1   SLP Cognitive Skill Development Additional 15 Minutes 1   SLP Total Time Spent   SLP Individual Total Time Spent (Mins) 30   Precautions   Precautions Fall Risk;Lumbosacral Corset;Spinal / Back Precautions    Comments LSO OOB, baseline cognitive deficits   Cognition   Functional Math / Financial Management Moderate (3)   Interdisciplinary Plan of Care Collaboration   IDT Collaboration with  Certified Nursing Assistant   Patient Position at End of Therapy Seated;Chair Alarm On;Call Light within Reach;Other (Comments)  (CNA in room)   Collaboration Comments CNA present at the end of session to assist pt c transfer to bed       Assessment    Patient reports that she has been having anxiety regarding managing her finances upon d/c, specifically related to maintaining her independence with management upon d/c. Pt expressed that her sister would be able to help her, and that she is planning on using a [employer] type of software to manage her finances upon d/c. Discussed recommendation for supervision and/or assistance with finances upon d/c; pt reluctantly expressed agreement.    Strengths: Alert and oriented, Motivated for self care and independence, Pleasant and cooperative, Supportive family, Willingly participates in therapeutic activities  Barriers: Impaired carryover of learning, Impaired functional cognition    Plan    Financial Management Tasks  Medication Management Tasks    Passport items to be completed:  Express basic needs, understand food/liquid  recommendations, consistently follow swallow precautions, manage finances, manage medications, arrive to therapy appointments on time, complete daily memory log entries, solve problems related to safety situations, review education related to hospitalization, complete caregiver training     Speech Therapy Problems (Active)       Problem: Memory STGs       Dates: Start:  12/20/23         Goal: STG-Within one week, patient will recall new information related to her current hospitalization with hamzah ANTONY.         Dates: Start:  12/20/23    Expected End:  01/12/24         Goal Note filed on 12/21/23 0805 by Pilo Dominguez MS,CCC-SLP       Pt evaluated yesterday, continue to target                  Problem: Problem Solving STGs       Dates: Start:  12/20/23         Goal: STG-Within one week, patient will complete medication management tasks given min A to achieve 80% accuracy.        Dates: Start:  12/20/23    Expected End:  01/12/24         Goal Note filed on 12/21/23 0805 by Pilo Dominguez MS,CCC-SLP       Pt evaluated yesterday, continue to target               Goal: STG-Within one week, patient will complete administration of the SCCAN.         Dates: Start:  12/20/23    Expected End:  01/12/24         Goal Note filed on 12/21/23 0805 by Pilo Dominguez MS,CCC-SLP       Pt evaluated yesterday, continue to target                  Problem: Speech/Swallowing LTGs       Dates: Start:  12/20/23         Goal: LTG-By discharge, patient will solve complex problems with spv.        Dates: Start:  12/20/23    Expected End:  01/12/24

## 2023-12-23 NOTE — PROGRESS NOTES
NURSING DAILY NOTE    Name: Rin Yung   Date of Admission: 12/19/2023   Admitting Diagnosis: Acute metabolic encephalopathy  Attending Physician: Randy Mcclain M.d.  Allergies: Patient has no allergy information on record.    Safety  Patient Assist  CGA  Patient Precautions  Fall Risk, Lumbosacral Corset, Spinal / Back Precautions   Precaution Comments  LSO OOB, baseline cognitive deficits  Bed Transfer Status  Standby Assist  Toilet Transfer Status   Standby Assist (with FWW)  Assistive Devices  Rails, Wheelchair  Oxygen  None - Room Air  Diet/Therapeutic Dining  Current Diet Order   Procedures    Diet Order Diet: Regular     Pill Administration  whole  Agitated Behavioral Scale     ABS Level of Severity       Fall Risk  Has the patient had a fall this admission?   No  Marlene Stratton Fall Risk Scoring  14, MODERATE RISK  Fall Risk Safety Measures  bed alarm and chair alarm    Vitals  Temperature: 37.2 °C (98.9 °F)  Temp src: Oral  Pulse: 85  Respiration: 18  Blood Pressure : (!) 150/98 (RN Aware)  Blood Pressure MAP (Calculated): 115 MM HG  BP Location: Left, Upper Arm  Patient BP Position: Ferguson's Position     Oxygen  Pulse Oximetry: 97 %  O2 (LPM): 0  O2 Delivery Device: None - Room Air    Bowel and Bladder  Last Bowel Movement  12/22/23  Stool Type  Type 4: Like a sausage or snake, smooth and soft  Bowel Device     Continent  Bladder: Continent void   Bowel: Continent movement  Bladder Function  Urine Void (mL):  (large)  Number of Times Voided: 1  Urine Color: Yellow  Genitourinary Assessment   Bladder Assessment (WDL):  WDL Except  Urine Color: Yellow  Time Void: Yes  Bladder Scan: Post Void  $ Bladder Scan Results (mL): 22    Skin  Michael Score   16  Sensory Interventions   Bed Types: Standard/Trauma Mattress  Skin Preventative Measures: Pillows in Use to Float Heels  Moisture Interventions  Moisturizers/Barriers: Barrier Paste      Pain  Pain  Rating Scale  0 - No Pain  Pain Location     Pain Location Orientation     Pain Interventions   Declines    ADLs    Bathing   Shower  Linen Change   Partial  Personal Hygiene  Perineal Care, Moist Lara Wipes  Chlorhexidine Bath      Oral Care  Brushed Teeth  Teeth/Dentures     Shave     Nutrition Percentage Eaten  Lunch, Between 50-75% Consumed  Environmental Precautions  Bed in Low Position, Personal Belongings, Wastebasket, Call Bell etc. in Easy Reach  Patient Turns/Positioning  Patient Turns Self from Side to Side  Patient Turns Assistance/Tolerance  General Weakness  Bed Positions  Bed Controls On, Bed Locked  Head of Bed Elevated  Self regulated      Psychosocial/Neurologic Assessment  Psychosocial Assessment  Psychosocial (WDL):  WDL Except  Patient Behaviors: Confused  Neurologic Assessment  Neuro (WDL): Exceptions to WDL  Level of Consciousness: Alert  Orientation Level: Disoriented to time, Disoriented to situation  Cognition: Follows commands, Confused in conversation  Speech: Clear  Pupil Assesment: Yes  R Pupil Size (mm): 2  R Pupil Shape / Description: Round  R Pupil Reaction: Brisk  L Pupil Size (mm): 2  L Pupil Shape / Description: Round  L Pupil Reaction: Brisk  EENT (WDL):  WDL Except    Cardio/Pulmonary Assessment  Edema      Respiratory Breath Sounds  RUL Breath Sounds: Clear  RML Breath Sounds: Clear  RLL Breath Sounds: Diminished  ADOLFO Breath Sounds: Clear  LLL Breath Sounds: Diminished  Cardiac Assessment   Cardiac (WDL):  WDL Except

## 2023-12-23 NOTE — PROGRESS NOTES
NURSING DAILY NOTE    Name: Rin Yung   Date of Admission: 12/19/2023   Admitting Diagnosis: Acute metabolic encephalopathy  Attending Physician: Randy Mcclain M.d.  Allergies: Patient has no allergy information on record.    Safety  Patient Assist  CGA  Patient Precautions  Fall Risk, Lumbosacral Corset, Spinal / Back Precautions   Precaution Comments  LSO OOB, baseline cognitive deficits  Bed Transfer Status  Standby Assist  Toilet Transfer Status   Standby Assist (with FWW)  Assistive Devices  Rails, Wheelchair  Oxygen  None - Room Air  Diet/Therapeutic Dining  Current Diet Order   Procedures    Diet Order Diet: Regular     Pill Administration  whole  Agitated Behavioral Scale     ABS Level of Severity       Fall Risk  Has the patient had a fall this admission?   No  Marlene Stratton Fall Risk Scoring  14, MODERATE RISK  Fall Risk Safety Measures  bed alarm and chair alarm    Vitals  Temperature: 37.2 °C (98.9 °F)  Temp src: Oral  Pulse: 85  Respiration: 18  Blood Pressure : (!) 150/98 (RN Aware)  Blood Pressure MAP (Calculated): 115 MM HG  BP Location: Left, Upper Arm  Patient BP Position: Ferguson's Position     Oxygen  Pulse Oximetry: 97 %  O2 (LPM): 0  O2 Delivery Device: None - Room Air    Bowel and Bladder  Last Bowel Movement  12/22/23  Stool Type  Type 4: Like a sausage or snake, smooth and soft  Bowel Device     Continent  Bladder: Continent void   Bowel: Continent movement  Bladder Function  Urine Void (mL):  (large)  Number of Times Voided: 1  Urine Color: Yellow  Genitourinary Assessment   Bladder Assessment (WDL):  WDL Except  Urine Color: Yellow  Time Void: Yes  Bladder Scan: Post Void  $ Bladder Scan Results (mL): 22    Skin  Michael Score   16  Sensory Interventions   Bed Types: Standard/Trauma Mattress  Skin Preventative Measures: Pillows in Use for Support / Positioning  Moisture Interventions  Moisturizers/Barriers: Barrier  Paste      Pain  Pain Rating Scale  0 - No Pain  Pain Location     Pain Location Orientation     Pain Interventions   Declines    ADLs    Bathing   Shower  Linen Change   Partial  Personal Hygiene  Perineal Care, Moist Lara Wipes  Chlorhexidine Bath      Oral Care  Brushed Teeth  Teeth/Dentures     Shave     Nutrition Percentage Eaten  Lunch, Between 50-75% Consumed  Environmental Precautions  Bed in Low Position  Patient Turns/Positioning  Patient Turns Self from Side to Side  Patient Turns Assistance/Tolerance  General Weakness  Bed Positions  Bed Controls On, Bed Locked  Head of Bed Elevated  Self regulated      Psychosocial/Neurologic Assessment  Psychosocial Assessment  Psychosocial (WDL):  WDL Except  Patient Behaviors: Confused  Neurologic Assessment  Neuro (WDL): Exceptions to WDL  Level of Consciousness: Alert  Orientation Level: Disoriented to time, Disoriented to situation  Cognition: Follows commands  Speech: Clear  Pupil Assesment: Yes  R Pupil Size (mm): 2  R Pupil Shape / Description: Round  R Pupil Reaction: Brisk  L Pupil Size (mm): 2  L Pupil Shape / Description: Round  L Pupil Reaction: Brisk  EENT (WDL):  WDL Except    Cardio/Pulmonary Assessment  Edema      Respiratory Breath Sounds  RUL Breath Sounds: Clear  RML Breath Sounds: Clear  RLL Breath Sounds: Diminished  ADOLFO Breath Sounds: Clear  LLL Breath Sounds: Diminished  Cardiac Assessment   Cardiac (WDL):  WDL Except

## 2023-12-23 NOTE — THERAPY
Physical Therapy   Daily Treatment     Patient Name: Rin Yung  Age:  73 y.o., Sex:  female  Medical Record #: [id number]  Today's Date: 12/23/2023     Precautions  Precautions: Fall Risk, Lumbosacral Corset, Spinal / Back Precautions   Comments: LSO OOB, baseline cognitive deficits    Subjective    Pt expressing concern over finding ST services in [place]     Objective       12/23/23 0930   PT Charge Group   PT Gait Training (Units) 1   PT Neuromuscular Re-Education / Balance (Units) 1   PT Total Time Spent   PT Individual Total Time Spent (Mins) 30   Gait Functional Level of Assist    Gait Level Of Assist Standby Assist   Assistive Device Front Wheel Walker   Distance (Feet) 250   # of Times Distance was Traveled 2   Deviation Bradykinetic;Decreased Heel Strike   Sitting Lower Body Exercises   Nustep Resistance Level 5  (5 min 280 steps)   Bed Mobility    Supine to Sit Supervised   Sit to Stand Standby Assist   Neuro-Muscular Treatments   Comments see note   Interdisciplinary Plan of Care Collaboration   IDT Collaboration with  Nursing   Patient Position at End of Therapy Seated;Chair Alarm On;Self Releasing Lap Belt Applied;Call Light within Reach;Tray Table within Reach;Phone within Reach   Collaboration Comments POC     Theresa to louann LSO seated EOB  Dyanmic gait fwd/retro no UE support 10ftx3 CGA, sidestepping B UE support 10ftx2, tandem walking light UE support CGA  Standing balance: NBOS c head turns L/R and up/down SBA;   gait HHA 200ft    Assessment    Pt is making steady progress toward her goals. She is much steadier and safer with FWW compared no AD. Pt continues to be limited by decr memory and confusion  Strengths: Motivated for self care and independence, Willingly participates in therapeutic activities, Adequate strength  Barriers: Impaired balance, Impaired activity tolerance, Decreased endurance, Generalized weakness, Fatigue, Limited mobility    Plan    Balance, gait, safety awareness,  cardiovascular endurance    Assess stair negotiation    DME  PT DME Recommendations  Wheelchair:  (no AD anticipated)    Passport items to be completed:  Get in/out of bed safely, in/out of a vehicle, safely use mobility device, walk or wheel around home/community, navigate up and down stairs, show how to get up/down from the ground, ensure home is accessible, demonstrate HEP, complete caregiver training    Physical Therapy Problems (Active)       Problem: Balance       Dates: Start:  12/20/23         Goal: STG-Within one week, patient will maintain dynamic standing       Dates: Start:  12/20/23    Expected End:  01/12/24       Description: In parallel bars on varying surfaces 1min x 4 with SPV      Goal Note filed on 12/20/23 1444 by Angelica Cardoso MSPT        parallel bars on varying surfaces to challenge balance 1min x 4 with SPV                 Problem: Mobility       Dates: Start:  12/20/23         Goal: STG-Within one week, patient will ambulate household distance no AD SPV        Dates: Start:  12/20/23    Expected End:  01/12/24            Goal: STG-Within one week, patient will ambulate up/down a curb with no AD CGA       Dates: Start:  12/20/23    Expected End:  01/12/24               Problem: PT-Long Term Goals       Dates: Start:  12/20/23         Goal: LTG-By discharge, patient will ambulate 50ft x 4 with no AD mod I       Dates: Start:  12/20/23    Expected End:  01/12/24            Goal: LTG-By discharge, patient will transfer one surface to another mod I SPT safely and consistently       Dates: Start:  12/20/23    Expected End:  01/12/24            Goal: LTG-By discharge, patient will perform home exercise program seated/standing there-ex for LE strengthening to be done 3x/day in safe, independent home environment       Dates: Start:  12/20/23    Expected End:  01/12/24            Goal: LTG-By discharge, patient will up/down threshold step for home entrance with no AD SPV       Dates:  Start:  12/20/23    Expected End:  01/12/24

## 2023-12-24 ENCOUNTER — APPOINTMENT (OUTPATIENT)
Dept: PHYSICAL THERAPY | Facility: REHABILITATION | Age: 73
DRG: 052 | End: 2023-12-24
Attending: PHYSICAL MEDICINE & REHABILITATION
Payer: MEDICARE

## 2023-12-24 ENCOUNTER — APPOINTMENT (OUTPATIENT)
Dept: SPEECH THERAPY | Facility: REHABILITATION | Age: 73
DRG: 052 | End: 2023-12-24
Attending: PHYSICAL MEDICINE & REHABILITATION
Payer: MEDICARE

## 2023-12-24 ENCOUNTER — APPOINTMENT (OUTPATIENT)
Dept: OCCUPATIONAL THERAPY | Facility: REHABILITATION | Age: 73
DRG: 052 | End: 2023-12-24
Attending: PHYSICAL MEDICINE & REHABILITATION
Payer: MEDICARE

## 2023-12-24 PROCEDURE — 97116 GAIT TRAINING THERAPY: CPT

## 2023-12-24 PROCEDURE — 97530 THERAPEUTIC ACTIVITIES: CPT

## 2023-12-24 PROCEDURE — 700102 HCHG RX REV CODE 250 W/ 637 OVERRIDE(OP): Performed by: PHYSICAL MEDICINE & REHABILITATION

## 2023-12-24 PROCEDURE — 97112 NEUROMUSCULAR REEDUCATION: CPT

## 2023-12-24 PROCEDURE — 770010 HCHG ROOM/CARE - REHAB SEMI PRIVAT*

## 2023-12-24 PROCEDURE — 97110 THERAPEUTIC EXERCISES: CPT

## 2023-12-24 PROCEDURE — 700111 HCHG RX REV CODE 636 W/ 250 OVERRIDE (IP): Mod: JZ | Performed by: PHYSICAL MEDICINE & REHABILITATION

## 2023-12-24 PROCEDURE — 97129 THER IVNTJ 1ST 15 MIN: CPT

## 2023-12-24 PROCEDURE — 97130 THER IVNTJ EA ADDL 15 MIN: CPT

## 2023-12-24 PROCEDURE — A9270 NON-COVERED ITEM OR SERVICE: HCPCS | Performed by: PHYSICAL MEDICINE & REHABILITATION

## 2023-12-24 RX ADMIN — AMLODIPINE BESYLATE 2.5 MG: 5 TABLET ORAL at 05:50

## 2023-12-24 RX ADMIN — METOPROLOL TARTRATE 25 MG: 25 TABLET, FILM COATED ORAL at 17:12

## 2023-12-24 RX ADMIN — METOPROLOL TARTRATE 25 MG: 25 TABLET, FILM COATED ORAL at 05:50

## 2023-12-24 RX ADMIN — ENOXAPARIN SODIUM 40 MG: 100 INJECTION SUBCUTANEOUS at 17:12

## 2023-12-24 RX ADMIN — LISINOPRIL 10 MG: 5 TABLET ORAL at 05:49

## 2023-12-24 RX ADMIN — CALCIUM CARBONATE (ANTACID) CHEW TAB 500 MG 500 MG: 500 CHEW TAB at 19:45

## 2023-12-24 RX ADMIN — SENNOSIDES AND DOCUSATE SODIUM 2 TABLET: 50; 8.6 TABLET ORAL at 19:45

## 2023-12-24 RX ADMIN — SENNOSIDES AND DOCUSATE SODIUM 2 TABLET: 50; 8.6 TABLET ORAL at 08:35

## 2023-12-24 RX ADMIN — Medication 3 MG: at 17:11

## 2023-12-24 RX ADMIN — OMEPRAZOLE 20 MG: 20 CAPSULE, DELAYED RELEASE ORAL at 08:34

## 2023-12-24 RX ADMIN — CALCIUM CARBONATE (ANTACID) CHEW TAB 500 MG 500 MG: 500 CHEW TAB at 08:35

## 2023-12-24 ASSESSMENT — FIBROSIS 4 INDEX
FIB4 SCORE: 0.74
FIB4 SCORE: 0.74

## 2023-12-24 ASSESSMENT — PATIENT HEALTH QUESTIONNAIRE - PHQ9
1. LITTLE INTEREST OR PLEASURE IN DOING THINGS: NOT AT ALL
SUM OF ALL RESPONSES TO PHQ9 QUESTIONS 1 AND 2: 0
2. FEELING DOWN, DEPRESSED, IRRITABLE, OR HOPELESS: NOT AT ALL

## 2023-12-24 ASSESSMENT — PAIN DESCRIPTION - PAIN TYPE
TYPE: ACUTE PAIN
TYPE: ACUTE PAIN

## 2023-12-24 ASSESSMENT — GAIT ASSESSMENTS
GAIT LEVEL OF ASSIST: SUPERVISED
DISTANCE (FEET): 850

## 2023-12-24 ASSESSMENT — 6 MINUTE WALK TEST (6MWT)
TOTAL DISTANCE WALKED (FT): 770
GAIT SPEED - METERS PER SECOND: 0.65
LEVEL OF ASSISTANCE: SUPERVISION
NUMBER OF RESTS: 0

## 2023-12-24 NOTE — CARE PLAN
"  Problem: Fall Risk - Rehab  Goal: Patient will remain free from falls  Note: Marlene Stratton Fall risk Assessment Score: 11      Moderate fall risk Interventions  - Bed and strip alarm   - Yellow sign by the door   - Yellow wrist band \"Fall risk\"  - Room near to the nurse station  - Do not leave patient unattended in the bathroom  - Fall risk education provided    No complains of pain at this time, will monitor.      The patient is Watcher - Medium risk of patient condition declining or worsening    Shift Goals  Clinical Goals: Safety,  Patient Goals: safety        "

## 2023-12-24 NOTE — CARE PLAN
Problem: Knowledge Deficit - Standard  Goal: Patient and family/care givers will demonstrate understanding of plan of care, disease process/condition, diagnostic tests and medications  Outcome: Progressing   Pt education given regarding plan of care with emphasis on adequate hydration, pt shows moderate understanding and follow through, will continue to reinforce education and continue to monitor.         Problem: Fall Risk - Rehab  Goal: Patient will remain free from falls  Outcome: Progressing   Pt education given regarding fall precautions AND safety measures, pt shows good understanding, has not attempted to self transfer this shift, will continue to reinforce education and continue to monitor.

## 2023-12-24 NOTE — PROGRESS NOTES
NURSING DAILY NOTE    Name: Rin Yung   Date of Admission: 12/19/2023   Admitting Diagnosis: Acute metabolic encephalopathy  Attending Physician: Randy Mcclain M.d.  Allergies: Patient has no allergy information on record.    Safety  Patient Assist  min to cga  Patient Precautions  Fall Risk, Lumbosacral Corset, Spinal / Back Precautions   Precaution Comments  LSO OOB, baseline cognitive deficits  Bed Transfer Status  Standby Assist  Toilet Transfer Status   Standby Assist (with FWW)  Assistive Devices  Walker - front wheel  Oxygen  None - Room Air  Diet/Therapeutic Dining  Current Diet Order   Procedures    Diet Order Diet: Regular     Pill Administration  whole  Agitated Behavioral Scale     ABS Level of Severity       Fall Risk  Has the patient had a fall this admission?   No  Marlene Stratton Fall Risk Scoring  11, MODERATE RISK  Fall Risk Safety Measures  bed alarm and chair alarm    Vitals  Temperature: 36.7 °C (98.1 °F)  Temp src: Oral  Pulse: 70  Respiration: 18  Blood Pressure : 100/65  Blood Pressure MAP (Calculated): 77 MM HG  BP Location: Left, Upper Arm  Patient BP Position: Supine     Oxygen  Pulse Oximetry: 95 %  O2 (LPM): 0  O2 Delivery Device: None - Room Air    Bowel and Bladder  Last Bowel Movement  12/24/23  Stool Type  Type 6: Fluffy pieces with ragged edges, a mushy stool  Bowel Device     Continent  Bladder: Continent void   Bowel: Continent movement  Bladder Function  Urine Void (mL):  (large)  Number of Times Voided: 1  Urine Color:  (large)  Genitourinary Assessment   Bladder Assessment (WDL):  WDL Except  Urine Color:  (large)  Time Void: Yes  Bladder Scan: Post Void  $ Bladder Scan Results (mL): 22    Skin  Michael Score   18  Sensory Interventions   Bed Types: Standard/Trauma Mattress  Skin Preventative Measures: Pillows in Use for Support / Positioning  Moisture Interventions  Moisturizers/Barriers: Barrier Wipes, Barrier  Paste      Pain  Pain Rating Scale  0 - No Pain  Pain Location     Pain Location Orientation     Pain Interventions   Declines    ADLs    Bathing   Shower  Linen Change   Partial  Personal Hygiene  Perineal Care, Moist Lara Wipes  Chlorhexidine Bath      Oral Care  Brushed Teeth  Teeth/Dentures     Shave     Nutrition Percentage Eaten  Lunch, Between 50-75% Consumed  Environmental Precautions  Bed in Low Position  Patient Turns/Positioning  Patient Turns Self from Side to Side  Patient Turns Assistance/Tolerance  General Weakness  Bed Positions  Bed Controls On, Bed Locked  Head of Bed Elevated  Self regulated      Psychosocial/Neurologic Assessment  Psychosocial Assessment  Psychosocial (WDL):  WDL Except  Patient Behaviors: Confused  Neurologic Assessment  Neuro (WDL): Exceptions to WDL  Level of Consciousness: Alert  Orientation Level: Disoriented to time, Disoriented to situation  Cognition: Follows commands  Speech: Clear  Pupil Assesment: Yes  R Pupil Size (mm): 2  R Pupil Shape / Description: Round  R Pupil Reaction: Brisk  L Pupil Size (mm): 2  L Pupil Shape / Description: Round  L Pupil Reaction: Brisk  EENT (WDL):  WDL Except    Cardio/Pulmonary Assessment  Edema      Respiratory Breath Sounds  RUL Breath Sounds: Clear  RML Breath Sounds: Clear  RLL Breath Sounds: Diminished  ADOLFO Breath Sounds: Clear  LLL Breath Sounds: Diminished  Cardiac Assessment   Cardiac (WDL):  WDL Except

## 2023-12-24 NOTE — CARE PLAN
"The patient is Stable - Low risk of patient condition declining or worsening    Shift Goals  Clinical Goals: Safety,  Patient Goals: safety    Progress made toward(s) clinical / shift goals:    Problem: Fall Risk - Rehab  Goal: Patient will remain free from falls  Outcome: Progressing     Marlene Stratton Fall risk Assessment : 11    Moderate fall risk Interventions  - Bed and strip alarm   - Yellow sign by the door   - Yellow wrist band \"Fall risk\"  - Room near to the nurse station  - Do not leave patient unattended in the bathroom  - Fall risk education provided    Pt uses call light consistently and appropriately. Waits for assistance does not attempt self transfer this shift. Able to verbalize needs.  "

## 2023-12-24 NOTE — PROGRESS NOTES
NURSING DAILY NOTE    Name: Rin Yung   Date of Admission: 12/19/2023   Admitting Diagnosis: Acute metabolic encephalopathy  Attending Physician: Randy Mcclain M.d.  Allergies: Patient has no allergy information on record.    Safety  Patient Assist  min to cga  Patient Precautions  Fall Risk, Lumbosacral Corset, Spinal / Back Precautions   Precaution Comments  LSO OOB, baseline cognitive deficits  Bed Transfer Status  Standby Assist  Toilet Transfer Status   Standby Assist (with FWW)  Assistive Devices  Rails, Wheelchair  Oxygen  None - Room Air  Diet/Therapeutic Dining  Current Diet Order   Procedures    Diet Order Diet: Regular     Pill Administration  whole  Agitated Behavioral Scale     ABS Level of Severity       Fall Risk  Has the patient had a fall this admission?   No  Marlene Stratton Fall Risk Scoring  11, MODERATE RISK  Fall Risk Safety Measures  bed alarm and chair alarm    Vitals  Temperature: 36.7 °C (98.1 °F)  Temp src: Oral  Pulse: 85  Respiration: 18  Blood Pressure : (!) 155/101  Blood Pressure MAP (Calculated): 119 MM HG  BP Location: Left, Upper Arm  Patient BP Position: Supine     Oxygen  Pulse Oximetry: 95 %  O2 (LPM): 0  O2 Delivery Device: None - Room Air    Bowel and Bladder  Last Bowel Movement  12/22/23  Stool Type  Type 4: Like a sausage or snake, smooth and soft  Bowel Device     Continent  Bladder: Continent void   Bowel: Continent movement  Bladder Function  Urine Void (mL):  (large)  Number of Times Voided: 1  Urine Color: Yellow  Genitourinary Assessment   Bladder Assessment (WDL):  WDL Except  Urine Color: Yellow  Time Void: Yes  Bladder Scan: Post Void  $ Bladder Scan Results (mL): 22    Skin  Michael Score   18  Sensory Interventions   Bed Types: Standard/Trauma Mattress  Skin Preventative Measures: Pillows in Use for Support / Positioning  Moisture Interventions  Moisturizers/Barriers: Barrier Wipes, Barrier  Paste      Pain  Pain Rating Scale  0 - No Pain  Pain Location     Pain Location Orientation     Pain Interventions   Declines    ADLs    Bathing   Shower  Linen Change   Partial  Personal Hygiene  Perineal Care, Moist Lara Wipes  Chlorhexidine Bath      Oral Care  Brushed Teeth  Teeth/Dentures     Shave     Nutrition Percentage Eaten  Lunch, Between 50-75% Consumed  Environmental Precautions  Bed in Low Position  Patient Turns/Positioning  Patient Turns Self from Side to Side  Patient Turns Assistance/Tolerance  General Weakness  Bed Positions  Bed Controls On, Bed Locked  Head of Bed Elevated  Self regulated      Psychosocial/Neurologic Assessment  Psychosocial Assessment  Psychosocial (WDL):  WDL Except  Patient Behaviors: Confused  Neurologic Assessment  Neuro (WDL): Exceptions to WDL  Level of Consciousness: Alert  Orientation Level: Disoriented to time, Disoriented to situation  Cognition: Follows commands  Speech: Clear  Pupil Assesment: Yes  R Pupil Size (mm): 2  R Pupil Shape / Description: Round  R Pupil Reaction: Brisk  L Pupil Size (mm): 2  L Pupil Shape / Description: Round  L Pupil Reaction: Brisk  EENT (WDL):  WDL Except    Cardio/Pulmonary Assessment  Edema      Respiratory Breath Sounds  RUL Breath Sounds: Clear  RML Breath Sounds: Clear  RLL Breath Sounds: Diminished  ADOLFO Breath Sounds: Clear  LLL Breath Sounds: Diminished  Cardiac Assessment   Cardiac (WDL):  WDL Except

## 2023-12-24 NOTE — THERAPY
Department of Anesthesiology  Preprocedure Note       Name:  India Brown   Age:  46 y.o.  :  1973                                          MRN:  [id number]         Date:  2021      Surgeon: Aylin Query):  Juan Manuel Pathak MD    Procedure: Procedure(s):  COLORECTAL CANCER SCREENING, NOT HIGH RISK    Medications prior to admission:   Prior to Admission medications    Medication Sig Start Date End Date Taking? Authorizing Provider   ondansetron (ZOFRAN ODT) 4 MG disintegrating tablet Take 1 tablet by mouth every 8 hours as needed for Nausea or Vomiting 21   Juan Manuel Pathak MD   polyethylene glycol [facility]) 17 GM/SCOOP powder Use as directed following your patient instructions given by office 21   Juan Manuel Pathak MD   bisacodyl (BISACODYL) 5 MG EC tablet Take 4 tabs at 10am the day prior to Colonoscopy 21   Juan Manuel Pathak MD   cephALEXin (KEFLEX) 500 MG capsule TAKE 1 CAPSULE BY MOUTH TWO TIMES A DAY UNTIL GONE 21   Historical Provider, MD   phentermine (ADIPEX-P) 37.5 MG capsule Take 1 capsule by mouth every morning for 30 days.  21  JULY Diaz CNP   venlafaxine (EFFEXOR XR) 75 MG extended release capsule Take 1 capsule by mouth daily 11/10/21   JULY Diaz CNP   meloxicam [facility]) 15 MG tablet Take 1 tablet by mouth daily as needed for Pain 10/21/21   JULY Diaz CNP   rOPINIRole (REQUIP) 0.25 MG tablet Take 1 tablet by mouth nightly 21   JULY Diaz CNP   Black Cohosh 20 MG TABS Take by mouth daily    Historical Provider, MD       Current medications:    Current Facility-Administered Medications   Medication Dose Route Frequency Provider Last Rate Last Admin    0.9 % sodium chloride infusion   IntraVENous Continuous Eric Franco MD        lactated ringers infusion   IntraVENous Continuous Eric Franco MD        sodium chloride flush 0.9 % injection 10 mL  10 mL IntraVENous 2 times per day Eric Franco MD        sodium chloride Occupational Therapy  Daily Treatment     Patient Name: Rin Yung  Age:  73 y.o., Sex:  female  Medical Record #: [id number]  Today's Date: 12/24/2023     Precautions  Precautions: Fall Risk, Lumbosacral Orthosis, Spinal / Back Precautions   Comments: LSO OOB, baseline cognitive deficits         Subjective    Pt seated in bed upon arrival, pleasant and cooperative, agreeable to therapy       Objective       12/24/23 1201   OT Charge Group   OT Neuromuscular Re-education / Balance (Units) 2   OT Therapy Activity (Units) 1   OT Therapeutic Exercise (Units) 3   OT Total Time Spent   OT Individual Total Time Spent (Mins) 90   Functional Level of Assist   Grooming Supervision;Standing   Toileting Standby Assist   Toilet Transfers Standby Assist  (with no AD)   Sitting Upper Body Exercises   Chest Press 3 sets of 10;Bilateral;Weight (See Comments for lbs)  (3lb dumbbell)   Internal Shoulder Rotation 3 sets of 10;Bilateral;Weight (See Comments for lbs)  (3lb dumbbell)   External Shoulder Rotation 3 sets of 10;Bilateral;Weight (See Comments for lbs)  (3lb dumbbell)   Bicep Curls 3 sets of 10;Bilateral;Weight (See Comments for lbs)  (3lb dumbbell)   Pronation / Supination 3 sets of 10;Bilateral;Weight (See Comments for lbs)  (3lb dumbbell)   Sitting Lower Body Exercises   Nustep Resistance Level 7  (UE+LE propulsion 10 min x 2)   Interdisciplinary Plan of Care Collaboration   Patient Position at End of Therapy In Bed;Bed Alarm On;Call Light within Reach;Tray Table within Reach     Functional mobility at FWW: SBA throughout indoors     Blocked practice don/doff  LSO:  - used mirror and demo back   - hands-on practice don/doff x 8    Standing balance in // bars: standing on foam pad, alt foot tap to cone with unilateral and no UE support, requires intermittent Min A     Assessment    Session once again focused on repetitive don/doff LSO mgmt - pt cont to require SBA - Min cues for sequencing by end of session. Will need  flush 0.9 % injection 10 mL  10 mL IntraVENous PRN Sharon Ruiz MD        0.9 % sodium chloride infusion  25 mL IntraVENous PRN Sharon Ruiz MD           Allergies: Allergies   Allergen Reactions    Betadine [Povidone Iodine]     Vicodin [Hydrocodone-Acetaminophen]     Adhesive Tape Rash       Problem List:    Patient Active Problem List   Diagnosis Code    Hypertrophy of breast N62    Bilateral mastodynia N64.4    Hot flashes R23.2    Hyperlipidemia E78.5    Joint swelling M25.40    Lateral epicondylitis of left elbow M77.12    Myopia of both eyes with astigmatism and presbyopia H52.13, H52.203, H52.4    Insomnia G47.00    Depression F32. A    BMI 30.0-30.9,adult Z68.30       Past Medical History:        Diagnosis Date    Hyperlipidemia     PONV (postoperative nausea and vomiting)     Seasonal allergies        Past Surgical History:        Procedure Laterality Date    APPENDECTOMY      DILATION AND CURETTAGE OF UTERUS  1993    DILATION AND CURETTAGE OF UTERUS  1997    HAND SURGERY Left 9/16/2021    EXCISION MUCOUS CYST LEFT INDEX FINGER WITH LOCAL FLAP CLOSURE performed by Jazzy Fernandez MD at [place] [id number] ARTHROSCOPY Right 03/26/90    TUBAL LIGATION  2001    WISDOM TOOTH EXTRACTION         Social History:    Social History     Tobacco Use    Smoking status: Never Smoker    Smokeless tobacco: Never Used   Substance Use Topics    Alcohol use: Yes     Comment: social                                Counseling given: Not Answered      Vital Signs (Current):   Vitals:    12/16/21 0926   BP: 115/72   Pulse: 67   Resp: 16   Temp: 97 °F (36.1 °C)   SpO2: 100%   Weight: 155 lb (70.3 kg)   Height: 5' 1\" (1.549 m)                                              BP Readings from Last 3 Encounters:   12/16/21 115/72   11/24/21 116/70   10/13/21 (!) 124/90       NPO Status: Time of last liquid consumption: 2100                        Time of last solid consumption: 1800 to edu family on LSO mgmt prior to d/c    Strengths: Able to follow instructions, Independent prior level of function, Motivated for self care and independence, Pleasant and cooperative, Supportive family  Barriers: Decreased endurance, Fatigue, Generalized weakness, Impaired activity tolerance, Limited mobility    Plan     Cont overall strength/endurance and standing balance to improve ADL's and functional mobility      FT re: bathroom DME needs - tub transfer bench, hand-held shower head for tub shower, need for 24 hr supervision     DME  OT DME Recommendations  Bathroom Equipment: tub transfer bench, hand-held shower head      Pt lives in [place] alone, plan for sister to stay with pt at d/c, 1 story, tub shower, grab bars in showe    Occupational Therapy Goals (Active)       Problem: Bathing       Dates: Start:  12/20/23         Goal: STG-Within one week, patient will bathe with SBA.       Dates: Start:  12/20/23    Expected End:  01/12/24         Goal Note filed on 12/21/23 0813 by Carri Marroquin MS,OTR/L       New admit                 Problem: Dressing       Dates: Start:  12/20/23         Goal: STG-Within one week, patient will dress UB with Min A.       Dates: Start:  12/20/23    Expected End:  01/12/24         Goal Note filed on 12/21/23 0813 by Carri Marroquin MS,OTR/L       New admit              Goal: STG-Within one week, patient will dress LB with Min A.       Dates: Start:  12/20/23    Expected End:  01/12/24         Goal Note filed on 12/21/23 0813 by Carri Marroquin MS,OTR/L       New admit                 Problem: Functional Transfers       Dates: Start:  12/20/23         Goal: STG-Within one week, patient will transfer to toilet with SBA.       Dates: Start:  12/20/23    Expected End:  01/12/24         Goal Note filed on 12/21/23 0813 by Carri Marroquin MS,OTR/L       New admit              Goal: STG-Within one week, patient will transfer to step in shower with SBA.        Date of last liquid consumption: 12/15/21                        Date of last solid food consumption: 12/14/21    BMI:   Wt Readings from Last 3 Encounters:   12/16/21 155 lb (70.3 kg)   12/08/21 155 lb (70.3 kg)   11/24/21 157 lb (71.2 kg)     Body mass index is 29.29 kg/m². CBC:   Lab Results   Component Value Date    WBC 6.1 02/08/2021    RBC 4.37 02/08/2021    HGB 13.7 02/08/2021    HCT 41.4 02/08/2021    MCV 94.7 02/08/2021    RDW 12.4 02/08/2021     02/08/2021       CMP:   Lab Results   Component Value Date     02/08/2021    K 4.4 02/08/2021     02/08/2021    CO2 26 02/08/2021    BUN 17 02/08/2021    CREATININE 0.82 02/08/2021    GFRAA >60 02/08/2021    LABGLOM >60 02/08/2021    GLUCOSE 83 02/08/2021    PROT 6.9 02/08/2021    CALCIUM 9.2 02/08/2021    BILITOT 0.16 02/08/2021    ALKPHOS 70 02/08/2021    AST 20 02/08/2021    ALT 16 02/08/2021       POC Tests: No results for input(s): POCGLU, POCNA, POCK, POCCL, POCBUN, POCHEMO, POCHCT in the last 72 hours. Coags: No results found for: PROTIME, INR, APTT    HCG (If Applicable):   Lab Results   Component Value Date    HCG NEGATIVE 12/16/2021        ABGs: No results found for: PHART, PO2ART, XJR5LNI, NJK4RGG, BEART, M6OBZESG     Type & Screen (If Applicable):  No results found for: LABABO, LABRH    Drug/Infectious Status (If Applicable):  No results found for: HIV, HEPCAB    COVID-19 Screening (If Applicable): No results found for: COVID19        Anesthesia Evaluation  Patient summary reviewed and Nursing notes reviewed   history of anesthetic complications: PONV.   Airway: Mallampati: II  TM distance: >3 FB   Neck ROM: full  Mouth opening: > = 3 FB Dental: normal exam         Pulmonary:Negative Pulmonary ROS and normal exam  breath sounds clear to auscultation                             Cardiovascular:    (+) hyperlipidemia        Rhythm: regular  Rate: normal                    Neuro/Psych:   (+) psychiatric Dates: Start:  12/20/23    Expected End:  01/12/24         Goal Note filed on 12/21/23 0813 by Carri Marroquin, MS,OTR/L       New admit                 Problem: IADL's       Dates: Start:  12/20/23         Goal: STG-Within one week, patient will prepare a meal with CGA.       Dates: Start:  12/20/23    Expected End:  01/12/24         Goal Note filed on 12/21/23 0813 by Carri Marroquin, MS,OTR/L       New admit                 Problem: OT Long Term Goals       Dates: Start:  12/20/23         Goal: LTG-By discharge, patient will complete basic self care tasks with Mod Independent level.       Dates: Start:  12/20/23    Expected End:  01/12/24            Goal: LTG-By discharge, patient will perform bathroom transfers with Mod Independent level.       Dates: Start:  12/20/23    Expected End:  01/12/24               history:depression/anxiety             GI/Hepatic/Renal:   (+) bowel prep,           Endo/Other: Negative Endo/Other ROS                    Abdominal:       Abdomen: soft. Vascular: negative vascular ROS. Other Findings:             Anesthesia Plan      MAC     ASA 2             Anesthetic plan and risks discussed with patient. Plan discussed with CRNA.                   Jovanny Gonzales MD   12/16/2021

## 2023-12-24 NOTE — THERAPY
Physical Therapy   Daily Treatment     Patient Name: Rin Yung  Age:  73 y.o., Sex:  female  Medical Record #: [id number]  Today's Date: 12/24/2023     Precautions  Precautions: Fall Risk, Lumbosacral Orthosis, Spinal / Back Precautions   Comments: LSO OOB, baseline cognitive deficits    Subjective    Pt received in room in bed, her son is present.      Objective       12/24/23 0831   PT Charge Group   PT Gait Training (Units) 1   PT Therapeutic Exercise (Units) 2   PT Therapeutic Activities (Units) 1   PT Total Time Spent   PT Individual Total Time Spent (Mins) 60   Precautions   Precautions Fall Risk;Lumbosacral Orthosis;Spinal / Back Precautions    Comments LSO OOB, baseline cognitive deficits   Vitals   Pulse 71   Patient BP Position Sitting   Blood Pressure  (!) 126/94   Room Air Oximetry 97   Vitals Comments immediatley following 6MWT   Cognition    Level of Consciousness Alert   Sleep/Wake Cycle   Sleep & Rest Awake   Gait Functional Level of Assist    Gait Level Of Assist Supervised   Assistive Device None   Distance (Feet) 850  (+150', +100')   # of Times Distance was Traveled   (ambulatory through session.)   Deviation   (slightly widened SARA, inc time in DLS, reduced dennis)   Stairs Functional Level of Assist   Level of Assist with Stairs Standby Assist   # of Stairs Climbed 12   Stairs Description Hand rails;Extra time;Supervision for safety  (mix of step-to and reciprocal pattern)   Transfer Functional Level of Assist   Bed, Chair, Wheelchair Transfer Supervised   Toilet Transfers Supervised  (SPV for hygiene)   Sitting Lower Body Exercises   Sit to Stand 3 sets of 10  (3x10 reps unsupported: from std height chair. 2x10 reps with focus on eccentric lowering. 3rd set with green hip ABD band around knees)   Nustep Resistance Level 3  (LE propulsion x8 min, 0.30 miles completed)   Bed Mobility    Sit to Stand Supervised   Neuro-Muscular Treatments   Comments reviewed spinal prec, pt immediately  "verbalizes \"BLT\" however required increased time to state that \"T\" stands for no twisting.   6 Minute Walk Test   Distance (feet) 770   Gait Speed (meters per second) 0.65   Number of Rests 0   Level of Assistance 5  (no AD, requires pathfinding instruction)   Interdisciplinary Plan of Care Collaboration   IDT Collaboration with  Speech Therapist   Patient Position at End of Therapy Seated;Other (Comments)   Collaboration Comments tx of care to SLP       PT assisted pt with jai LSO at EOB.    Assessment    Pt ambulated with gait speed of 0.65 m/s during 6MWT without mobility aid. She was fully participative during session. Cognitive impairments likely limit pt's independence with functional mobility at this time.    Strengths: Motivated for self care and independence, Willingly participates in therapeutic activities, Adequate strength  Barriers: Impaired balance, Impaired activity tolerance, Decreased endurance, Generalized weakness, Fatigue, Limited mobility    Plan    Balance, gait, safety awareness, cardiovascular endurance, stair negotiation    DME  PT DME Recommendations  Wheelchair:  (no AD anticipated)      Physical Therapy Problems (Active)       Problem: Balance       Dates: Start:  12/20/23         Goal: STG-Within one week, patient will maintain dynamic standing       Dates: Start:  12/20/23    Expected End:  01/12/24       Description: In parallel bars on varying surfaces 1min x 4 with SPV      Goal Note filed on 12/20/23 1444 by Angelica Cardoso, MSPT        parallel bars on varying surfaces to challenge balance 1min x 4 with SPV                 Problem: Mobility       Dates: Start:  12/20/23         Goal: STG-Within one week, patient will ambulate household distance no AD SPV        Dates: Start:  12/20/23    Expected End:  01/12/24            Goal: STG-Within one week, patient will ambulate up/down a curb with no AD CGA       Dates: Start:  12/20/23    Expected End:  01/12/24               " Problem: PT-Long Term Goals       Dates: Start:  12/20/23         Goal: LTG-By discharge, patient will ambulate 50ft x 4 with no AD mod I       Dates: Start:  12/20/23    Expected End:  01/12/24            Goal: LTG-By discharge, patient will transfer one surface to another mod I SPT safely and consistently       Dates: Start:  12/20/23    Expected End:  01/12/24            Goal: LTG-By discharge, patient will perform home exercise program seated/standing there-ex for LE strengthening to be done 3x/day in safe, independent home environment       Dates: Start:  12/20/23    Expected End:  01/12/24            Goal: LTG-By discharge, patient will up/down threshold step for home entrance with no AD SPV       Dates: Start:  12/20/23    Expected End:  01/12/24

## 2023-12-24 NOTE — THERAPY
Speech Language Pathology  Daily Treatment     Patient Name: Rin Yung  Age:  73 y.o., Sex:  female  Medical Record #: [id number]  Today's Date: 12/24/2023     Precautions  Precautions: Fall Risk, Lumbosacral Orthosis, Spinal / Back Precautions   Comments: LSO OOB, baseline cognitive deficits    Subjective    Pt pleasant and agreeable to SLP svcs. Continues to report intermittent confusion throughout session.     Objective       12/24/23 0931   Treatment Charges   SLP Cognitive Skill Development First 15 Minutes 1   SLP Cognitive Skill Development Additional 15 Minutes 3   SLP Total Time Spent   SLP Individual Total Time Spent (Mins) 60   Precautions   Precautions Fall Risk;Lumbosacral Orthosis;Spinal / Back Precautions    Comments LSO OOB, baseline cognitive deficits   Cognition   Moderate Attention Moderate (3)   Medication Management  Severe (2)   Interdisciplinary Plan of Care Collaboration   IDT Collaboration with  Physical Therapist   Patient Position at End of Therapy In Bed;Bed Alarm On;Call Light within Reach;Tray Table within Reach;Phone within Reach   Collaboration Comments received pt from PT       Assessment    Patient read everyday items and answered comprehension questions with 66% accuracy; required mod verbal and gestural cues to answer with 100% accuracy. Patient expressed feeling this task was too easy despite low level of accuracy; reinforced deficits and SLP goals for cognition, carryover.    Completed first two pages of med organization error ID; patient continues to report low level of challenge despite requiring mod-max cues to comprehend prescriptions, ID errors. Created medication list this session; patient required min-mod cues to recall reasons for medications.    Strengths: Alert and oriented, Motivated for self care and independence, Pleasant and cooperative, Supportive family, Willingly participates in therapeutic activities  Barriers: Impaired carryover of learning, Impaired  functional cognition    Plan    Please complete fx medication sorting activity before d/c planning.     Passport items to be completed:  Express basic needs, understand food/liquid recommendations, consistently follow swallow precautions, manage finances, manage medications, arrive to therapy appointments on time, complete daily memory log entries, solve problems related to safety situations, review education related to hospitalization, complete caregiver training     Speech Therapy Problems (Active)       Problem: Memory STGs       Dates: Start:  12/20/23         Goal: STG-Within one week, patient will recall new information related to her current hospitalization with [facility].         Dates: Start:  12/20/23    Expected End:  01/12/24         Goal Note filed on 12/21/23 0805 by Pilo Dominguez MS,CCC-SLP       Pt evaluated yesterday, continue to target                  Problem: Problem Solving STGs       Dates: Start:  12/20/23         Goal: STG-Within one week, patient will complete medication management tasks given min A to achieve 80% accuracy.        Dates: Start:  12/20/23    Expected End:  01/12/24         Goal Note filed on 12/21/23 0805 by Pilo Dominguez MS,CCC-SLP       Pt evaluated yesterday, continue to target               Goal: STG-Within one week, patient will complete administration of the SCCAN.         Dates: Start:  12/20/23    Expected End:  01/12/24         Goal Note filed on 12/21/23 0805 by Pilo Dominguez MS,CCC-SLP       Pt evaluated yesterday, continue to target                  Problem: Speech/Swallowing LTGs       Dates: Start:  12/20/23         Goal: LTG-By discharge, patient will solve complex problems with spv.        Dates: Start:  12/20/23    Expected End:  01/12/24

## 2023-12-25 PROCEDURE — A9270 NON-COVERED ITEM OR SERVICE: HCPCS | Performed by: PHYSICAL MEDICINE & REHABILITATION

## 2023-12-25 PROCEDURE — 700102 HCHG RX REV CODE 250 W/ 637 OVERRIDE(OP): Performed by: PHYSICAL MEDICINE & REHABILITATION

## 2023-12-25 PROCEDURE — 770010 HCHG ROOM/CARE - REHAB SEMI PRIVAT*

## 2023-12-25 PROCEDURE — 700111 HCHG RX REV CODE 636 W/ 250 OVERRIDE (IP): Mod: JZ | Performed by: PHYSICAL MEDICINE & REHABILITATION

## 2023-12-25 RX ADMIN — LISINOPRIL 10 MG: 5 TABLET ORAL at 05:37

## 2023-12-25 RX ADMIN — Medication 3 MG: at 18:11

## 2023-12-25 RX ADMIN — OMEPRAZOLE 20 MG: 20 CAPSULE, DELAYED RELEASE ORAL at 08:37

## 2023-12-25 RX ADMIN — SENNOSIDES AND DOCUSATE SODIUM 2 TABLET: 50; 8.6 TABLET ORAL at 20:53

## 2023-12-25 RX ADMIN — CALCIUM CARBONATE (ANTACID) CHEW TAB 500 MG 500 MG: 500 CHEW TAB at 20:53

## 2023-12-25 RX ADMIN — AMLODIPINE BESYLATE 2.5 MG: 5 TABLET ORAL at 05:37

## 2023-12-25 RX ADMIN — CALCIUM CARBONATE (ANTACID) CHEW TAB 500 MG 500 MG: 500 CHEW TAB at 08:36

## 2023-12-25 RX ADMIN — METOPROLOL TARTRATE 25 MG: 25 TABLET, FILM COATED ORAL at 18:11

## 2023-12-25 RX ADMIN — ENOXAPARIN SODIUM 40 MG: 100 INJECTION SUBCUTANEOUS at 18:11

## 2023-12-25 RX ADMIN — METOPROLOL TARTRATE 25 MG: 25 TABLET, FILM COATED ORAL at 05:37

## 2023-12-25 RX ADMIN — SENNOSIDES AND DOCUSATE SODIUM 2 TABLET: 50; 8.6 TABLET ORAL at 08:36

## 2023-12-25 NOTE — PROGRESS NOTES
NURSING DAILY NOTE    Name: Rin Yung   Date of Admission: 12/19/2023   Admitting Diagnosis: Acute metabolic encephalopathy  Attending Physician: Randy Mcclain M.d.  Allergies: Patient has no allergy information on record.    Safety  Patient Assist  Min assist  Patient Precautions  Fall Risk, Lumbosacral Orthosis, Spinal / Back Precautions   Precaution Comments  LSO OOB, baseline cognitive deficits  Bed Transfer Status  Supervised  Toilet Transfer Status   Standby Assist (with no AD)  Assistive Devices  Rails, Wheelchair  Oxygen  None - Room Air  Diet/Therapeutic Dining  Current Diet Order   Procedures    Diet Order Diet: Regular     Pill Administration  whole  Agitated Behavioral Scale     ABS Level of Severity       Fall Risk  Has the patient had a fall this admission?   No  Marlene Stratton Fall Risk Scoring  11, MODERATE RISK  Fall Risk Safety Measures  bed alarm and chair alarm    Vitals  Temperature: 36.4 °C (97.6 °F)  Temp src: Oral  Pulse: 76  Respiration: 18  Blood Pressure : 133/89  Blood Pressure MAP (Calculated): 104 MM HG  BP Location: Left, Upper Arm  Patient BP Position: Sitting     Oxygen  Pulse Oximetry: 95 %  O2 (LPM): 0  O2 Delivery Device: None - Room Air    Bowel and Bladder  Last Bowel Movement  12/24/23  Stool Type  Type 5: Soft blob with clear cut edges (passed easily)  Bowel Device     Continent  Bladder: Continent void   Bowel: Continent movement  Bladder Function  Urine Void (mL):  (large)  Number of Times Voided: 1  Urine Color: Unable To Evaluate  Genitourinary Assessment   Bladder Assessment (WDL):  WDL Except  Urine Color: Unable To Evaluate  Time Void: Yes  Bladder Scan: Post Void  $ Bladder Scan Results (mL): 22    Skin  Michael Score   18  Sensory Interventions   Bed Types: Standard/Trauma Mattress  Skin Preventative Measures: Pillows in Use for Support / Positioning  Moisture Interventions  Moisturizers/Barriers: Barrier  Reviewed results of Xray. Patient has a fracture of sacrum/coccyx. Results were discussed with the patient. Pain is tolerable and pt is able to ambulate. Advised to continue tylenol and ibuprofen as needed for pain. Will also refer to spine surgery. Wipes      Pain  Pain Rating Scale  0 - No Pain  Pain Location     Pain Location Orientation     Pain Interventions   Declines    ADLs    Bathing   Shower  Linen Change   Partial  Personal Hygiene  Perineal Care, Moist Lara Wipes  Chlorhexidine Bath      Oral Care  Brushed Teeth (self)  Teeth/Dentures     Shave     Nutrition Percentage Eaten  *  * Meal *  *, Lunch, Between 25-50% Consumed (25%)  Environmental Precautions  Treaded Slipper Socks on Patient, Bed in Low Position  Patient Turns/Positioning  Patient Turns Self from Side to Side  Patient Turns Assistance/Tolerance  General Weakness  Bed Positions  Bed Controls On, Bed Locked  Head of Bed Elevated  Self regulated      Psychosocial/Neurologic Assessment  Psychosocial Assessment  Psychosocial (WDL):  WDL Except  Patient Behaviors: Confused  Neurologic Assessment  Neuro (WDL): Exceptions to WDL  Level of Consciousness: Alert  Orientation Level: Disoriented to time, Disoriented to situation  Cognition: Follows commands  Speech: Clear  Pupil Assesment: Yes  R Pupil Size (mm): 2  R Pupil Shape / Description: Round  R Pupil Reaction: Brisk  L Pupil Size (mm): 2  L Pupil Shape / Description: Round  L Pupil Reaction: Brisk  EENT (WDL):  WDL Except    Cardio/Pulmonary Assessment  Edema      Respiratory Breath Sounds  RUL Breath Sounds: Clear  RML Breath Sounds: Clear  RLL Breath Sounds: Diminished  ADOLFO Breath Sounds: Clear  LLL Breath Sounds: Diminished  Cardiac Assessment   Cardiac (WDL):  WDL Except

## 2023-12-25 NOTE — CARE PLAN
"  Problem: Fall Risk - Rehab  Goal: Patient will remain free from falls  Note: Marlene Stratton Fall risk Assessment Score: 11      Moderate fall risk Interventions  - Bed and strip alarm   - Yellow sign by the door   - Yellow wrist band \"Fall risk\"  - Room near to the nurse station  - Do not leave patient unattended in the bathroom  - Fall risk education provided          The patient is Stable - Low risk of patient condition declining or worsening    Shift Goals  Clinical Goals: Safety  Patient Goals: safety      "

## 2023-12-25 NOTE — PROGRESS NOTES
NURSING DAILY NOTE    Name: Rin Yung   Date of Admission: 12/19/2023   Admitting Diagnosis: Acute metabolic encephalopathy  Attending Physician: Randy Mcclain M.d.  Allergies: Patient has no allergy information on record.    Safety  Patient Assist  Min assist  Patient Precautions  Fall Risk, Lumbosacral Orthosis, Spinal / Back Precautions   Precaution Comments  LSO OOB, baseline cognitive deficits  Bed Transfer Status  Supervised  Toilet Transfer Status   Standby Assist (with no AD)  Assistive Devices  Rails, Wheelchair  Oxygen  None - Room Air  Diet/Therapeutic Dining  Current Diet Order   Procedures    Diet Order Diet: Regular     Pill Administration  whole  Agitated Behavioral Scale     ABS Level of Severity       Fall Risk  Has the patient had a fall this admission?   No  Marlene Stratton Fall Risk Scoring  11, MODERATE RISK  Fall Risk Safety Measures  bed alarm and chair alarm    Vitals  Temperature: 36.4 °C (97.6 °F)  Temp src: Oral  Pulse: 76  Respiration: 18  Blood Pressure : 133/89  Blood Pressure MAP (Calculated): 104 MM HG  BP Location: Left, Upper Arm  Patient BP Position: Sitting     Oxygen  Pulse Oximetry: 95 %  O2 (LPM): 0  O2 Delivery Device: None - Room Air    Bowel and Bladder  Last Bowel Movement  12/25/23 (per pt)  Stool Type  Type 5: Soft blob with clear cut edges (passed easily)  Bowel Device     Continent  Bladder: Continent void   Bowel: Continent movement  Bladder Function  Urine Void (mL):  (large)  Number of Times Voided: 1  Urine Color: Yellow  Genitourinary Assessment   Bladder Assessment (WDL):  WDL Except  Urine Color: Yellow  Time Void: Yes  Bladder Scan: Post Void  $ Bladder Scan Results (mL): 22    Skin  Michael Score   18  Sensory Interventions   Bed Types: Standard/Trauma Mattress  Skin Preventative Measures: Pillows in Use for Support / Positioning  Moisture Interventions  Moisturizers/Barriers: Barrier  Wipes      Pain  Pain Rating Scale  0 - No Pain  Pain Location     Pain Location Orientation     Pain Interventions   Declines    ADLs    Bathing   Shower  Linen Change   Partial  Personal Hygiene  Perineal Care, Moist Lara Wipes  Chlorhexidine Bath      Oral Care  Brushed Teeth (self)  Teeth/Dentures     Shave     Nutrition Percentage Eaten  *  * Meal *  *, Lunch, Between 25-50% Consumed (25%)  Environmental Precautions  Treaded Slipper Socks on Patient, Bed in Low Position  Patient Turns/Positioning  Patient Turns Self from Side to Side  Patient Turns Assistance/Tolerance  General Weakness  Bed Positions  Bed Controls On, Bed Locked  Head of Bed Elevated  Self regulated      Psychosocial/Neurologic Assessment  Psychosocial Assessment  Psychosocial (WDL):  WDL Except  Patient Behaviors: Confused  Neurologic Assessment  Neuro (WDL): Exceptions to WDL  Level of Consciousness: Alert  Orientation Level: Disoriented to time, Disoriented to situation  Cognition: Follows commands  Speech: Clear  Pupil Assesment: Yes  R Pupil Size (mm): 2  R Pupil Shape / Description: Round  R Pupil Reaction: Brisk  L Pupil Size (mm): 2  L Pupil Shape / Description: Round  L Pupil Reaction: Brisk  EENT (WDL):  WDL Except    Cardio/Pulmonary Assessment  Edema      Respiratory Breath Sounds  RUL Breath Sounds: Clear  RML Breath Sounds: Clear  RLL Breath Sounds: Diminished  ADOLFO Breath Sounds: Clear  LLL Breath Sounds: Diminished  Cardiac Assessment   Cardiac (WDL):  WDL Except

## 2023-12-26 ENCOUNTER — APPOINTMENT (OUTPATIENT)
Dept: PHYSICAL THERAPY | Facility: REHABILITATION | Age: 73
DRG: 052 | End: 2023-12-26
Attending: PHYSICAL MEDICINE & REHABILITATION
Payer: MEDICARE

## 2023-12-26 ENCOUNTER — APPOINTMENT (OUTPATIENT)
Dept: OCCUPATIONAL THERAPY | Facility: REHABILITATION | Age: 73
DRG: 052 | End: 2023-12-26
Attending: PHYSICAL MEDICINE & REHABILITATION
Payer: MEDICARE

## 2023-12-26 PROCEDURE — 97112 NEUROMUSCULAR REEDUCATION: CPT

## 2023-12-26 PROCEDURE — A9270 NON-COVERED ITEM OR SERVICE: HCPCS | Performed by: PHYSICAL MEDICINE & REHABILITATION

## 2023-12-26 PROCEDURE — 97535 SELF CARE MNGMENT TRAINING: CPT

## 2023-12-26 PROCEDURE — 97530 THERAPEUTIC ACTIVITIES: CPT

## 2023-12-26 PROCEDURE — 97116 GAIT TRAINING THERAPY: CPT

## 2023-12-26 PROCEDURE — 700102 HCHG RX REV CODE 250 W/ 637 OVERRIDE(OP): Performed by: PHYSICAL MEDICINE & REHABILITATION

## 2023-12-26 PROCEDURE — 700111 HCHG RX REV CODE 636 W/ 250 OVERRIDE (IP): Mod: JZ | Performed by: PHYSICAL MEDICINE & REHABILITATION

## 2023-12-26 PROCEDURE — 770010 HCHG ROOM/CARE - REHAB SEMI PRIVAT*

## 2023-12-26 PROCEDURE — 97110 THERAPEUTIC EXERCISES: CPT

## 2023-12-26 PROCEDURE — 99233 SBSQ HOSP IP/OBS HIGH 50: CPT | Performed by: PHYSICAL MEDICINE & REHABILITATION

## 2023-12-26 RX ORDER — LISINOPRIL 10 MG/1
10 TABLET ORAL
Qty: 30 TABLET | Refills: 3 | Status: SHIPPED | OUTPATIENT
Start: 2023-12-26

## 2023-12-26 RX ORDER — AMLODIPINE BESYLATE 2.5 MG/1
2.5 TABLET ORAL DAILY
Qty: 30 TABLET | Refills: 3 | Status: SHIPPED | OUTPATIENT
Start: 2023-12-26

## 2023-12-26 RX ADMIN — ENOXAPARIN SODIUM 40 MG: 100 INJECTION SUBCUTANEOUS at 17:18

## 2023-12-26 RX ADMIN — METOPROLOL TARTRATE 25 MG: 25 TABLET, FILM COATED ORAL at 17:18

## 2023-12-26 RX ADMIN — AMLODIPINE BESYLATE 2.5 MG: 5 TABLET ORAL at 05:33

## 2023-12-26 RX ADMIN — OMEPRAZOLE 20 MG: 20 CAPSULE, DELAYED RELEASE ORAL at 08:20

## 2023-12-26 RX ADMIN — METOPROLOL TARTRATE 25 MG: 25 TABLET, FILM COATED ORAL at 05:33

## 2023-12-26 RX ADMIN — LISINOPRIL 10 MG: 5 TABLET ORAL at 05:33

## 2023-12-26 RX ADMIN — Medication 3 MG: at 19:52

## 2023-12-26 RX ADMIN — CALCIUM CARBONATE (ANTACID) CHEW TAB 500 MG 500 MG: 500 CHEW TAB at 19:51

## 2023-12-26 RX ADMIN — CALCIUM CARBONATE (ANTACID) CHEW TAB 500 MG 500 MG: 500 CHEW TAB at 08:21

## 2023-12-26 RX ADMIN — SENNOSIDES AND DOCUSATE SODIUM 2 TABLET: 50; 8.6 TABLET ORAL at 08:20

## 2023-12-26 ASSESSMENT — BALANCE ASSESSMENTS
TURN 360 DEGREES: 2
PLACE ALTERNATE FOOT ON STEP OR STOOL WHILE STANDING UNSUPPORTED: 2
PICK UP OBJECT FROM THE FLOOR FROM A STANDING POSITION: 0
MEDICARE IMPAIRMENT PERCENTAGE: 30
SITTING UNSUPPORTED: 4
STANDING TO SITTING: 4
STANDING UNSUPPORTED WITH FEET TOGETHER: 4
STANDING UNSUPPORTED ONE FOOT IN FRONT: 2
LOOK OVER LEFT AND RIGHT SHOULDERS WHILE STANDING: 0
REACHING FORWARD WITH OUTSTRETCHED ARM WHILE STANDING: 4
STANDING UNSUPPORTED WITH EYES CLOSED: 4
STANDING ON ONE LEG: 1
TRANSFERS: 4
LONG VERSION TOTAL SCORE (MAX 56): 39
STANDING UNSUPPORTED: 4
LONG VERSION TOTAL SCORE (MAX 56): 39
SITTING TO STANDING: 4

## 2023-12-26 ASSESSMENT — GAIT ASSESSMENTS
GAIT LEVEL OF ASSIST: SUPERVISED
DISTANCE (FEET): 750
DISTANCE (FEET): 500
GAIT LEVEL OF ASSIST: STANDBY ASSIST
DEVIATION: INCREASED BASE OF SUPPORT;BRADYKINETIC;DECREASED HEEL STRIKE;DECREASED TOE OFF

## 2023-12-26 ASSESSMENT — ACTIVITIES OF DAILY LIVING (ADL)
BED_CHAIR_WHEELCHAIR_TRANSFER_DESCRIPTION: ADAPTIVE EQUIPMENT;SUPERVISION FOR SAFETY
SHOWER_TRANSFER_LEVEL_OF_ASSIST: REQUIRES SUPERVISION WITH SHOWER TRANSFER
TOILET_TRANSFER_LEVEL_OF_ASSIST: REQUIRES SUPERVISION WITH TOILET TRANSFER
TOILETING_LEVEL_OF_ASSIST: ABLE TO COMPLETE TOILETING WITHOUT ASSIST
BED_CHAIR_WHEELCHAIR_TRANSFER_DESCRIPTION: SUPERVISION FOR SAFETY

## 2023-12-26 ASSESSMENT — BRIEF INTERVIEW FOR MENTAL STATUS (BIMS)
WHAT YEAR IS IT: CORRECT
ASKED TO RECALL BED: YES, AFTER CUEING (A PIECE OF FURNITURE")"
ASKED TO RECALL BLUE: YES, NO CUE REQUIRED
BIMS SUMMARY SCORE: 14
WHAT MONTH IS IT: ACCURATE WITHIN 5 DAYS
ASKED TO RECALL SOCK: YES, NO CUE REQUIRED
INITIAL REPETITION OF BED BLUE SOCK - FIRST ATTEMPT: 3
WHAT DAY OF THE WEEK IS IT: CORRECT

## 2023-12-26 NOTE — THERAPY
Occupational Therapy  Daily Treatment     Patient Name: Rin Yung  Age:  73 y.o., Sex:  female  Medical Record #: [id number]  Today's Date: 12/26/2023     Precautions  Precautions: Fall Risk, Lumbosacral Orthosis, Spinal / Back Precautions   Comments: LSO OOB, baseline cognitive deficits         Subjective    Pt seated in bed upon arrival, pleasant and cooperative, agreeable to therapy and shower       Objective       12/26/23 1201   OT Charge Group   OT Self Care / ADL (Units) 4   OT Therapy Activity (Units) 2   OT Total Time Spent   OT Individual Total Time Spent (Mins) 90   Functional Level of Assist   Grooming Supervision;Standing   Bathing Supervision  (standing shower)   Upper Body Dressing Stand by Assist  (Min cues for LSO donning)   Lower Body Dressing Standby Assist  (set-up assist for clothing orientation)   Toileting Supervision   Toilet Transfers Supervised  (with FWW)   Tub / Shower Transfers Standby Assist  (without AD)   Interdisciplinary Plan of Care Collaboration   Patient Position at End of Therapy In Bed;Bed Alarm On;Call Light within Reach;Tray Table within Reach   Eating   Assistance Needed Independent   Physical Assistance Level No physical assistance   CARE Score - Eating 6   Oral Hygiene   Assistance Needed Independent   Physical Assistance Level No physical assistance   CARE Score - Oral Hygiene 6   Toileting Hygiene   Assistance Needed Set-up / clean-up   Physical Assistance Level No physical assistance   CARE Score - Toileting Hygiene 5   Shower/Bathe Self   Assistance Needed Set-up / clean-up   Physical Assistance Level No physical assistance   CARE Score - Shower/Bathe Self 5   Upper Body Dressing   Assistance Needed Set-up / clean-up   Physical Assistance Level No physical assistance   CARE Score - Upper Body Dressing 5   Lower Body Dressing   Assistance Needed Set-up / clean-up   Physical Assistance Level No physical assistance   CARE Score - Lower Body Dressing 5   Putting  On/Taking Off Footwear   Assistance Needed Independent   Physical Assistance Level No physical assistance   CARE Score - Putting On/Taking Off Footwear 6   Toilet Transfer   Assistance Needed Supervision   Physical Assistance Level No physical assistance   CARE Score - Toilet Transfer 4   Confusion Assessment Method (CAM)   Is there evidence of an acute change in mental status from the patient's baseline? No   Inattention Behavior present, fluctuates (comes and goes, changes in severity)   Disorganized thinking Behavior present, fluctuates (comes and goes, changes in severity)   Altered level of consciousness Behavior not present   Discharge Summary    Discharge Location  Home   Patient Discharging with Assist of Family    Level of Supervision Required 24 Hour Supervision   Recommended Equipment for Discharge Grab Bars in Tub / Shower;Grab Bars by Toilet;Hand Held Shower Head;Shower Chair   Recommended Services Upon Discharge Home Health Occupational Therapy   Long Term Goals Met 0   Long Term Goals Not Met 2   Reason(s) for Goals Not Met pt did not meet goals for ADL's and transfers at Mod I. Pt requires supervision d/t impaired functional cognition and standing balance   Criteria for Termination of Services Maximum Function Achieved for Inpatient Rehabilitation   Discharge Instructions to Patient   Level of Assist Required for Eating Able to Complete Eating without Assist   Level of Assist Required for Grooming Able to Complete Grooming without Assist   Level of Assist Required for Dressing Requires Supervision with Dressing   Level of Assist Required for Toileting Able to Complete Toileting without Assist   Level of Assist Required for Toilet Transfer Requires Supervision with Toilet Transfer   Equipment for Toilet Transfer Grab Bars by Toilet   Level of Assist Required for Bathing Able to Complete Bathing without Assist   Level of Assist Required for Shower Transfer Requires Supervision with Shower Transfer    Equipment for Shower Transfer Grab Bars in Tub / Shower;Shower Chair   Level of Assist Required for Home Mgmt Requires Physical Assist with Home Management   Level of Assist Required for Meal Prep Requires Physical Assist with Meal Preparation   Driving May not Drive, Please Contact Physician for Further Information   Home Exercise Program Refer to Home Exercise Program Handout for Details     Functional mobility without AD: SBA throughout indoors, 500ft+    Assessment    Pt cont to demo improve mobility - does not require an AD, completed standing shower with no safety issues or LOB. Pt with no further questions/concerns re: d/c tomorrow and home safety     Strengths: Able to follow instructions, Independent prior level of function, Motivated for self care and independence, Pleasant and cooperative, Supportive family  Barriers: Decreased endurance, Fatigue, Generalized weakness, Impaired activity tolerance, Limited mobility    Plan    D/c tomorrow     Occupational Therapy Goals (Active)       Problem: OT Long Term Goals       Dates: Start:  12/20/23         Goal: LTG-By discharge, patient will complete basic self care tasks with Mod Independent level.       Dates: Start:  12/20/23    Expected End:  01/12/24         Goal Note filed on 12/26/23 1258 by Carri Marroquin MS,OTR/L       Requires set-up assist - supervision              Goal: LTG-By discharge, patient will perform bathroom transfers with Mod Independent level.       Dates: Start:  12/20/23    Expected End:  01/12/24         Goal Note filed on 12/26/23 1258 by Carri Marroquin MS,OTR/L       Requires supervision

## 2023-12-26 NOTE — PROGRESS NOTES
NURSING DAILY NOTE    Name: Rin Yung   Date of Admission: 12/19/2023   Admitting Diagnosis: Acute metabolic encephalopathy  Attending Physician: Randy Mcclain M.d.  Allergies: Patient has no allergy information on record.    Safety  Patient Assist  SB A  Patient Precautions  Fall Risk, Lumbosacral Orthosis, Spinal / Back Precautions   Precaution Comments  LSO OOB, baseline cognitive deficits  Bed Transfer Status  Supervised  Toilet Transfer Status   Standby Assist (with no AD)  Assistive Devices  Walker - front wheel  Oxygen  None - Room Air  Diet/Therapeutic Dining  Current Diet Order   Procedures    Diet Order Diet: Regular     Pill Administration  whole  Agitated Behavioral Scale     ABS Level of Severity       Fall Risk  Has the patient had a fall this admission?   No  Marlene Stratton Fall Risk Scoring  11, MODERATE RISK  Fall Risk Safety Measures  bed alarm and chair alarm    Vitals  Temperature: 36.4 °C (97.5 °F)  Temp src: Oral  Pulse: 75  Respiration: 16  Blood Pressure : (!) 144/97  Blood Pressure MAP (Calculated): 113 MM HG  BP Location: Upper Arm  Patient BP Position: Sitting     Oxygen  Pulse Oximetry: 96 %  O2 (LPM): 0  O2 Delivery Device: None - Room Air    Bowel and Bladder  Last Bowel Movement  12/25/23  Stool Type  Type 6: Fluffy pieces with ragged edges, a mushy stool  Bowel Device     Continent  Bladder: Continent void   Bowel: Continent movement  Bladder Function  Urine Void (mL):  (restroom)  Number of Times Voided: 1  Urine Color: Unable To Evaluate  Genitourinary Assessment   Bladder Assessment (WDL):  WDL Except  Urine Color: Unable To Evaluate  Time Void: Yes  Bladder Scan: Post Void  $ Bladder Scan Results (mL): 22    Skin  Michael Score   18  Sensory Interventions   Bed Types: Standard/Trauma Mattress  Skin Preventative Measures: Pillows in Use for Support / Positioning  Moisture Interventions  Moisturizers/Barriers: Barrier  Wipes      Pain  Pain Rating Scale  0 - No Pain  Pain Location     Pain Location Orientation     Pain Interventions   Declines    ADLs    Bathing   Shower  Linen Change   Partial  Personal Hygiene  Perineal Care, Moist Lara Wipes  Chlorhexidine Bath      Oral Care  Brushed Teeth (self)  Teeth/Dentures     Shave     Nutrition Percentage Eaten  Dinner, Between % Consumed  Environmental Precautions  Treaded Slipper Socks on Patient, Bed in Low Position  Patient Turns/Positioning  Patient Turns Self from Side to Side  Patient Turns Assistance/Tolerance  General Weakness  Bed Positions  Bed Controls On, Bed Locked  Head of Bed Elevated  Self regulated      Psychosocial/Neurologic Assessment  Psychosocial Assessment  Psychosocial (WDL):  WDL Except  Patient Behaviors: Confused  Neurologic Assessment  Neuro (WDL): Exceptions to WDL  Level of Consciousness: Alert  Orientation Level: Disoriented to time, Disoriented to situation  Cognition: Follows commands  Speech: Clear  Pupil Assesment: Yes  R Pupil Size (mm): 2  R Pupil Shape / Description: Round  R Pupil Reaction: Brisk  L Pupil Size (mm): 2  L Pupil Shape / Description: Round  L Pupil Reaction: Brisk  EENT (WDL):  WDL Except    Cardio/Pulmonary Assessment  Edema      Respiratory Breath Sounds  RUL Breath Sounds: Clear  RML Breath Sounds: Clear  RLL Breath Sounds: Diminished  ADOLFO Breath Sounds: Clear  LLL Breath Sounds: Diminished  Cardiac Assessment   Cardiac (WDL):  WDL Except

## 2023-12-26 NOTE — CARE PLAN
Problem: Bathing  Goal: STG-Within one week, patient will bathe with SBA.  Outcome: Met     Problem: Dressing  Goal: STG-Within one week, patient will dress UB with Min A.  Outcome: Met  Goal: STG-Within one week, patient will dress LB with Min A.  Outcome: Met     Problem: Functional Transfers  Goal: STG-Within one week, patient will transfer to toilet with SBA.  Outcome: Met  Goal: STG-Within one week, patient will transfer to step in shower with SBA.  Outcome: Met     Problem: IADL's  Goal: STG-Within one week, patient will prepare a meal with CGA.  Outcome: Met     Problem: OT Long Term Goals  Goal: LTG-By discharge, patient will complete basic self care tasks with Mod Independent level.  Outcome: Not Met  Note: Requires set-up assist - supervision  Goal: LTG-By discharge, patient will perform bathroom transfers with Mod Independent level.  Outcome: Not Met  Note: Requires supervision

## 2023-12-26 NOTE — CARE PLAN
"The patient is Watcher - Medium risk of patient condition declining or worsening    Shift Goals  Clinical Goals: Safety  Patient Goals: Participate in therapy    Progress made toward(s) clinical / shift goals:    Problem: Skin Integrity  Goal: Skin integrity is maintained or improved  Outcome: Progressing     Problem: Fall Risk - Rehab  Goal: Patient will remain free from falls  12/26/2023 0237 by Daily Bowen R.N.  Outcome: Progressing  Note: Marlene Stratton Fall risk Assessment Score: 11    Moderate fall risk Interventions  - Bed and strip alarm   - Yellow sign by the door   - Yellow wrist band \"Fall risk\"  - Room near to the nurse station  - Do not leave patient unattended in the bathroom  - Fall risk education provided             " no

## 2023-12-26 NOTE — THERAPY
Physical Therapy   Daily Treatment     Patient Name: Rin Yung  Age:  73 y.o., Sex:  female  Medical Record #: [id number]  Today's Date: 12/26/2023     Precautions  Precautions: (P) Fall Risk, Lumbosacral Orthosis, Spinal / Back Precautions   Comments: (P) LSO OOB, baseline cognitive deficits    Subjective    Pt received on her bed and agreeable to participate in therapy.     Objective       12/26/23 1031   PT Charge Group   Charges Yes   PT Gait Training (Units) 1   PT Therapeutic Exercise (Units) 1   PT Therapeutic Activities (Units) 1   PT Total Time Spent   PT Individual Total Time Spent (Mins) 45   Precautions   Precautions Fall Risk;Lumbosacral Orthosis;Spinal / Back Precautions    Comments LSO OOB, baseline cognitive deficits   Gait Functional Level of Assist    Gait Level Of Assist Supervised   Assistive Device None   Distance (Feet) 750   Deviation Increased Base Of Support;Bradykinetic;Decreased Heel Strike;Decreased Toe Off   Stairs Functional Level of Assist   Level of Assist with Stairs Supervised   # of Stairs Climbed 1   Stairs Description Extra time;Supervision for safety;Verbal cueing   Transfer Functional Level of Assist   Bed, Chair, Wheelchair Transfer Supervised   Bed Chair Wheelchair Transfer Description Adaptive equipment;Supervision for safety   Standing Lower Body Exercises   Hamstring Curl 1 set of 10;Bilateral    Hip Abduction 1 set of 10;Bilateral   Marching 1 set of 10   Heel Rise 1 set of 10;Bilateral  (limited rom)   Toe Rise 1 set of 10;Bilateral   Mini Squat 1 set of 10;Partial   Bed Mobility    Supine to Sit Supervised   Sit to Supine Supervised   Sit to Stand Supervised   Scooting Supervised   Interdisciplinary Plan of Care Collaboration   Patient Position at End of Therapy In Bed;Bed Alarm On;Call Light within Reach;Tray Table within Reach;Phone within Reach         Assessment    Performed car transfer, supervision with good technique and steady balance. Gait training without  "AD x 750' SBA. Nu step x 10 mins, level 8. PT assisted in donning her LSO after toilet use. Performed 1 step (4\") negotiation without rails or hand support. SBA. She demonstrated steady dynamic balance.   Strengths: Motivated for self care and independence, Willingly participates in therapeutic activities, Adequate strength  Barriers: Impaired balance, Impaired activity tolerance, Decreased endurance, Generalized weakness, Fatigue, Limited mobility    Plan    Balance, gait, safety awareness, cardiovascular endurance, stair negotiation     DME  PT DME Recommendations  Wheelchair:  (no AD anticipated)    Passport items to be completed:  Get in/out of bed safely, in/out of a vehicle, safely use mobility device, walk or wheel around home/community, navigate up and down stairs, show how to get up/down from the ground, ensure home is accessible, demonstrate HEP, complete caregiver training    Physical Therapy Problems (Active)       Problem: Balance       Dates: Start:  12/20/23         Goal: STG-Within one week, patient will maintain dynamic standing       Dates: Start:  12/20/23    Expected End:  01/12/24       Description: In parallel bars on varying surfaces 1min x 4 with SPV      Goal Note filed on 12/20/23 1444 by Angelica Cardoso MSPT        parallel bars on varying surfaces to challenge balance 1min x 4 with SPV                 Problem: Mobility       Dates: Start:  12/20/23         Goal: STG-Within one week, patient will ambulate household distance no AD SPV        Dates: Start:  12/20/23    Expected End:  01/12/24            Goal: STG-Within one week, patient will ambulate up/down a curb with no AD CGA       Dates: Start:  12/20/23    Expected End:  01/12/24               Problem: PT-Long Term Goals       Dates: Start:  12/20/23         Goal: LTG-By discharge, patient will ambulate 50ft x 4 with no AD mod I       Dates: Start:  12/20/23    Expected End:  01/12/24            Goal: LTG-By discharge, " patient will transfer one surface to another mod I SPT safely and consistently       Dates: Start:  12/20/23    Expected End:  01/12/24            Goal: LTG-By discharge, patient will perform home exercise program seated/standing there-ex for LE strengthening to be done 3x/day in safe, independent home environment       Dates: Start:  12/20/23    Expected End:  01/12/24            Goal: LTG-By discharge, patient will up/down threshold step for home entrance with no AD SPV       Dates: Start:  12/20/23    Expected End:  01/12/24

## 2023-12-26 NOTE — CARE PLAN
Problem: Self Care  Goal: Patient will have the ability to perform ADLs independently or with assistance  Outcome: Progressing  Note: Patient able to perform regular activities this shift.  Pain controlled this shift.  Pain management includes PRN pain meds as well as non-pharmacological measures such as emotional support, rest, and repositioning.  Will continue to monitor.   The patient is Stable - Low risk of patient condition declining or worsening    Shift Goals  Clinical Goals: Safety  Patient Goals: Participate in therapy    Progress made toward(s) clinical / shift goals:  pt participates with therapy and is cooperative with nursing    Patient is not progressing towards the following goals:

## 2023-12-26 NOTE — PROGRESS NOTES
Physical Medicine & Rehabilitation Progress Note    Encounter Date: 12/26/2023    Chief Complaint: Low back pain    Interval Events (Subjective):  GC Code to Support Admission: 0004.111 - Spinal Cord Dysfunction, Non-Traumatic: Paraplegia, Incomplete  Etiologic diagnosis: Non-traumatic SCI incomplete paraplegia     Patient is doing well. She is ready to DC home tomorrow. She has no concerns. Case discussed with staff who also report she is ready for DC. She has used zero morphine eq for days. Pain controlled.       ROS:  Gen: No fatigue, confusion, significant weight loss  Eyes: no blurry vision, double vision or pain in eyes  ENT: no changes in hearing, runny nose, nose bleeds, sinus pain  CV: No CP, palpitations  Lungs: no shortness of breath, changes in secretions, changes in cough, pain with coughing  Abd: No abd pain, nausea, vomiting, pain with eating  : no blood in urine, pain during storage phase, bladder spasms, suprapubic pain  Ext: No swelling in legs, asymmetric atrophy  Neuro: no changes in strength or sensation  Skin: no new ulcers/skin breakdown appreciated by patient  Mood: No changes in mood today, increase in depression or anxiety  Heme: no bruising, or bleeding    Objective:  Vitals: /85   Pulse (!) 50   Temp 36.4 °C (97.5 °F) (Oral)   Resp 17   Wt 58 kg (127 lb 14.4 oz)   SpO2 97%   Gen: NAD, Body mass index is 21.95 kg/m².  HEENT:  NC/AT, PERRLA, moist mucous membranes  Cardio: RRR, no mumurs  Pulm: CTAB, with normal respiratory effort  Abd: Soft NTND, active bowel sounds,   Ext: No peripheral edema. No calf tenderness. No clubbing/cyanosis. +dorsal pedalis pulses bilaterally.  Incision is clean dry and tacked no increased warmth    Laboratory Values:  No results found for this or any previous visit (from the past 72 hour(s)).      Medications:  Scheduled Medications   Medication Dose Frequency    melatonin  3 mg AFTER DINNER    Pharmacy Consult Request  1 Each PHARMACY TO DOSE     senna-docusate  2 Tablet BID    omeprazole  20 mg DAILY    amLODIPine  2.5 mg DAILY    calcium carbonate  500 mg BID    lisinopril  10 mg DAILY    metoprolol tartrate  25 mg BID    enoxaparin (LOVENOX) injection  40 mg DAILY AT 1800     PRN medications: Respiratory Therapy Consult, hydrALAZINE, senna-docusate **AND** polyethylene glycol/lytes **AND** magnesium hydroxide **AND** bisacodyl, ondansetron **OR** ondansetron, sodium chloride, methocarbamol, oxyCODONE immediate-release, acetaminophen    Diet:  Current Diet Order   Procedures    Diet Order Diet: Regular       Assessment:  Active Hospital Problems    Diagnosis     *Acute metabolic encephalopathy        Medical Decision Making and Plan:    Modified from Dr. Mcclain's to progress on 12/21/2023  Paraplegia s/p L3-4 TLIF ext of fusion   -Continue comprehensive acute inpatient rehabilitation     Acute encephalopathy 2/2 surgery and medication  - decrease oxycodone and muscle relaxers  -- SLP to evaluate and treat cognitive deficits.   -out of posey bed as if 12/21     HTN  Continue amlodipine and losartan  -may need to increase bp meds, monitor     Neurogenic bladder:  - Timed voids with PVR q4H x3. If PVR > 400mL or if patient is unable to void, straight cath patient.     Neurogenic bowel:  -  Colace, Senna BID on admission  - Goal of 1BM/day.    Circadian Rhythm disorder:   -On melatonin prn  -12/20- scheduled after dinner  Recommend lights on during the day/off at night, minimize nighttime interruptions as able.     Mood  - at risk of adjustment disorder, depression, and anxiety due to functional decline     ID:  - at risk for Urinary tract infection     Skin/Wounds:  - Pressure relief q2h while in bed. Close monitoring for signs of breakdown     Pain:  - Neuroceptic - On Tylenol prn, oxycodone 2.5mg PRN, Robaxin 250mg PRN  -No NSAIDS per surgeon     DVT prophylaxis:  continue lovenox     GI prophylaxis:  On Prilosec 20mg daily      -Follow-up  Ortho  ____________________________________  Fabricio Traore DO MS  [facility] - Physical Medicine and Rehabilitation     ____________________________________    Patient was seen for >50 minutes on unit/floor of which > 50% of time was spent on counseling and coordination of care regarding the above, including prognosis, risk reduction, benefits of treatment, and options for next stage of care.

## 2023-12-26 NOTE — DISCHARGE PLANNING
CM spoke with son this am RE: DC questions.  CM spoke with patient this afternoon to let her know this CM has been in contact with her son Alin.  CM will continue to monitor for DC needs and is available as needs arise.     Self

## 2023-12-26 NOTE — THERAPY
"Physical Therapy   Daily Treatment     Patient Name: Rin Yung  Age:  73 y.o., Sex:  female  Medical Record #: [id number]  Today's Date: 12/26/2023     Precautions  Precautions: Fall Risk, Lumbosacral Orthosis, Spinal / Back Precautions   Comments: LSO OOB, baseline cognitive deficits    Subjective    Patient is found in bed, is agreeable to participate. Denies pain, reports \"surgery was 100% successful, my legs felt hollow before, and now they feel normal\"     Objective       12/26/23 1301   PT Charge Group   PT Gait Training (Units) 1   PT Therapeutic Exercise (Units) 1   PT Neuromuscular Re-Education / Balance (Units) 2   PT Total Time Spent   PT Individual Total Time Spent (Mins) 60   Gait Functional Level of Assist    Gait Level Of Assist Standby Assist   Assistive Device None   Distance (Feet) 500   # of Times Distance was Traveled 2   Deviation Increased Base Of Support;Bradykinetic;Decreased Heel Strike;Decreased Toe Off;Trendelenberg  (RLE drops as she fatigues)   Stairs Functional Level of Assist   Level of Assist with Stairs Supervised   # of Stairs Climbed 10   Stairs Description Extra time;Hand rails;Limited by fatigue;Supervision for safety   Transfer Functional Level of Assist   Bed, Chair, Wheelchair Transfer Supervised   Bed Chair Wheelchair Transfer Description Supervision for safety   Sitting Lower Body Exercises   Sit to Stand 2 sets of 10   Nustep Resistance Level 8  (10 min 495 steps)   Standing Lower Body Exercises   Hamstring Curl 2 sets of 10;Bilateral    Hip Abduction 2 sets of 10;Bilateral   Marching 3 sets of 10   Heel Rise 2 sets of 10;Bilateral   Mini Squat 2 sets of 10;Full    Outcome Measures   Outcome Measures Utilized 5x STS;Vasquez Balance Scale;FGA   5x STS (Five Times Sit to Stand Test)   Height of Sitting Surface (inches) 17   5x Sit to STand (seconds) 13.9   Score Definition Fall Risk  (norm 12.6 sec)   Vasquez Balance Scale   Sitting Unsupported (Score 0-4) 4   Change Of " Positon: Sitting To Standing (Score 0-4) 4   Change Of Positon: Standing To Sitting (Score 0-4) 4   Transfers (Score 0-4) 4   Standing Unsupported (Score 0-4) 4   Standing With Eyes Closed (Score 0-4) 4   Standing With Feet Together (Score 0-4) 4   Tandem Standing (Score 0-4) 2   Standing On One Leg (Score 0-4) 1   Turning Trunk (Feet Fixed) (Score 0-4) 0  (post op surgical precautions)   Retrieving Objects From Floor (Score 0-4) 0  (post op surgical precautions, unable to perform deep squat)   Turning 360 Degrees (Score 0-4) 2   Stool Stepping (Score 0-4) 2   Reaching Forward While Standing (Score 0-4) 4   Vasquez Balance Total Score (0-56) 39   FGA (Functional Gait Assessment)   Gait Level Surface 2   Change in Gait Speed 2   Gait with Horizontal Head Turns 1   Gait with Vertical Head Turns 1   Gait and Pivot Turns 1   Step Over Obstacle 1   Gait with Narrow Base of Support 1   Gait with Eyes Closed 2   Ambulating Backwards 1   Steps 1   Functional Gait Score 13   Interdisciplinary Plan of Care Collaboration   Patient Position at End of Therapy In Bed;Bed Alarm On;Call Light within Reach;Tray Table within Reach;Phone within Reach         Assessment    Patient is a high fall risk, FGA is limited by slow gait speed, wide gait path with tasks during ambulation. Vasquez is limited by spinal precautions and narrow base of support situations. She is ambulating longer distances without assistive device, demonstrates RLE trendelenburg hip drop with LLE stance phase as she fatigues.     Strengths: Motivated for self care and independence, Willingly participates in therapeutic activities, Adequate strength  Barriers: Impaired balance, Impaired activity tolerance, Decreased endurance, Generalized weakness, Fatigue, Limited mobility    Plan    Balance, gait, safety awareness, cardiovascular endurance, stair negotiation      DME  PT DME Recommendations  Wheelchair:  (no AD anticipated)    Passport items to be completed:  Get in/out  of bed safely, in/out of a vehicle, safely use mobility device, walk or wheel around home/community, navigate up and down stairs, show how to get up/down from the ground, ensure home is accessible, demonstrate HEP, complete caregiver training    Physical Therapy Problems (Active)       Problem: Balance       Dates: Start:  12/20/23         Goal: STG-Within one week, patient will maintain dynamic standing       Dates: Start:  12/20/23    Expected End:  01/12/24       Description: In parallel bars on varying surfaces 1min x 4 with SPV      Goal Note filed on 12/20/23 1444 by Angelica Cardoso MSPT        parallel bars on varying surfaces to challenge balance 1min x 4 with SPV                 Problem: Mobility       Dates: Start:  12/20/23         Goal: STG-Within one week, patient will ambulate household distance no AD SPV        Dates: Start:  12/20/23    Expected End:  01/12/24            Goal: STG-Within one week, patient will ambulate up/down a curb with no AD CGA       Dates: Start:  12/20/23    Expected End:  01/12/24               Problem: PT-Long Term Goals       Dates: Start:  12/20/23         Goal: LTG-By discharge, patient will ambulate 50ft x 4 with no AD mod I       Dates: Start:  12/20/23    Expected End:  01/12/24            Goal: LTG-By discharge, patient will transfer one surface to another mod I SPT safely and consistently       Dates: Start:  12/20/23    Expected End:  01/12/24            Goal: LTG-By discharge, patient will perform home exercise program seated/standing there-ex for LE strengthening to be done 3x/day in safe, independent home environment       Dates: Start:  12/20/23    Expected End:  01/12/24            Goal: LTG-By discharge, patient will up/down threshold step for home entrance with no AD SPV       Dates: Start:  12/20/23    Expected End:  01/12/24

## 2023-12-27 VITALS
HEART RATE: 64 BPM | WEIGHT: 127.9 LBS | DIASTOLIC BLOOD PRESSURE: 88 MMHG | BODY MASS INDEX: 21.95 KG/M2 | RESPIRATION RATE: 18 BRPM | OXYGEN SATURATION: 95 % | SYSTOLIC BLOOD PRESSURE: 122 MMHG | TEMPERATURE: 97.8 F

## 2023-12-27 PROCEDURE — 700102 HCHG RX REV CODE 250 W/ 637 OVERRIDE(OP): Performed by: PHYSICAL MEDICINE & REHABILITATION

## 2023-12-27 PROCEDURE — 99239 HOSP IP/OBS DSCHRG MGMT >30: CPT | Performed by: PHYSICAL MEDICINE & REHABILITATION

## 2023-12-27 PROCEDURE — A9270 NON-COVERED ITEM OR SERVICE: HCPCS | Performed by: PHYSICAL MEDICINE & REHABILITATION

## 2023-12-27 RX ADMIN — OMEPRAZOLE 20 MG: 20 CAPSULE, DELAYED RELEASE ORAL at 08:13

## 2023-12-27 RX ADMIN — CALCIUM CARBONATE (ANTACID) CHEW TAB 500 MG 500 MG: 500 CHEW TAB at 08:13

## 2023-12-27 RX ADMIN — LISINOPRIL 10 MG: 5 TABLET ORAL at 05:29

## 2023-12-27 RX ADMIN — AMLODIPINE BESYLATE 2.5 MG: 5 TABLET ORAL at 05:29

## 2023-12-27 RX ADMIN — METOPROLOL TARTRATE 25 MG: 25 TABLET, FILM COATED ORAL at 05:29

## 2023-12-27 NOTE — PROGRESS NOTES
NURSING DAILY NOTE    Name: Rin Yung   Date of Admission: 12/19/2023   Admitting Diagnosis: Acute metabolic encephalopathy  Attending Physician: Randy Mcclain M.d.  Allergies: Patient has no allergy information on record.    Safety  Patient Assist  min  Patient Precautions  Fall Risk, Lumbosacral Orthosis, Spinal / Back Precautions   Precaution Comments  LSO OOB, baseline cognitive deficits  Bed Transfer Status  Supervised  Toilet Transfer Status   Supervised (with FWW)  Assistive Devices  Rails, Wheelchair  Oxygen  None - Room Air  Diet/Therapeutic Dining  Current Diet Order   Procedures    Diet Order Diet: Regular     Pill Administration  whole  Agitated Behavioral Scale     ABS Level of Severity       Fall Risk  Has the patient had a fall this admission?   No  Marlene Stratton Fall Risk Scoring  11, MODERATE RISK  Fall Risk Safety Measures  bed alarm    Vitals  Temperature: 36.7 °C (98.1 °F)  Temp src: Oral  Pulse: 66  Respiration: 17  Blood Pressure : 133/87  Blood Pressure MAP (Calculated): 102 MM HG  BP Location: Left, Upper Arm  Patient BP Position: Supine     Oxygen  Pulse Oximetry: 96 %  O2 (LPM): 0  O2 Delivery Device: None - Room Air    Bowel and Bladder  Last Bowel Movement  12/25/23  Stool Type  Type 6: Fluffy pieces with ragged edges, a mushy stool  Bowel Device  Bathroom  Continent  Bladder: Continent void   Bowel: Continent movement  Bladder Function  Urine Void (mL):  (restroom)  Number of Times Voided: 1  Urine Color: Unable To Evaluate  Genitourinary Assessment   Bladder Assessment (WDL):  WDL Except  Urine Color: Unable To Evaluate  Bladder Device: Bathroom  Time Void: Yes  Bladder Scan: Post Void  $ Bladder Scan Results (mL): 22    Skin  Michael Score   18  Sensory Interventions   Bed Types: Standard/Trauma Mattress  Skin Preventative Measures: Pillows in Use for Support / Positioning  Moisture Interventions  Moisturizers/Barriers:  Barrier Wipes      Pain  Pain Rating Scale  0 - No Pain  Pain Location     Pain Location Orientation     Pain Interventions   Declines    ADLs    Bathing   Shower  Linen Change   Partial  Personal Hygiene  Perineal Care, Moist Lara Wipes  Chlorhexidine Bath      Oral Care  Brushed Teeth (self)  Teeth/Dentures     Shave     Nutrition Percentage Eaten  Lunch, Less than 25% Consumed  Environmental Precautions  Treaded Slipper Socks on Patient, Personal Belongings, Wastebasket, Call Bell etc. in Easy Reach, Transferred to Stronger Side, Report Given to Other Health Care Providers Regarding Fall Risk, Bed in Low Position  Patient Turns/Positioning  Patient Turns Self from Side to Side  Patient Turns Assistance/Tolerance  General Weakness  Bed Positions  Bed Controls On  Head of Bed Elevated  Self regulated      Psychosocial/Neurologic Assessment  Psychosocial Assessment  Psychosocial (WDL):  WDL Except  Patient Behaviors: Forgetful  Neurologic Assessment  Neuro (WDL): Exceptions to WDL  Level of Consciousness: Alert  Orientation Level: Disoriented to time, Disoriented to situation  Cognition: Follows commands  Speech: Clear  Pupil Assesment: No  R Pupil Size (mm): 2  R Pupil Shape / Description: Round  R Pupil Reaction: Brisk  L Pupil Size (mm): 2  L Pupil Shape / Description: Round  L Pupil Reaction: Brisk  Motor Function/Sensation Assessment: Motor strength  Muscle Strength Right Arm: Good Strength Against Gravity and Moderate Resistance  Muscle Strength Left Arm: Good Strength Against Gravity and Moderate Resistance  Muscle Strength Right Leg: Good Strength Against Gravity and Moderate Resistance  Muscle Strength Left Leg: Good Strength Against Gravity and Moderate Resistance  EENT (WDL):  WDL Except    Cardio/Pulmonary Assessment  Edema      Respiratory Breath Sounds  RUL Breath Sounds: Clear  RML Breath Sounds: Clear  RLL Breath Sounds: Diminished  ADOLFO Breath Sounds: Clear  LLL Breath Sounds: Diminished  Cardiac  Assessment   Cardiac (WDL):  WDL Except (hx htn)

## 2023-12-27 NOTE — DISCHARGE PLANNING
Case management  Reviewed signed copy of IMM and answered all questions.    Dc date /disposition:12/27/23 with home health.

## 2023-12-27 NOTE — PROGRESS NOTES
Patient discharged to home per order.  Discharge instructions reviewed with patient and son; they verbalize understanding and signed copies placed in chart.  Patient has all belongings; signed copy of form in chart.  Patient left facility at 1110 via wheelchair accompanied by rehab staff and son.  Have enjoyed working with this pleasant patient.

## 2023-12-27 NOTE — CARE PLAN
Problem: Knowledge Deficit - Standard  Goal: Patient and family/care givers will demonstrate understanding of plan of care, disease process/condition, diagnostic tests and medications  Outcome: Progressing     Problem: Skin Integrity  Goal: Skin integrity is maintained or improved  Outcome: Progressing     Problem: Discharge Barriers/Planning  Goal: Patient's continuum of care needs are met  Outcome: Progressing   The patient is Watcher - Medium risk of patient condition declining or worsening    Shift Goals  Clinical Goals: Safety  Patient Goals: Participate in therapy

## 2023-12-27 NOTE — PROGRESS NOTES
NURSING DAILY NOTE    Name: Rin Yung   Date of Admission: 12/19/2023   Admitting Diagnosis: Acute metabolic encephalopathy  Attending Physician: Randy Mcclain M.d.  Allergies: Patient has no allergy information on record.    Safety  Patient Assist  standby assist  Patient Precautions  Fall Risk, Lumbosacral Orthosis, Spinal / Back Precautions   Precaution Comments  LSO OOB, baseline cognitive deficits  Bed Transfer Status  Supervised  Toilet Transfer Status   Supervised (with FWW)  Assistive Devices  Rails, Walker - front wheel  Oxygen  None - Room Air  Diet/Therapeutic Dining  Current Diet Order   Procedures    Diet Order Diet: Regular     Pill Administration  whole  Agitated Behavioral Scale     ABS Level of Severity       Fall Risk  Has the patient had a fall this admission?   No  Marlene Stratton Fall Risk Scoring  11, MODERATE RISK  Fall Risk Safety Measures  bed alarm    Vitals  Temperature: 36.7 °C (98.1 °F)  Temp src: Oral  Pulse: 66  Respiration: 17  Blood Pressure : 133/87  Blood Pressure MAP (Calculated): 102 MM HG  BP Location: Left, Upper Arm  Patient BP Position: Supine     Oxygen  Pulse Oximetry: 96 %  O2 (LPM): 0  O2 Delivery Device: None - Room Air    Bowel and Bladder  Last Bowel Movement  12/26/23 (refused bowel meds tonight, pt states she had a really good one today.)  Stool Type  Type 6: Fluffy pieces with ragged edges, a mushy stool  Bowel Device  Bathroom  Continent  Bladder: Continent void   Bowel: Continent movement  Bladder Function  Urine Void (mL):  (restroom)  Number of Times Voided: 1  Urine Color: Yellow  Genitourinary Assessment   Bladder Assessment (WDL):  WDL Except  Urine Color: Yellow  Bladder Device: Bathroom  Time Void: Yes  Bladder Scan: Post Void  $ Bladder Scan Results (mL): 22    Skin  Michael Score   18  Sensory Interventions   Bed Types: Standard/Trauma Mattress  Skin Preventative Measures: Pillows in Use to  "Float Heels  Moisture Interventions  Moisturizers/Barriers: Barrier Wipes      Pain  Pain Rating Scale  0 - No Pain  Pain Location     Pain Location Orientation     Pain Interventions   Declines    ADLs    Bathing   Patient Refused Bathing (Per patient, she said: \"I had my shower earlier today\". Nurse noted.)  Linen Change   Partial  Personal Hygiene  Moist Lara Wipes  Chlorhexidine Bath      Oral Care  Brushed Teeth (self)  Teeth/Dentures     Shave     Nutrition Percentage Eaten  Dinner, Between % Consumed  Environmental Precautions  Treaded Slipper Socks on Patient, Personal Belongings, Wastebasket, Call Bell etc. in Easy Reach, Transferred to Stronger Side, Report Given to Other Health Care Providers Regarding Fall Risk, Bed in Low Position  Patient Turns/Positioning  Patient Turns Self from Side to Side  Patient Turns Assistance/Tolerance  General Weakness  Bed Positions  Bed Controls On  Head of Bed Elevated  Self regulated      Psychosocial/Neurologic Assessment  Psychosocial Assessment  Psychosocial (WDL):  WDL Except  Patient Behaviors: Forgetful  Neurologic Assessment  Neuro (WDL): Exceptions to WDL  Level of Consciousness: Alert  Orientation Level: Disoriented to time, Disoriented to situation  Cognition: Follows commands  Speech: Clear  Pupil Assesment: No  R Pupil Size (mm): 2  R Pupil Shape / Description: Round  R Pupil Reaction: Brisk  L Pupil Size (mm): 2  L Pupil Shape / Description: Round  L Pupil Reaction: Brisk  Motor Function/Sensation Assessment: Motor strength  Muscle Strength Right Arm: Good Strength Against Gravity and Moderate Resistance  Muscle Strength Left Arm: Good Strength Against Gravity and Moderate Resistance  Muscle Strength Right Leg: Good Strength Against Gravity and Moderate Resistance  Muscle Strength Left Leg: Good Strength Against Gravity and Moderate Resistance  EENT (WDL):  WDL Except    Cardio/Pulmonary Assessment  Edema      Respiratory Breath Sounds  RUL Breath " Sounds: Clear  RML Breath Sounds: Clear  RLL Breath Sounds: Diminished  ADOLFO Breath Sounds: Clear  LLL Breath Sounds: Diminished  Cardiac Assessment   Cardiac (WDL):  WDL Except (hx htn)

## 2023-12-27 NOTE — CARE PLAN
Problem: Knowledge Deficit - Standard  Goal: Patient and family/care givers will demonstrate understanding of plan of care, disease process/condition, diagnostic tests and medications  Outcome: Met     Problem: Skin Integrity  Goal: Skin integrity is maintained or improved  Outcome: Met     Problem: Fall Risk - Rehab  Goal: Patient will remain free from falls  Outcome: Met     Problem: Discharge Barriers/Planning  Goal: Patient's continuum of care needs are met  Outcome: Met     Problem: Psychosocial  Goal: Patient's level of anxiety will decrease  Outcome: Met  Goal: Patient's ability to verbalize feelings about condition will improve  Outcome: Met  Goal: Patient's ability to re-evaluate and adapt role responsibilities will improve  Outcome: Met  Goal: Patient and family will demonstrate ability to cope with life altering diagnosis and/or procedure  Outcome: Met  Goal: Spiritual and cultural needs incorporated into hospitalization  Outcome: Met     Problem: Hemodynamics  Goal: Patient's hemodynamics, fluid balance and neurologic status will be stable or improve  Outcome: Met     Problem: Risk for Aspiration  Goal: Patient's risk for aspiration will be absent or decrease  Outcome: Met     Problem: Bladder / Voiding  Goal: Patient will establish and maintain regular urinary output  Outcome: Met  Goal: Patient will establish and maintain bladder regimen  Outcome: Met     Problem: Bowel Elimination  Goal: Patient will participate in bowel management program  Outcome: Met     Problem: Skin Integrity  Goal: Patient's skin integrity will be maintained or improve  Outcome: Met     Problem: Nutrition  Goal: Patient's nutritional and fluid intake will be adequate or improve  Outcome: Met  Goal: Patient will display progressive weight gain toward goal have adequate food and fluid intake  Outcome: Met  Goal: Patient will demonstrate ability to administer respiratory medications  Outcome: Met     Problem: Self Care  Goal:  Patient will have the ability to perform ADLs independently or with assistance  Outcome: Met     Problem: Mobility  Goal: Patient's capacity to carry out activities will improve  Outcome: Met     Problem: Infection  Goal: Patient will remain free from infection  Outcome: Met     Problem: VTE Prevention  Goal: Patient will remain free from venous thromboembolism (VTE)  Outcome: Met   The patient is Stable - Low risk of patient condition declining or worsening    Shift Goals  Clinical Goals: Safety  Patient Goals: Participate in therapy    Progress made toward(s) clinical / shift goals:  pt goals met, discharged home    Patient is not progressing towards the following goals:

## 2023-12-27 NOTE — PROGRESS NOTES
This patient, and family with patient permission, received individualized medication counseling regarding the current regimen they are receiving in this facility. Potential adverse effects, monitoring parameters, and proper administration were covered in preparation for their discharge. This patient is being treated for hypertension and it is recommended that they monitor and record with a blood pressure device. The patient has been instructed to contact their primary care physician if the HR or blood pressure is abnormal. The patient asked relevant questions regarding the medications for which answers were provided. Our pharmacy hours and phone number were provided for follow up questions should they arise.  Nas Fulton  [facility]

## 2023-12-27 NOTE — DISCHARGE PLANNING
CM met with patient and her son Alin to go over DC.  CM answered questions and is available as needs arise.

## 2023-12-27 NOTE — DISCHARGE SUMMARY
Rehab Discharge Summary    Admission Date: 12/19/2023    Discharge Date: 12/27/2023    Attending Provider: Dr Jani Azar    Admission Diagnosis:   Active Hospital Problems    Diagnosis     *Acute metabolic encephalopathy        Discharge Diagnosis:  Active Hospital Problems    Diagnosis     *Acute metabolic encephalopathy        HPI per H&P:  Patient is 73 y.o. female s/p elective L3-S1 lami with L3-L4 fusion and dural repair. Per son, she apparently has been struggling at home for the past year with her cognition, and believes she may have undiagnosed dementia. She has been sundowning and getting agitated in the afternoons/evenings while admitted. She was confused throughout the session, difficult to understand due to nonsensical speech, had difficulty sequencing tasks, and had a difficult time retaining new information. Strong posterior lean during seated/standing activities. Additional factors influencing patient status / progress: weakness, fatigue, impaired balance, impaired cognition.        Patient was admitted to [facility] on 12/19/2023.     Hospital Course by Problem List:  Modified from Dr. Mcclain's to progress on 12/21/2023  Paraplegia s/p L3-4 TLIF ext of fusion   -Continue comprehensive acute inpatient rehabilitation     Acute encephalopathy 2/2 surgery and medication  - decrease oxycodone and muscle relaxers  -- SLP to evaluate and treat cognitive deficits.   -out of posey bed as if 12/21     HTN  Continue amlodipine and losartan  -may need to increase bp meds, monitor     Neurogenic bladder:  - Timed voids with PVR q4H x3. If PVR > 400mL or if patient is unable to void, straight cath patient.     Neurogenic bowel:  -  Colace, Senna BID on admission  - Goal of 1BM/day.     Circadian Rhythm disorder:   -On melatonin prn  -12/20- scheduled after dinner  Recommend lights on during the day/off at night, minimize nighttime interruptions as able.     Mood  - at risk of adjustment  disorder, depression, and anxiety due to functional decline     ID:  - at risk for Urinary tract infection     Skin/Wounds:  - Pressure relief q2h while in bed. Close monitoring for signs of breakdown     Pain:  - Neuroceptic - On Tylenol prn, oxycodone 2.5mg PRN, Robaxin 250mg PRN  -No NSAIDS per surgeon     DVT prophylaxis:  continue lovenox     GI prophylaxis:  On Prilosec 20mg daily      -Follow-up Ortho    Functional Status at Discharge  Eating:  Independent  Eating Description:     Grooming:  Supervision, Standing  Grooming Description:  Seated in wheelchair at sink, Set-up of equipment, Increased time, Initial preparation for task, Supervision for safety  Bathing:  Supervision (standing shower)  Bathing Description:  Grab bar, Hand held shower, Tub bench, Assit wtih lower extremities, Increased time, Initial preparation for task, Set-up of equipment, Supervision for safety  Upper Body Dressing:  Stand by Assist (Min cues for LSO donning)  Upper Body Dressing Description:  Application of orthotic or brace, Increased time, Initial preparation for task, Set-up of equipment, Supervision for safety  Lower Body Dressing:  Standby Assist (set-up assist for clothing orientation)  Lower Body Dressing Description:  Assist with threading into pant leg  Discharge Location : Home  Patient Discharging with Assist of: Family   Level of Supervision Required: 24 Hour Supervision  Recommended Equipment for Discharge: Grab Bars in Tub / Shower;Grab Bars by Toilet;Hand Held Shower Head;Shower Chair  Recommended Services Upon Discharge: Home Health Occupational Therapy  Long Term Goals Met: 0  Long Term Goals Not Met: 2  Reason(s) for Goals Not Met: pt did not meet goals for ADL's and transfers at Mod I. Pt requires supervision d/t impaired functional cognition and standing balance  Criteria for Termination of Services: Maximum Function Achieved for Inpatient Rehabilitation  Walk:  Standby Assist  Distance Walked:  500  Number of  Times Distance Was Traveled:  2  Assistive Device:  None  Gait Deviation:  Increased Base Of Support, Bradykinetic, Decreased Heel Strike, Decreased Toe Off, Trendelenberg (RLE drops as she fatigues)  Wheelchair:  Stand by Assist  Distance Propelled:  100   Wheelchair Description:   (bilateral UE, verbal cues for steering)  Stairs Supervised  Stairs Description Extra time, Hand rails, Limited by fatigue, Supervision for safety     Comprehension:  Modified Independent  Comprehension Description:  Glasses  Expression:  Independent  Expression Description:     Social Interaction:  Independent  Social Interaction Description:     Problem Solving:  Moderate Assist  Problem Solving Description:  Verbal cueing, Therapy schedule, Seat belt, Increased time, Bed/chair alarm  Memory:  Moderate Assist  Memory Description:  Verbal cueing, Therapy schedule, Seat belt, Increased time, Bed/chair alarm       IFabricio D.O., personally performed a complete drug regimen review and no potential clinically significant medication issues were identified.   Discharge Medication:     Medication List        CHANGE how you take these medications        Instructions   * metoprolol tartrate 25 MG Tabs  What changed: when to take this  Commonly known as: Lopressor  Notes to patient: High blood pressure   Take 1 Tablet by mouth 2 times a day for 120 days.  Dose: 25 mg     * metoprolol tartrate 25 MG Tabs  What changed: You were already taking a medication with the same name, and this prescription was added. Make sure you understand how and when to take each.  Commonly known as: Lopressor  Notes to patient: DUPLICATE   Take 1 Tablet by mouth 2 times a day.  Dose: 25 mg           * This list has 2 medication(s) that are the same as other medications prescribed for you. Read the directions carefully, and ask your doctor or other care provider to review them with you.                CONTINUE taking these medications        Instructions    alendronate 70 MG Tabs  Commonly known as: Fosamax  Notes to patient: Osteoporosis   Take 70 mg by mouth every 7 days.  Dose: 70 mg     amLODIPine 2.5 MG Tabs  Commonly known as: Norvasc  Notes to patient: High blood pressure   Take 1 Tablet by mouth every day.  Dose: 2.5 mg     CHELATED MAGNESIUM PO  Notes to patient: Supplement   Take 1 Tablet by mouth every day.  Dose: 1 Tablet     escitalopram 10 MG Tabs  Commonly known as: Lexapro  Notes to patient: Depression   Take 10 mg by mouth every day.  Dose: 10 mg     FIBER PO  Notes to patient: Bowel health   Take 1 Capsule by mouth every day.  Dose: 1 Capsule     lisinopril 10 MG Tabs  Commonly known as: Prinivil  Notes to patient: High blood pressure   Take 1 Tablet by mouth every day.  Dose: 10 mg     POTASSIUM PO  Notes to patient: Supplement   Take 1 Tablet by mouth every day.  Dose: 1 Tablet     Probiotic Tbec  Notes to patient: Supplement   Take 1 Tablet by mouth every day.  Dose: 1 Tablet            STOP taking these medications      oxyCODONE immediate-release 5 MG Tabs  Commonly known as: Roxicodone     tizanidine 2 MG tablet  Commonly known as: Zanaflex              Discharge Diet:  Regular    Discharge Activity:  As tolerated    Disposition:  Patient to discharge home with family support and community resources.     Equipment:  Home health    Follow-up & Discharge Instructions:  Follow up with your primary care provider (PCP) within 7-10 days of discharge to review your medications and take over your care.     If you develop chest pain, fever, chills, change in neurologic function (weakness, sensation changes, vision changes), or other concerning sxs, seek immediate medical attention or call 911.      Condition on Discharge:  Good    More than 35 minutes was spent on discharging this patient, including face-to-face time, prescription management, and the dictation of this note.    Fabricio Traore D.O.    Date of Service: 12/27/2023

## 2023-12-27 NOTE — DISCHARGE INSTRUCTIONS
[facility] NURSING DISCHARGE INSTRUCTIONS    Blood Pressure : 122/88  Weight: 58 kg (127 lb 14.4 oz)  Nursing recommendations for Rin Yung at time of discharge are as follows:  Client and Family Member verbalized understanding of all discharge instructions and prescriptions.     Review all your home medications and newly ordered medications with your doctor and/or pharmacist. Follow medication instructions as directed by your doctor and/or pharmacist.    Pain Management:   Discharge Pain Medication Instructions:  Comfort Goal: Comfort with Movement, Perform Activity, Stay Alert  Notify your primary care provider if pain is unrelieved with these measures, if the pain is new, or increased in intensity.    Discharge Skin Characteristics:    Discharge Skin Exam:    Wound 12/14/23 Incision Back staples, mepilex (Active)   Site Assessment JORGE 12/26/23 1950   Periwound Assessment JORGE 12/26/23 1950   Margins JORGE;Attached edges 12/24/23 0830   Closure Jose 12/26/23 0551   Drainage Amount None 12/24/23 0830   Treatments Site care;Offloading 12/23/23 0848   Wound Cleansing Approved Wound Cleanser 12/26/23 0551   Dressing Status Clean;Dry;Intact 12/26/23 0551   Dressing Changed Changed 12/26/23 0551   Dressing Options [place] Dressing 12/26/23 0551   Dressing Change/Treatment Frequency As Needed 12/24/23 0830   NEXT Weekly Photo (Inpatient Only) 01/02/24 12/26/23 0551     Skin / Wound Care Instructions: Please contact your primary care physician for any change in skin integrity.     If You Have Surgical Incisions / Wounds:  Monitor surgical site(s) for signs of increased swelling, redness or symptoms of drainage from the site or fever as this could indicate signs and symptoms of infection. If these symptoms are noted, notifiy your primary care provider.      Discharge Safety Instructions:       Discharge Safety Concerns: Unsteady Gait  The interdisciplinary team has made recommendation that  you should have adult supervision in the house due to history of dementia  Anti-embolic stockings are not required to increase circulation to the lower extremities.    Discharge Diet: Regular     Discharge Liquids: Thin  Discharge Bowel Function: Continent  Please contact your primary care physician for any changes in bowel habits.  Discharge Bowel Program:    Discharge Bladder Function: Continent  Discharge Urinary Devices:        Nursing Discharge Plan:   Influenza Vaccine Indication: Not indicated: Previously immunized this influenza season and > 8 years of age (pt sightly confused ask again when confusion resolves)    Case Management Discharge Instructions:   Discharge Location: Home  Agency Name/Address/Phone:    Home Health:    Outpatient Services:    DME Provider/Phone:    Medical Equipment Ordered:    Prescription Faxed to:        Discharge Medication Instructions:  Below are the medications your physician expects you to take upon discharge:    Lumbar Laminectomy, Care After  Refer to this sheet in the next few weeks. These instructions provide you with information on caring for yourself after your procedure. Your health care provider may also give you more specific instructions. Your treatment has been planned according to current medical practices, but problems sometimes occur. Call your health care provider if you have any problems or questions after your procedure.  HOME CARE INSTRUCTIONS   Check the incision twice a day for signs of infection. Some signs may include a foul-smelling, greenish or yellowish discharge from the wound, increased pain, or increased redness over the incision.  Change your bandages about 24-36 hours after surgery, or as directed.  You may shower once the bandage is removed, or as directed. Avoid tub baths, swimming, and hot tubs for 3 weeks or until your incision has healed completely. If you have stitches or staples, they may be removed 2-3 weeks after surgery, or as directed  by your doctor.  Daily exercise is helpful to prevent the return of problems. Walking is permitted. You may use a treadmill without an incline. Cut down on activities and exercise if you have discomfort. You may also go up and down stairs as much as you can tolerate.  Do not lift anything heavier than 15 lb. Avoid bending or twisting at the waist. Always bend your knees.  Maintain strength and range of motion as instructed.  Do not drive for 2-3 weeks, or as directed by your doctor. You may be a passenger for 20-30 minutes at a time. Lying back in the passenger seat may be more comfortable for you.  Limit your sitting to intervals of 20-30 minutes. You should lie down or walk in between sitting periods. There are no limitations for sitting in a recliner chair.  Only take over-the-counter or prescription medicines for pain, discomfort, or fever as directed by your health care provider.  SEEK MEDICAL CARE IF:   There is increased bleeding (more than a small spot) from the wound.  You notice redness, swelling, or increasing pain in the wound.  Pus is coming from the wound.  You have a fever for more than 2-3 days.  You notice a foul smell coming from the wound or dressing.  You have increasing pain in your wound.  SEEK IMMEDIATE MEDICAL CARE IF:   You develop a rash.  You have difficulty breathing.  You have any allergic problems.  You develop a headache or stiff neck that does not respond to pain relievers.  You are unable to urinate.  You develop new onset of pain, numbness, or weakness in the buttocks or lower extremities.  MAKE SURE YOU:   Understand these instructions.  Will watch your condition.  Will get help right away if you are not doing well or get worse.     This information is not intended to replace advice given to you by your health care provider. Make sure you discuss any questions you have with your health care provider.     Document Released: 11/21/2005 Document Revised: 08/20/2014 Document Reviewed:  05/15/2014  [employer] Patient Education ©2016 [employer]    Spinal Fusion, Adult, Care After  This sheet gives you information about how to care for yourself after your procedure. Your doctor may also give you more instructions. If you have problems or questions, contact your doctor.  What can I expect after the procedure?  After the procedure, it is common to have:  Back pain and stiffness.  Pain in the area around your cut from surgery (incision).  Follow these instructions at home:  Medicines    Take over-the-counter and prescription medicines only as told by your doctor. These include any medicines for pain or medicines to thin your blood (anticoagulants).  If you were prescribed an antibiotic medicine, take it as told by your doctor. Do not stop it even if you start to feel better.  If told, take steps to prevent problems with pooping (constipation). You may need to:  Drink enough fluid to keep your pee (urine) pale yellow.  Take medicines. You will be told what medicines to take.  Eat foods that are high in fiber. These include beans, whole grains, and fresh fruits and vegetables.  Limit foods that are high in fat and sugar. These include fried or sweet foods.  Ask your doctor if you should avoid driving or using machines while you are taking your medicine.  Bleeding precautions  If you are taking blood thinners:  Talk with your doctor before taking any medicines that have aspirin or NSAIDs, such as ibuprofen.  Take medicines exactly as told. Take them at the same time each day.  Avoid doing things that could hurt or bruise you. Take action to prevent falls.  Wear an alert bracelet or carry a card that shows you are taking blood thinners.  If you have a brace:  Wear the brace as told by your doctor. Take it off only as told by your doctor.  Check the skin around the brace every day. Tell your doctor if you have any concerns.  Loosen the brace if your legs or toes:  Tingle.  Become numb.  Turn  cold and blue.  Keep the brace clean.  If the brace is not waterproof:  Do not let it get wet.  Cover it with a watertight covering when you take a bath or shower.  Managing pain, stiffness, and swelling  If told, put ice on the affected area. To do this:  If you have a removable brace, take it off as told by your doctor.  Put ice in a plastic bag.  Place a towel between your skin and the bag.  Leave the ice on for 20 minutes, 2-3 times a day.  Take off the ice if your skin turns bright red. This is very important. If you cannot feel pain, heat, or cold, you have a greater risk of damage to the area.    Incision care    Follow instructions from your doctor about how to take care of your incision. Make sure you:  Wash your hands with soap and water for at least 20 seconds before and after you change your bandage. If you cannot use soap and water, use hand .  Change your bandage.  Leave stitches or skin glue in place for at least 2 weeks.  Leave tape strips alone unless you are told to take them off. You may trim the edges of the tape strips if they curl up.  Keep your incision clean and dry.  Do not take baths, swim, or use a hot tub. Ask your doctor about taking showers or sponge baths.  Check your incision every day for signs of infection. Check for:  More redness, swelling, or pain.  Fluid or blood.  Warmth.  Pus or a bad smell.  Activity    Rest as told by your doctor.  Get up to take short walks every 1 to 2 hours. Ask for help if you feel weak or unsteady.  Follow instructions from your doctor about how to move. Use good posture to help your spine heal.  Do not lift anything at all, or anything that is heavier than the limit you are told, until your doctor says that it is safe.  Do not twist or bend at the waist until your doctor says it is okay.  Protect your back. To do this:  Do not make pushing and pulling motions.  Do not lift anything over your head.  Do not sit or lie down in the same position  for a long time.  Do exercises as told by your doctor.  General instructions  Do not drive until your doctor says it is okay.  Wear compression stockings as told by your doctor.  Do not take out your drain tube. Follow instructions from your doctor about how to take care of it.  Do not smoke or use any products that contain nicotine or tobacco. If you need help quitting, ask your doctor.  Keep all follow-up visits.  Contact a doctor if:  Your pain gets worse or does not get better with medicine.  Your legs become painful, swollen, red, or warm to the touch.  You have any of these signs of infection in your incision:  More redness, swelling, or pain.  Fluid or blood.  Warmth.  Pus or a bad smell.  You have a fever.  You vomit or you feel like you may vomit.  You have new or worse weakness or loss of feeling (numbness) in your legs.  You cannot control when you poop or pee.  Get help right away if:  Your pain is very bad.  You have a headache that is worse when you are sitting or standing.  You have chest pain.  You have trouble breathing.  These symptoms may be an emergency. Get help right away. Call your local emergency services (911 in the [place]  Do not wait to see if the symptoms will go away.  Do not drive yourself to the hospital.  Summary  After the procedure, it is common to have pain in your back and pain in your incision.  Putting ice on the area and taking pain medicines may help to control the pain. Follow directions from your doctor.  Rest and protect your back as much as possible.  Do not twist or bend at the waist.  Rest, but get up to take short walks every 1-2 hours.  This information is not intended to replace advice given to you by your health care provider. Make sure you discuss any questions you have with your health care provider.  Document Revised: 04/07/2021 Document Reviewed: 04/07/2021  [employer] Patient Education © 2023 [employer]      Occupational Therapy Discharge Instructions for Rin  Kimo Yung    12/26/2023    Level of Assist Required for Eating: Able to Complete Eating without Assist  Level of Assist Required for Grooming: Able to Complete Grooming without Assist  Level of Assist Required for Dressing: Requires Supervision with Dressing  Level of Assist Required for Toileting: Able to Complete Toileting without Assist  Level of Assist Required for Toilet Transfer: Requires Supervision with Toilet Transfer  Equipment for Toilet Transfer: Grab Bars by Toilet  Level of Assist Required for Bathing: Able to Complete Bathing without Assist  Level of Assist Required for Shower Transfer: Requires Supervision with Shower Transfer  Equipment for Shower Transfer: Grab Bars in Tub / Shower, Shower Chair  Level of Assist Required for Home Mgmt: Requires Physical Assist with Home Management  Level of Assist Required for Meal Prep: Requires Physical Assist with Meal Preparation  Driving: May not Drive, Please Contact Physician for Further Information  Home Exercise Program: Refer to Home Exercise Program Handout for Details

## (undated) DEVICE — SPONGE XRAY 8X4 STERL. 12PL - (10EA/TY 80TY/CA)

## (undated) DEVICE — SYRINGE NON SAFETY 3 CC 21 GA X 1 1/2 IN (100/BX 8BX/CA)

## (undated) DEVICE — LIGHT SOURCE MIS 12FT

## (undated) DEVICE — DRAPE CHEST/BREAST - (12EA/CA)

## (undated) DEVICE — ROBOTIC SURGERY SERVICES

## (undated) DEVICE — GLOVE BIOGEL SZ 8.5 SURGICAL PF LTX - (50PR/BX 4BX/CA)

## (undated) DEVICE — TRAY CATHETER FOLEY URINE METER W/STATLOCK 350ML (10EA/CA)

## (undated) DEVICE — LACTATED RINGERS INJ. 500 ML - (24EA/CA)

## (undated) DEVICE — SUTURE 2-0 VICRYL PLUS CT-1 - 8 X 18 INCH(12/BX)

## (undated) DEVICE — CELLSAVER PACK

## (undated) DEVICE — NEEDLE SPINAL NON-SAFETY 18 GA X 3 IN (25EA/BX)

## (undated) DEVICE — DRAPE STRLE REG TOWEL 18X24 - (10/BX 4BX/CA)"

## (undated) DEVICE — TOWEL STOP TIMEOUT SAFETY FLAG (40EA/CA)

## (undated) DEVICE — LACTATED RINGERS INJ 1000 ML - (14EA/CA 60CA/PF)

## (undated) DEVICE — SYRINGE NON SAFETY 5 CC 20 GA X 1-1/2 IN (100/BX 4BX/CA)

## (undated) DEVICE — GLOVE BIOGEL PI INDICATOR SZ 6.5 SURGICAL PF LF - (50/BX 4BX/CA)

## (undated) DEVICE — DRAPE LARGE 3 QUARTER - (20/CA)

## (undated) DEVICE — DRAPE LAPAROTOMY T SHEET - (12EA/CA)

## (undated) DEVICE — TOWELS CLOTH SURGICAL - (4/PK 20PK/CA)

## (undated) DEVICE — DRILL TOOL MR8-31TD3030 MR8 TWST 3MMX30MM (1/EA)

## (undated) DEVICE — SLEEVE, VASO, THIGH, MED

## (undated) DEVICE — STERI STRIP COMPOUND BENZOIN - TINCTURE 0.6ML WITH APPLICATOR (40EA/BX)

## (undated) DEVICE — SPHERE NAVIGATION STEALTH (5EA/TY 12TY/PK)

## (undated) DEVICE — GLOVE PROTEXIS W/NEU-THERA SZ 8.5 (50PR/BX)

## (undated) DEVICE — ELECTRODE DUAL RETURN W/ CORD - (50/PK)

## (undated) DEVICE — DRESSING POST OP BORDER 4 X 10 (5EA/BX)

## (undated) DEVICE — GLOVE BIOGEL SZ 9 SURGICAL PF LTX - (50/BX 4BX/CA)

## (undated) DEVICE — SOD. CHL. INJ. 0.9% 1000 ML - (14EA/CA 60CA/PF)

## (undated) DEVICE — TOOL MR8 14CM MATCH HD SYM-TRI 3MM DIAMETER (1/EA)

## (undated) DEVICE — BONE MILL BM210

## (undated) DEVICE — SODIUM CHL IRRIGATION 0.9% 1000ML (12EA/CA)

## (undated) DEVICE — TUBING CLEARLINK DUO-VENT - C-FLO (48EA/CA)

## (undated) DEVICE — PACK JACKSON TABLE KIT W/OUT - HR (6EA/CA)

## (undated) DEVICE — GLOVE SZ 6.5 BIOGEL PI MICRO - PF LF (50PR/BX)

## (undated) DEVICE — SUTURE GENERAL

## (undated) DEVICE — ARMREST CRADLE FOAM - (2PR/PK 12PR/CA)

## (undated) DEVICE — DRAPE 36X28IN RAD CARM BND BG - (25/CA) O

## (undated) DEVICE — CELLSAVER STAT

## (undated) DEVICE — COVER LIGHT HANDLE ALC PLUS DISP (18EA/BX)

## (undated) DEVICE — CANISTER SUCTION 3000ML MECHANICAL FILTER AUTO SHUTOFF MEDI-VAC NONSTERILE LF DISP  (40EA/CA)

## (undated) DEVICE — SET EPIDURAL BURRON NON-SAFETY (12EA/CA)

## (undated) DEVICE — SUTURE 1 VICRYL PLUS CTX - 8 X 18 INCH (12/BX)

## (undated) DEVICE — SUCTION INSTRUMENT YANKAUER BULBOUS TIP W/O VENT (50EA/CA)

## (undated) DEVICE — Device

## (undated) DEVICE — SUTURE 3-0 VICRYL PLUS RB-1 - 8 X 18 INCH (12/BX)

## (undated) DEVICE — HEADREST PRONEVIEW LARGE - (10/CA)

## (undated) DEVICE — TUBING C&T SET FLYING LEADS DRAIN TUBING (10EA/BX)

## (undated) DEVICE — SYRINGE NON SAFETY 10 CC 20 GA X 1-1/2 IN (100/BX 4BX/CA)

## (undated) DEVICE — COVER MAYO STAND X-LG - (22EA/CA)

## (undated) DEVICE — INTRAOP NEURO IN OR 1:1 PER 15 MIN

## (undated) DEVICE — KIT EVACUATER 3 SPRING PVC LF 1/8 DRAIN SIZE (10EA/CA)"

## (undated) DEVICE — GOWN WARMING STANDARD FLEX - (30/CA)

## (undated) DEVICE — SET LEADWIRE 5 LEAD BEDSIDE DISPOSABLE ECG (1SET OF 5/EA)

## (undated) DEVICE — NEEDLE NON-SAFETY HYPO 21 GA X 1 1/2 IN HYPO (100/BX)

## (undated) DEVICE — MIDAS LUBRICATOR DIFFUSER PACK (4EA/CA)

## (undated) DEVICE — SET EXTENSION WITH 2 PORTS (48EA/CA) ***PART #2C8610 IS A SUBSTITUTE*****

## (undated) DEVICE — BOVIE BLADE COATED - (50/PK)

## (undated) DEVICE — SENSOR OXIMETER ADULT SPO2 RD SET (20EA/BX)

## (undated) DEVICE — GLOVE BIOGEL PI INDICATOR SZ 7.0 SURGICAL PF LF - (50/BX 4BX/CA)

## (undated) DEVICE — CHLORAPREP 26 ML APPLICATOR - ORANGE TINT(25/CA)

## (undated) DEVICE — PACK NEURO - (2EA/CA)

## (undated) DEVICE — DRAPE C ARMOR (12EA/CA)

## (undated) DEVICE — KIT SURGIFLO W/OUT THROMBIN - (6EA/CA)

## (undated) DEVICE — BLADE SURGICAL CLIPPER - (50EA/CA)

## (undated) DEVICE — CLOSURE SKIN STRIP 1/2 X 4 IN - (STERI STRIP) (50/BX 4BX/CA)

## (undated) DEVICE — SEALER BIPOLAR 2.3 AQUAMANTYS